# Patient Record
Sex: MALE | Race: WHITE | NOT HISPANIC OR LATINO | Employment: OTHER | URBAN - METROPOLITAN AREA
[De-identification: names, ages, dates, MRNs, and addresses within clinical notes are randomized per-mention and may not be internally consistent; named-entity substitution may affect disease eponyms.]

---

## 2017-01-13 ENCOUNTER — THERAPY VISIT (OUTPATIENT)
Dept: PHYSICAL THERAPY | Facility: CLINIC | Age: 62
End: 2017-01-13
Payer: COMMERCIAL

## 2017-01-13 DIAGNOSIS — M25.561 CHRONIC PAIN OF RIGHT KNEE: Primary | ICD-10-CM

## 2017-01-13 DIAGNOSIS — G89.29 CHRONIC PAIN OF RIGHT KNEE: Primary | ICD-10-CM

## 2017-01-13 PROCEDURE — 97530 THERAPEUTIC ACTIVITIES: CPT | Mod: GP | Performed by: PHYSICAL THERAPIST

## 2017-01-13 PROCEDURE — 97110 THERAPEUTIC EXERCISES: CPT | Mod: GP | Performed by: PHYSICAL THERAPIST

## 2017-01-13 PROCEDURE — 97112 NEUROMUSCULAR REEDUCATION: CPT | Mod: GP | Performed by: PHYSICAL THERAPIST

## 2017-01-18 DIAGNOSIS — G89.29 CHRONIC MIDLINE LOW BACK PAIN WITHOUT SCIATICA: Primary | ICD-10-CM

## 2017-01-18 DIAGNOSIS — M54.50 CHRONIC MIDLINE LOW BACK PAIN WITHOUT SCIATICA: Primary | ICD-10-CM

## 2017-01-23 ENCOUNTER — THERAPY VISIT (OUTPATIENT)
Dept: PHYSICAL THERAPY | Facility: CLINIC | Age: 62
End: 2017-01-23
Payer: COMMERCIAL

## 2017-01-23 DIAGNOSIS — M54.50 CHRONIC BILATERAL LOW BACK PAIN WITHOUT SCIATICA: Primary | ICD-10-CM

## 2017-01-23 DIAGNOSIS — G89.29 CHRONIC BILATERAL LOW BACK PAIN WITHOUT SCIATICA: Primary | ICD-10-CM

## 2017-01-23 PROCEDURE — 97161 PT EVAL LOW COMPLEX 20 MIN: CPT | Mod: GP | Performed by: PHYSICAL THERAPIST

## 2017-01-23 PROCEDURE — 97110 THERAPEUTIC EXERCISES: CPT | Mod: GP | Performed by: PHYSICAL THERAPIST

## 2017-01-23 NOTE — PROGRESS NOTES
Subjective:    Marvin Elaine is a 61 year old male with a lumbar condition.  Condition occurred with:  Insidious onset.    This is a chronic condition  Pt presents to PT with LBP that started in his 20s with no specific RO. Pt recalls having back pain after shoveling snow, but does not recall a specific injury. He has had chronic, recurrent LBP since, with intermittent flare ups several times per year. Pt states he was diagnosed with mild scoliosis in the late '70s, but has never had any treatment for the LBP.  Most recently, the LBP has been present for the past couple weeks without specific onset. He notes pain in the morning before getting out of bed. He has even felt stuck a few times with more intense, brief pain.     Recently, pt also experienced a strained abdominal muscle that he reports is feeling much better.     PMH: see previous EPIC notes. .    Site of Pain: Central and bilateral low back.  Radiates to:  No radiation.  Pain is described as aching (sore; very rarely has sharp or shooting pain, described as a chronic annoyance) and is intermittent Pain Scale: 1/10 pain sitting at time of eval. 0-4/10 pain range.   Associated symptoms:  Loss of motion/stiffness. Worse during: not dependent upon the time of day, other than being stiff upon waking.  Symptoms are exacerbated by lifting and bending (lying supine, prolonged sitting) and relieved by heat (moving out of a painful position).  Pain course: Very gradually getting worse throughout the years.   Special tests:  X-ray (1970's mild scoliosis).  Previous treatment: no previous treatment.    General health as reported by patient is excellent.                                            Objective:    Standing Alignment:        Lumbar:  Anterior pelvic tilt and lordosis incr                           Lumbar/SI Evaluation  ROM:    AROM Lumbar:   Flexion:          Hands to ankles, B LE and LB stretch  Ext:                    50%   Side Bend:        Left:   75%, produced R sided pain    Right:  75%, produced L sided pain  Rotation:           Left:     Right:   Side Glide:        Left:     Right:         Strength: Demonstrates poor core stability with transitional motions                Lumbar Provocation:      Left negative with:  PROM hip    Right negative with:  PROM hip  Spinal Segmental Conclusions: Generally hypomobile lumbar spine. Hypertrophied paraspinals. Lower crossed syndrome.                                                        General     ROS    Assessment/Plan:      Patient is a 61 year old male with lumbar complaints.    Patient has the following significant findings with corresponding treatment plan.                Diagnosis 1:  Chronic, recurrent LBP  Pain -  hot/cold therapy, manual therapy, self management, education, directional preference exercise and home program  Decreased ROM/flexibility - manual therapy, therapeutic exercise and home program  Decreased joint mobility - manual therapy, therapeutic exercise and home program  Decreased strength - therapeutic exercise, therapeutic activities and home program  Decreased function - therapeutic activities and home program  Impaired posture - neuro re-education and home program    Therapy Evaluation Codes:   1) History comprised of:   Personal factors that impact the plan of care:      Anxiety, Past/current experiences and Time since onset of symptoms.    Comorbidity factors that impact the plan of care are:      Depression.     Medications impacting care: Anti-depressant.  2) Examination of Body Systems comprised of:   Body structures and functions that impact the plan of care:      Lumbar spine, Pelvis and Thoracic Spine.   Activity limitations that impact the plan of care are:      Bending, Lifting, Reading/Computer work, Sleeping and Laying down.  3) Clinical presentation characteristics are:   Stable/Uncomplicated.  4) Decision-Making    Low complexity using standardized patient assessment  instrument and/or measureable assessment of functional outcome.  Cumulative Therapy Evaluation is: Low complexity.    Previous and current functional limitations:  (See Goal Flow Sheet for this information)    Short term and Long term goals: (See Goal Flow Sheet for this information)     Communication ability:  Patient appears to be able to clearly communicate and understand verbal and written communication and follow directions correctly.  Treatment Explanation - The following has been discussed with the patient:   RX ordered/plan of care  Anticipated outcomes  Possible risks and side effects  This patient would benefit from PT intervention to resume normal activities.   Rehab potential is good to excellent.    Frequency:  1 X week, once daily  Duration:  for 3 weeks tapering to 2 X a month over 8 weeks  Discharge Plan:  Achieve all LTG.  Independent in home treatment program.  Reach maximal therapeutic benefit.    Please refer to the daily flowsheet for treatment today, total treatment time and time spent performing 1:1 timed codes.

## 2017-01-27 ENCOUNTER — TELEPHONE (OUTPATIENT)
Dept: PHYSICAL THERAPY | Facility: CLINIC | Age: 62
End: 2017-01-27

## 2017-01-27 DIAGNOSIS — G89.29 CHRONIC BILATERAL LOW BACK PAIN WITHOUT SCIATICA: Primary | ICD-10-CM

## 2017-01-27 DIAGNOSIS — M54.50 CHRONIC BILATERAL LOW BACK PAIN WITHOUT SCIATICA: Primary | ICD-10-CM

## 2017-01-30 ENCOUNTER — MYC MEDICAL ADVICE (OUTPATIENT)
Dept: FAMILY MEDICINE | Facility: CLINIC | Age: 62
End: 2017-01-30

## 2017-01-30 DIAGNOSIS — M54.50 CHRONIC MIDLINE LOW BACK PAIN WITHOUT SCIATICA: Primary | ICD-10-CM

## 2017-01-30 DIAGNOSIS — G89.29 CHRONIC MIDLINE LOW BACK PAIN WITHOUT SCIATICA: Primary | ICD-10-CM

## 2017-01-30 NOTE — TELEPHONE ENCOUNTER
Referral pended.    Routing to PCP.    Dr. Wegener-Please review and sign if agree.    Thank you!  AUGUSTUS SnowN, RN

## 2017-02-02 ENCOUNTER — THERAPY VISIT (OUTPATIENT)
Dept: PHYSICAL THERAPY | Facility: CLINIC | Age: 62
End: 2017-02-02
Payer: COMMERCIAL

## 2017-02-02 DIAGNOSIS — G89.29 CHRONIC BILATERAL LOW BACK PAIN WITHOUT SCIATICA: Primary | ICD-10-CM

## 2017-02-02 DIAGNOSIS — M54.50 CHRONIC BILATERAL LOW BACK PAIN WITHOUT SCIATICA: Primary | ICD-10-CM

## 2017-02-02 PROCEDURE — 97112 NEUROMUSCULAR REEDUCATION: CPT | Mod: GP | Performed by: PHYSICAL THERAPIST

## 2017-02-02 PROCEDURE — 97530 THERAPEUTIC ACTIVITIES: CPT | Mod: GP | Performed by: PHYSICAL THERAPIST

## 2017-02-02 PROCEDURE — 97110 THERAPEUTIC EXERCISES: CPT | Mod: GP | Performed by: PHYSICAL THERAPIST

## 2017-02-02 ASSESSMENT — ACTIVITIES OF DAILY LIVING (ADL)
RISE FROM A CHAIR: ACTIVITY IS NOT DIFFICULT
STAND: ACTIVITY IS NOT DIFFICULT
KNEEL ON THE FRONT OF YOUR KNEE: ACTIVITY IS NOT DIFFICULT
LIMPING: I DO NOT HAVE THE SYMPTOM
GIVING WAY, BUCKLING OR SHIFTING OF KNEE: I DO NOT HAVE THE SYMPTOM
HOW_WOULD_YOU_RATE_THE_CURRENT_FUNCTION_OF_YOUR_KNEE_DURING_YOUR_USUAL_DAILY_ACTIVITIES_ON_A_SCALE_FROM_0_TO_100_WITH_100_BEING_YOUR_LEVEL_OF_KNEE_FUNCTION_PRIOR_TO_YOUR_INJURY_AND_0_BEING_THE_INABILITY_TO_PERFORM_ANY_OF_YOUR_USUAL_DAILY_ACTIVITIES?: 85
RAW_SCORE: 67
SWELLING: I DO NOT HAVE THE SYMPTOM
KNEE_ACTIVITY_OF_DAILY_LIVING_SUM: 67
WALK: ACTIVITY IS NOT DIFFICULT
SIT WITH YOUR KNEE BENT: ACTIVITY IS NOT DIFFICULT
PAIN: I HAVE THE SYMPTOM BUT IT DOES NOT AFFECT MY ACTIVITY
STIFFNESS: I DO NOT HAVE THE SYMPTOM
SQUAT: ACTIVITY IS MINIMALLY DIFFICULT
WEAKNESS: I HAVE THE SYMPTOM BUT IT DOES NOT AFFECT MY ACTIVITY
GO DOWN STAIRS: ACTIVITY IS NOT DIFFICULT
KNEE_ACTIVITY_OF_DAILY_LIVING_SCORE: 95.71
HOW_WOULD_YOU_RATE_THE_OVERALL_FUNCTION_OF_YOUR_KNEE_DURING_YOUR_USUAL_DAILY_ACTIVITIES?: NEARLY NORMAL
GO UP STAIRS: ACTIVITY IS NOT DIFFICULT
AS_A_RESULT_OF_YOUR_KNEE_INJURY,_HOW_WOULD_YOU_RATE_YOUR_CURRENT_LEVEL_OF_DAILY_ACTIVITY?: NEARLY NORMAL

## 2017-03-01 ENCOUNTER — OFFICE VISIT (OUTPATIENT)
Dept: DERMATOLOGY | Facility: CLINIC | Age: 62
End: 2017-03-01

## 2017-03-01 DIAGNOSIS — L82.0 INFLAMED SEBORRHEIC KERATOSIS: Primary | ICD-10-CM

## 2017-03-01 RX ORDER — VILAZODONE HYDROCHLORIDE 40 MG/1
60 TABLET ORAL
COMMUNITY
Start: 2017-01-02 | End: 2021-10-21

## 2017-03-01 NOTE — PROGRESS NOTES
SUBJECTIVE:  Nick comes in for a general skin check.  He is quite concerned about the health of his skin, but has always been averse to getting sun exposure.  A brother, who is older by a year, has had a lot of sun damage and lives in [place] and has had many procedures apparently.       OBJECTIVE:  A healthy gentleman in no distress, a very pleasant individual.  We checked his face, neck, chest, back, arms, legs, hands, feet, buttocks, everything but his genitals.  I found only some scattered seborrheic keratoses and nevi, but no actinic keratoses or other evidence of skin cancer.      ASSESSMENT:  No worrisome lesions.      PLAN:  Reassured repeatedly that he is doing well, mainly because he avoids the sun and is health conscious.        I suggested that he return in 1 year, probably to allay his anxieties more than anything, though overall a nice  gentleman without any serious skin disease or skin lesions.      MEDICATIONS:  Reviewed.      ALLERGIES:  Reviewed.

## 2017-03-01 NOTE — NURSING NOTE
"Dermatology Rooming Note    Marvin Elaine's goals for this visit include:   Chief Complaint   Patient presents with     Skin Check     Skin check, Marvin states \" I have an AK on my foot.\"     Rasheeda Koch LPN  "

## 2017-03-01 NOTE — LETTER
3/1/2017       RE: Marvin Elaine  [address]  [facility] [address]     Dear Colleague,    Thank you for referring your patient, Marvin Elaine, to the [facility] DERMAT[facility]Y at [facility]. Please see a copy of my visit note below.    SUBJECTIVE:  Nick comes in for a general skin check.  He is quite concerned about the health of his skin, but has always been averse to getting sun exposure.  A brother, who is older by a year, has had a lot of sun damage and lives in [place] and has had many procedures apparently.       OBJECTIVE:  A healthy gentleman in no distress, a very pleasant individual.  We checked his face, neck, chest, back, arms, legs, hands, feet, buttocks, everything but his genitals.  I found only some scattered seborrheic keratoses and nevi, but no actinic keratoses or other evidence of skin cancer.      ASSESSMENT:  No worrisome lesions.      PLAN:  Reassured repeatedly that he is doing well, mainly because he avoids the sun and is health conscious.        I suggested that he return in 1 year, probably to allay his anxieties more than anything, though overall a nice  gentleman without any serious skin disease or skin lesions.      MEDICATIONS:  Reviewed.      ALLERGIES:  Reviewed.       Again, thank you for allowing me to participate in the care of your patient.      Sincerely,    ABHAY Soto MD

## 2017-03-21 ENCOUNTER — TELEPHONE (OUTPATIENT)
Dept: FAMILY MEDICINE | Facility: CLINIC | Age: 62
End: 2017-03-21

## 2017-03-21 DIAGNOSIS — Z12.11 COLON CANCER SCREENING: ICD-10-CM

## 2017-03-21 DIAGNOSIS — Z11.59 NEED FOR HEPATITIS C SCREENING TEST: Primary | ICD-10-CM

## 2017-03-21 NOTE — TELEPHONE ENCOUNTER
Left message for patient to inform that lab orders are in chart and he can call and schedule a lab only appointment.  Cathie Webb RN

## 2017-03-21 NOTE — TELEPHONE ENCOUNTER
Reason for Call:  Other call back/ appointment    Detailed comments: Pt would like to make a lab only appointment to  fit test and hep c screening. Labs are not ordered for hep c screening. Please let pt know once these are ordered so he can set up an appointment, thank you!    Phone Number Patient can be reached at: Home number on file [phone] (home)    Best Time: anytime    Can we leave a detailed message on this number? YES    Call taken on 3/21/2017 at 9:18 AM by Jodi Solis

## 2017-03-21 NOTE — TELEPHONE ENCOUNTER
Labs pended.    Routing to PCP.    Dr. Wegener-Please review and sign if agree.    Thank you!  AUGUSTUS SnowN, RN

## 2017-04-05 DIAGNOSIS — Z12.11 COLON CANCER SCREENING: ICD-10-CM

## 2017-04-05 DIAGNOSIS — Z11.59 NEED FOR HEPATITIS C SCREENING TEST: ICD-10-CM

## 2017-04-05 PROCEDURE — 86803 HEPATITIS C AB TEST: CPT | Performed by: FAMILY MEDICINE

## 2017-04-05 PROCEDURE — 36415 COLL VENOUS BLD VENIPUNCTURE: CPT | Performed by: FAMILY MEDICINE

## 2017-04-06 LAB — HCV AB SERPL QL IA: NORMAL

## 2017-04-14 PROCEDURE — 82274 ASSAY TEST FOR BLOOD FECAL: CPT | Performed by: FAMILY MEDICINE

## 2017-04-17 DIAGNOSIS — R19.5 POSITIVE FIT (FECAL IMMUNOCHEMICAL TEST): Primary | ICD-10-CM

## 2017-04-17 DIAGNOSIS — Z12.11 COLON CANCER SCREENING: ICD-10-CM

## 2017-04-17 LAB — HEMOCCULT STL QL IA: POSITIVE

## 2017-04-18 ENCOUNTER — TELEPHONE (OUTPATIENT)
Dept: FAMILY MEDICINE | Facility: CLINIC | Age: 62
End: 2017-04-18

## 2017-04-18 DIAGNOSIS — R19.5 POSITIVE FIT (FECAL IMMUNOCHEMICAL TEST): Primary | ICD-10-CM

## 2017-04-18 NOTE — TELEPHONE ENCOUNTER
Dr Wegener,    JONNA only    Pt needs to call [phone] to schedule colonoscopy. He was contacted and given this information.    Pt confesses he has anxiety and depression and the procedure and the possible results are very difficult for him  He is seeing his therapist in just a couple hours so will discuss with him and find a way to cope with this. He does realize it is important to do and he took down the scheduling number.    He does take alprazolam as needed for anxiety    I have asked pt to please call us back if any questions or if there is anyway we can help him .    Lili Desouza, RN, BSN

## 2017-04-18 NOTE — TELEPHONE ENCOUNTER
FIT positive.  Should avoid future FIT test.  Should have colonoscopy to rule out colon cancer.   Signed colonoscopy referral.

## 2017-04-20 ENCOUNTER — MYC MEDICAL ADVICE (OUTPATIENT)
Dept: FAMILY MEDICINE | Facility: CLINIC | Age: 62
End: 2017-04-20

## 2017-04-20 ENCOUNTER — TELEPHONE (OUTPATIENT)
Dept: FAMILY MEDICINE | Facility: CLINIC | Age: 62
End: 2017-04-20

## 2017-04-20 NOTE — TELEPHONE ENCOUNTER
Call pt regarding below.  I don't know why the colonoscopy referral no longer has a scheduling number.     Joel Wegener,MD    The stool screening test was positive so I would definitely recommend doing a colonoscopy.  I will have my team contact you as well with scheduling information.   Joel Wegener,MD

## 2017-04-20 NOTE — TELEPHONE ENCOUNTER
See 4/18/17 telephone encounter and 4/19/17 MyChart encounter.    Patient was given test results, colonoscopy appointment and prep information.    Closing this encounter.    AUGUSTUS SnowN, RN

## 2017-05-02 DIAGNOSIS — Z12.11 ENCOUNTER FOR SCREENING COLONOSCOPY: Primary | ICD-10-CM

## 2017-05-02 RX ORDER — PEG-3350, SODIUM SULFATE, SODIUM CHLORIDE, POTASSIUM CHLORIDE, SODIUM ASCORBATE AND ASCORBIC ACID 7.5-2.691G
1 KIT ORAL SEE ADMIN INSTRUCTIONS
Qty: 1 EACH | Refills: 0 | Status: SHIPPED | OUTPATIENT
Start: 2017-05-02 | End: 2018-12-20

## 2017-05-02 RX ORDER — SIMETHICONE 125 MG
125 CAPSULE ORAL SEE ADMIN INSTRUCTIONS
Qty: 1 CAPSULE | Refills: 0 | Status: SHIPPED | OUTPATIENT
Start: 2017-05-02 | End: 2018-12-20

## 2017-05-08 DIAGNOSIS — R11.0 NAUSEA: Primary | ICD-10-CM

## 2017-05-08 RX ORDER — ONDANSETRON 4 MG/1
4-8 TABLET, ORALLY DISINTEGRATING ORAL EVERY 8 HOURS PRN
Qty: 10 TABLET | Refills: 0 | Status: SHIPPED | OUTPATIENT
Start: 2017-05-08 | End: 2021-10-21

## 2017-05-08 NOTE — TELEPHONE ENCOUNTER
Writer called patient who stated:  1. Not currently nauseous  2. Had difficult time completing prep for last colonoscopy 10-11 years ago  3. Picked up prep from pharmacy   A. Medication insert does mention bloating/nausea as side effect  4. Would like anti-nausea medication prescribed so he can be prepared incase he needs it    Med pended.    Dr. Wegener-Please review and sign if agree.      Thank you!  AUGUSTUS SnowN, RN

## 2017-05-08 NOTE — TELEPHONE ENCOUNTER
Reason for Call:  Medication or medication refill: Anti-Nausea Medication    Do you use a [facility]?  Name of the pharmacy and phone number for the current request:  [facility]    Name of the medication requested: Anti - Nausea medication    Other request: Pt is having his colonoscopy procedure Wednesday morning. Pt is requesting Anti-Nausea medication ASAP before his procedure.     Can we leave a detailed message on this number? YES    Phone number patient can be reached at: Home number on file [phone] (home)    Best Time: anytime    Call taken on 5/8/2017 at 2:30 PM by Kellie Solis

## 2017-05-10 ENCOUNTER — TRANSFERRED RECORDS (OUTPATIENT)
Dept: HEALTH INFORMATION MANAGEMENT | Facility: CLINIC | Age: 62
End: 2017-05-10

## 2017-09-08 ENCOUNTER — OFFICE VISIT (OUTPATIENT)
Dept: URGENT CARE | Facility: URGENT CARE | Age: 62
End: 2017-09-08
Payer: COMMERCIAL

## 2017-09-08 VITALS
OXYGEN SATURATION: 100 % | HEART RATE: 81 BPM | SYSTOLIC BLOOD PRESSURE: 130 MMHG | DIASTOLIC BLOOD PRESSURE: 76 MMHG | HEIGHT: 68 IN | WEIGHT: 128 LBS | BODY MASS INDEX: 19.4 KG/M2 | TEMPERATURE: 98.1 F

## 2017-09-08 DIAGNOSIS — S61.452A CAT BITE OF LEFT HAND, INITIAL ENCOUNTER: Primary | ICD-10-CM
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
## 2017-09-08 PROCEDURE — 99213 OFFICE O/P EST LOW 20 MIN: CPT | Performed by: FAMILY MEDICINE

## 2017-09-08 NOTE — PROGRESS NOTES
Subjective: Patient was taking care of a cat for a friend, is confident that the shots on the cat are up-to-date but the cat bit him in the left hand and that was this morning and it has swollen up since then. It's at the base of his index finger and its getting stiffer.    Objective: The knuckle is mildly swollen and mildly red. There is no active bleeding. It's not warm.    Assessment and plan: Infected cat bite. He will start Augmentin tonight and warned that if things don't turn around quickly he should go to the emergency room. It doesn't look really bad right now and so I don't think injectable antibiotics are needed at this time.

## 2017-09-08 NOTE — MR AVS SNAPSHOT
"              After Visit Summary   9/8/2017    Marvin Elaine    MRN: [id number]           Patient Information     Date Of Birth          1955        Visit Information        Provider Department      9/8/2017 6:00 PM Neftali Palacios MD [facility]        Today's Diagnoses     Cat bite of left hand, initial encounter    -  1       Follow-ups after your visit        Who to contact     If you have questions or need follow up information about today's clinic visit or your schedule please contact [facility] directly at [phone].  Normal or non-critical lab and imaging results will be communicated to you by [email or web address], letter or phone within 4 business days after the clinic has received the results. If you do not hear from us within 7 days, please contact the clinic through Control4t or phone. If you have a critical or abnormal lab result, we will notify you by phone as soon as possible.  Submit refill requests through Bloomfire or call your pharmacy and they will forward the refill request to us. Please allow 3 business days for your refill to be completed.          Additional Information About Your Visit        MyChart Information     Bloomfire gives you secure access to your electronic health record. If you see a primary care provider, you can also send messages to your care team and make appointments. If you have questions, please call your primary care clinic.  If you do not have a primary care provider, please call [phone] and they will assist you.        Care EveryWhere ID     This is your Care EveryWhere ID. This could be used by other organizations to access your [place] medical records  [id number]        Your Vitals Were     Pulse Temperature Height Pulse Oximetry BMI (Body Mass Index)       81 98.1  F (36.7  C) (Oral) 5' 8\" (1.727 m) 100% 19.46 kg/m2        Blood Pressure from Last 3 Encounters:   09/08/17 130/76   11/22/16 104/66   07/08/16 125/78 "    Weight from Last 3 Encounters:   09/08/17 128 lb (58.1 kg)   11/22/16 122 lb (55.3 kg)   07/08/16 122 lb (55.3 kg)              Today, you had the following     No orders found for display         Today's Medication Changes          These changes are accurate as of: 9/8/17  6:48 PM.  If you have any questions, ask your nurse or doctor.               Start taking these medicines.        Dose/Directions    amoxicillin-clavulanate 875-125 MG per tablet   Commonly known as:  AUGMENTIN   Used for:  Cat bite of left hand, initial encounter   Started by:  Neftali Palacios MD        Dose:  1 tablet   Take 1 tablet by mouth 2 times daily   Quantity:  20 tablet   Refills:  0            Where to get your medicines      These medications were sent to [id number] - [place], [place] - [address] AT [facility] [address]  [address], [place] [address]     Phone:  [phone]     amoxicillin-clavulanate 875-125 MG per tablet                Primary Care Provider Office Phone # Fax #    Joel Daniel Irwin Wegener, [phone]       [address]  [facility] [address]        Equal Access to Services     SON SOTO AH: Hadii melba samuels hadasho Soomaali, waaxda luqadaha, qaybta kaalmada adeegyada, wilma vivas. So [facility] [phone].    ATENCIÓN: Si habla español, tiene a pace disposición servicios gratuitos de asistencia lingüística. [facility] [phone].    We comply with applicable federal civil rights laws and [place] laws. We do not discriminate on the basis of race, color, national origin, age, disability sex, sexual orientation or gender identity.            Thank you!     Thank you for choosing [facility]  for your care. Our goal is always to provide you with excellent care. Hearing back from our patients is one way we can continue to improve our services. Please take a few minutes to complete the written survey that you may receive  "in the mail after your visit with us. Thank you!             Your Updated Medication List - Protect others around you: Learn how to safely use, store and throw away your medicines at www.disposemymeds.org.          This list is accurate as of: 9/8/17  6:48 PM.  Always use your most recent med list.                   Brand Name Dispense Instructions for use Diagnosis    ALPRAZOLAM PO      Take 0.25 mg by mouth        amoxicillin-clavulanate 875-125 MG per tablet    AUGMENTIN    20 tablet    Take 1 tablet by mouth 2 times daily    Cat bite of left hand, initial encounter       ASPIRIN PO      Take 81 mg by mouth daily        latanoprost 0.005 % ophthalmic solution    XALATAN     Place 1 drop Into the left eye daily        magnesium citrate solution     296 mL    Take 296 mLs by mouth See Admin Instructions Refer to the \"Getting Ready for a Colonoscopy\" patient handout    Encounter for screening colonoscopy       Multi-vitamin Tabs tablet      Take 1 tablet by mouth daily        ondansetron 4 MG ODT tab    ZOFRAN ODT    10 tablet    Take 1-2 tablets (4-8 mg) by mouth every 8 hours as needed for nausea    Nausea       PEG-KCl-NaCl-NaSulf-Na Asc-C 100 G Solr    MOVIPREP    1 each    Take 1 each by mouth See Admin Instructions Refer to \"Getting Ready for a Colonoscopy\" patient handout    Encounter for screening colonoscopy       Simethicone 125 MG Caps     1 capsule    Take 125 mg by mouth See Admin Instructions Refer to \"Getting Ready for a Colonoscopy\" patient handout    Encounter for screening colonoscopy       VIIBRYD 40 MG Tabs tablet   Generic drug:  vilazodone      40 mg        VITAMIN D (CHOLECALCIFEROL) PO      Take 1,000 Units by mouth daily Vitamin D        WELLBUTRIN 100 MG tablet   Generic drug:  buPROPion     90 tablet    Take 100 mg by mouth 2 times daily          "

## 2017-10-02 ENCOUNTER — ALLIED HEALTH/NURSE VISIT (OUTPATIENT)
Dept: NURSING | Facility: CLINIC | Age: 62
End: 2017-10-02
Payer: COMMERCIAL

## 2017-10-02 DIAGNOSIS — Z23 NEED FOR PROPHYLACTIC VACCINATION AND INOCULATION AGAINST INFLUENZA: Primary | ICD-10-CM

## 2017-10-02 PROCEDURE — 90471 IMMUNIZATION ADMIN: CPT

## 2017-10-02 PROCEDURE — 99207 ZZC NO CHARGE NURSE ONLY: CPT

## 2017-10-02 PROCEDURE — 90686 IIV4 VACC NO PRSV 0.5 ML IM: CPT

## 2017-10-02 NOTE — MR AVS SNAPSHOT
After Visit Summary   10/2/2017    Marvin Elaine    MRN: [id number]           Patient Information     Date Of Birth          1955        Visit Information        Provider Department      10/2/2017 9:15 AM  FLU CLINIC NURSE [facility]        Today's Diagnoses     Need for prophylactic vaccination and inoculation against influenza    -  1       Follow-ups after your visit        Who to contact     If you have questions or need follow up information about today's clinic visit or your schedule please contact [facility] directly at [phone].  Normal or non-critical lab and imaging results will be communicated to you by StarForce Technologieshart, letter or phone within 4 business days after the clinic has received the results. If you do not hear from us within 7 days, please contact the clinic through YelloYello or phone. If you have a critical or abnormal lab result, we will notify you by phone as soon as possible.  Submit refill requests through YelloYello or call your pharmacy and they will forward the refill request to us. Please allow 3 business days for your refill to be completed.          Additional Information About Your Visit        MyChart Information     YelloYello gives you secure access to your electronic health record. If you see a primary care provider, you can also send messages to your care team and make appointments. If you have questions, please call your primary care clinic.  If you do not have a primary care provider, please call [phone] and they will assist you.        Care EveryWhere ID     This is your Care EveryWhere ID. This could be used by other organizations to access your [place] medical records  [id number]         Blood Pressure from Last 3 Encounters:   09/08/17 130/76   11/22/16 104/66   07/08/16 125/78    Weight from Last 3 Encounters:   09/08/17 128 lb (58.1 kg)   11/22/16 122 lb (55.3 kg)   07/08/16 122 lb (55.3 kg)              We Performed the  "Following     FLU VAC, SPLIT VIRUS IM > 3 YO (QUADRIVALENT) [88900]     Vaccine Administration, Initial [64443]        Primary Care Provider Office Phone # Fax #    Joel Daniel Irwin Wegener, [phone] [address]  [facility] [address]        Equal Access to Services     MOLLYAICHA BRITTANY : Hadii aad ku hadasho Soomaali, waaxda luqadaha, qaybta kaalmada adeegyada, waxay idiin hayaan adeeg kharash la'aan . So [facility] [phone].    ATENCIÓN: Si habla español, tiene a pace disposición servicios gratuitos de asistencia lingüística. Llame al [phone].    We comply with applicable federal civil rights laws and [place] laws. We do not discriminate on the basis of race, color, national origin, age, disability, sex, sexual orientation, or gender identity.            Thank you!     Thank you for choosing [facility]  for your care. Our goal is always to provide you with excellent care. Hearing back from our patients is one way we can continue to improve our services. Please take a few minutes to complete the written survey that you may receive in the mail after your visit with us. Thank you!             Your Updated Medication List - Protect others around you: Learn how to safely use, store and throw away your medicines at www.disposemymeds.org.          This list is accurate as of: 10/2/17  9:26 AM.  Always use your most recent med list.                   Brand Name Dispense Instructions for use Diagnosis    ALPRAZOLAM PO      Take 0.25 mg by mouth        amoxicillin-clavulanate 875-125 MG per tablet    AUGMENTIN    20 tablet    Take 1 tablet by mouth 2 times daily    Cat bite of left hand, initial encounter       ASPIRIN PO      Take 81 mg by mouth daily        latanoprost 0.005 % ophthalmic solution    XALATAN     Place 1 drop Into the left eye daily        magnesium citrate solution     296 mL    Take 296 mLs by mouth See Admin Instructions Refer to the \"Getting Ready for a " "Colonoscopy\" patient handout    Encounter for screening colonoscopy       Multi-vitamin Tabs tablet      Take 1 tablet by mouth daily        ondansetron 4 MG ODT tab    ZOFRAN ODT    10 tablet    Take 1-2 tablets (4-8 mg) by mouth every 8 hours as needed for nausea    Nausea       PEG-KCl-NaCl-NaSulf-Na Asc-C 100 G Solr    MOVIPREP    1 each    Take 1 each by mouth See Admin Instructions Refer to \"Getting Ready for a Colonoscopy\" patient handout    Encounter for screening colonoscopy       Simethicone 125 MG Caps     1 capsule    Take 125 mg by mouth See Admin Instructions Refer to \"Getting Ready for a Colonoscopy\" patient handout    Encounter for screening colonoscopy       VIIBRYD 40 MG Tabs tablet   Generic drug:  vilazodone      40 mg        VITAMIN D (CHOLECALCIFEROL) PO      Take 1,000 Units by mouth daily Vitamin D        WELLBUTRIN 100 MG tablet   Generic drug:  buPROPion     90 tablet    Take 100 mg by mouth 2 times daily          "

## 2017-10-02 NOTE — PROGRESS NOTES
Injectable Influenza Immunization Documentation    1.  Is the person to be vaccinated sick today?   No    2. Does the person to be vaccinated have an allergy to a component   of the vaccine?   No    3. Has the person to be vaccinated ever had a serious reaction   to influenza vaccine in the past?   No    4. Has the person to be vaccinated ever had Guillain-Barré syndrome?   No    Form completed by [facility]

## 2017-10-13 ENCOUNTER — APPOINTMENT (OUTPATIENT)
Dept: CT IMAGING | Facility: CLINIC | Age: 62
End: 2017-10-13
Attending: EMERGENCY MEDICINE
Payer: COMMERCIAL

## 2017-10-13 ENCOUNTER — OFFICE VISIT (OUTPATIENT)
Dept: FAMILY MEDICINE | Facility: CLINIC | Age: 62
End: 2017-10-13
Payer: COMMERCIAL

## 2017-10-13 ENCOUNTER — HOSPITAL ENCOUNTER (EMERGENCY)
Facility: CLINIC | Age: 62
Discharge: HOME | End: 2017-10-13
Attending: EMERGENCY MEDICINE | Admitting: EMERGENCY MEDICINE
Payer: COMMERCIAL

## 2017-10-13 VITALS
SYSTOLIC BLOOD PRESSURE: 155 MMHG | TEMPERATURE: 97.3 F | HEART RATE: 71 BPM | OXYGEN SATURATION: 97 % | DIASTOLIC BLOOD PRESSURE: 87 MMHG

## 2017-10-13 VITALS
OXYGEN SATURATION: 100 % | DIASTOLIC BLOOD PRESSURE: 87 MMHG | TEMPERATURE: 98.1 F | SYSTOLIC BLOOD PRESSURE: 135 MMHG | RESPIRATION RATE: 16 BRPM

## 2017-10-13 DIAGNOSIS — N13.2 HYDRONEPHROSIS WITH RENAL AND URETERAL CALCULUS OBSTRUCTION: ICD-10-CM

## 2017-10-13 DIAGNOSIS — E86.0 DEHYDRATION: ICD-10-CM

## 2017-10-13 DIAGNOSIS — D64.9 ABSOLUTE ANEMIA: ICD-10-CM

## 2017-10-13 DIAGNOSIS — M54.50 ACUTE RIGHT-SIDED LOW BACK PAIN WITHOUT SCIATICA: Primary | ICD-10-CM

## 2017-10-13 DIAGNOSIS — N20.0 CALCULUS OF KIDNEY: ICD-10-CM

## 2017-10-13 DIAGNOSIS — R10.31 ABDOMINAL PAIN, RIGHT LOWER QUADRANT: ICD-10-CM

## 2017-10-13 LAB
ALBUMIN SERPL-MCNC: 3.5 G/DL (ref 3.4–5)
ALBUMIN UR-MCNC: 10 MG/DL
ALP SERPL-CCNC: 96 U/L (ref 40–150)
ALT SERPL W P-5'-P-CCNC: 19 U/L (ref 0–70)
ANION GAP SERPL CALCULATED.3IONS-SCNC: 7 MMOL/L (ref 3–14)
APPEARANCE UR: ABNORMAL
AST SERPL W P-5'-P-CCNC: 20 U/L (ref 0–45)
BASOPHILS # BLD AUTO: 0 10E9/L (ref 0–0.2)
BASOPHILS NFR BLD AUTO: 0.5 %
BILIRUB SERPL-MCNC: 0.4 MG/DL (ref 0.2–1.3)
BILIRUB UR QL STRIP: NEGATIVE
BUN SERPL-MCNC: 20 MG/DL (ref 7–30)
CALCIUM SERPL-MCNC: 8.5 MG/DL (ref 8.5–10.1)
CHLORIDE SERPL-SCNC: 104 MMOL/L (ref 94–109)
CO2 SERPL-SCNC: 30 MMOL/L (ref 20–32)
COLOR UR AUTO: YELLOW
CREAT SERPL-MCNC: 1.35 MG/DL (ref 0.66–1.25)
DIFFERENTIAL METHOD BLD: ABNORMAL
EOSINOPHIL # BLD AUTO: 0.1 10E9/L (ref 0–0.7)
EOSINOPHIL NFR BLD AUTO: 1.7 %
ERYTHROCYTE [DISTWIDTH] IN BLOOD BY AUTOMATED COUNT: 14.9 % (ref 10–15)
GFR SERPL CREATININE-BSD FRML MDRD: 53 ML/MIN/1.7M2
GLUCOSE SERPL-MCNC: 103 MG/DL (ref 70–99)
GLUCOSE UR STRIP-MCNC: NEGATIVE MG/DL
HCT VFR BLD AUTO: 39.7 % (ref 40–53)
HGB BLD-MCNC: 12.2 G/DL (ref 13.3–17.7)
HGB UR QL STRIP: ABNORMAL
HYALINE CASTS #/AREA URNS LPF: 3 /LPF (ref 0–2)
IMM GRANULOCYTES # BLD: 0 10E9/L (ref 0–0.4)
IMM GRANULOCYTES NFR BLD: 0.1 %
KETONES UR STRIP-MCNC: NEGATIVE MG/DL
LEUKOCYTE ESTERASE UR QL STRIP: NEGATIVE
LIPASE SERPL-CCNC: 127 U/L (ref 73–393)
LYMPHOCYTES # BLD AUTO: 1 10E9/L (ref 0.8–5.3)
LYMPHOCYTES NFR BLD AUTO: 11.6 %
MCH RBC QN AUTO: 26.8 PG (ref 26.5–33)
MCHC RBC AUTO-ENTMCNC: 30.7 G/DL (ref 31.5–36.5)
MCV RBC AUTO: 87 FL (ref 78–100)
MONOCYTES # BLD AUTO: 0.4 10E9/L (ref 0–1.3)
MONOCYTES NFR BLD AUTO: 5 %
MUCOUS THREADS #/AREA URNS LPF: PRESENT /LPF
NEUTROPHILS # BLD AUTO: 6.8 10E9/L (ref 1.6–8.3)
NEUTROPHILS NFR BLD AUTO: 81.1 %
NITRATE UR QL: NEGATIVE
NRBC # BLD AUTO: 0 10*3/UL
NRBC BLD AUTO-RTO: 0 /100
PH UR STRIP: 7.5 PH (ref 5–7)
PLATELET # BLD AUTO: 227 10E9/L (ref 150–450)
POTASSIUM SERPL-SCNC: 4.1 MMOL/L (ref 3.4–5.3)
PROT SERPL-MCNC: 6.4 G/DL (ref 6.8–8.8)
RADIOLOGIST FLAGS: ABNORMAL
RBC # BLD AUTO: 4.56 10E12/L (ref 4.4–5.9)
RBC #/AREA URNS AUTO: 113 /HPF (ref 0–2)
SODIUM SERPL-SCNC: 140 MMOL/L (ref 133–144)
SOURCE: ABNORMAL
SP GR UR STRIP: 1.01 (ref 1–1.03)
TRANS CELLS #/AREA URNS HPF: <1 /HPF (ref 0–1)
UROBILINOGEN UR STRIP-MCNC: NORMAL MG/DL (ref 0–2)
WBC # BLD AUTO: 8.4 10E9/L (ref 4–11)
WBC #/AREA URNS AUTO: 0 /HPF (ref 0–2)

## 2017-10-13 PROCEDURE — 80053 COMPREHEN METABOLIC PANEL: CPT | Performed by: EMERGENCY MEDICINE

## 2017-10-13 PROCEDURE — 96374 THER/PROPH/DIAG INJ IV PUSH: CPT

## 2017-10-13 PROCEDURE — 99215 OFFICE O/P EST HI 40 MIN: CPT | Performed by: NURSE PRACTITIONER

## 2017-10-13 PROCEDURE — 99284 EMERGENCY DEPT VISIT MOD MDM: CPT | Mod: Z6 | Performed by: EMERGENCY MEDICINE

## 2017-10-13 PROCEDURE — 25000132 ZZH RX MED GY IP 250 OP 250 PS 637: Performed by: EMERGENCY MEDICINE

## 2017-10-13 PROCEDURE — 25000128 H RX IP 250 OP 636: Performed by: EMERGENCY MEDICINE

## 2017-10-13 PROCEDURE — 81001 URINALYSIS AUTO W/SCOPE: CPT | Performed by: EMERGENCY MEDICINE

## 2017-10-13 PROCEDURE — 85025 COMPLETE CBC W/AUTO DIFF WBC: CPT | Performed by: EMERGENCY MEDICINE

## 2017-10-13 PROCEDURE — 99284 EMERGENCY DEPT VISIT MOD MDM: CPT | Mod: 25

## 2017-10-13 PROCEDURE — 74176 CT ABD & PELVIS W/O CONTRAST: CPT

## 2017-10-13 PROCEDURE — 83690 ASSAY OF LIPASE: CPT | Performed by: EMERGENCY MEDICINE

## 2017-10-13 RX ORDER — TAMSULOSIN HYDROCHLORIDE 0.4 MG/1
0.4 CAPSULE ORAL DAILY
Status: DISCONTINUED | OUTPATIENT
Start: 2017-10-13 | End: 2017-10-13 | Stop reason: HOSPADM

## 2017-10-13 RX ORDER — TAMSULOSIN HYDROCHLORIDE 0.4 MG/1
0.4 CAPSULE ORAL DAILY
Qty: 5 CAPSULE | Refills: 0 | Status: SHIPPED | OUTPATIENT
Start: 2017-10-13 | End: 2017-10-23

## 2017-10-13 RX ORDER — IBUPROFEN 200 MG
400 TABLET ORAL EVERY 8 HOURS PRN
Qty: 60 TABLET | Refills: 0 | Status: SHIPPED | OUTPATIENT
Start: 2017-10-13 | End: 2022-12-30

## 2017-10-13 RX ORDER — OXYCODONE AND ACETAMINOPHEN 5; 325 MG/1; MG/1
1-2 TABLET ORAL EVERY 4 HOURS PRN
Qty: 15 TABLET | Refills: 0 | Status: SHIPPED | OUTPATIENT
Start: 2017-10-13 | End: 2018-12-20

## 2017-10-13 RX ORDER — KETOROLAC TROMETHAMINE 30 MG/ML
30 INJECTION, SOLUTION INTRAMUSCULAR; INTRAVENOUS ONCE
Status: COMPLETED | OUTPATIENT
Start: 2017-10-13 | End: 2017-10-13

## 2017-10-13 RX ADMIN — TAMSULOSIN HYDROCHLORIDE 0.4 MG: 0.4 CAPSULE ORAL at 14:28

## 2017-10-13 RX ADMIN — KETOROLAC TROMETHAMINE 30 MG: 30 INJECTION, SOLUTION INTRAMUSCULAR at 13:13

## 2017-10-13 ASSESSMENT — ENCOUNTER SYMPTOMS
FLANK PAIN: 1
HEMATURIA: 0
FEVER: 0
NAUSEA: 0
ABDOMINAL PAIN: 0
SHORTNESS OF BREATH: 0
VOMITING: 0

## 2017-10-13 NOTE — ED NOTES
Bed: ED06  Expected date: 10/13/17  Expected time: 12:21 PM  Means of arrival: Ambulance  Comments:  [id number]  62 male  abd pain  Yellow

## 2017-10-13 NOTE — ED NOTES
Pt arrives from clinic afte pt started having sudden onset, severe right sided flank pain radiating into his abdomin starting at around 0700 this morning. EMS gsve pt 10 mg of morphine with minimal relief. No complaints of urinary symptoms.

## 2017-10-13 NOTE — DISCHARGE INSTRUCTIONS
"Please make an appointment to follow up with Urology Clinic (phone: [phone]) in 3-5 days for further evaluation and recommendations.     * KIDNEY STONE (w/ Colic)    The sharp cramping pain and nausea/vomiting that you have is due to a small stone which has formed in the kidney and is now passing down a narrow tube (ureter) on its way to your bladder. Once it reaches your bladder, the pain will stop. The stone may pass in your urine stream in one piece. [The size may be 1/16\" to 1/4\" (1-6mm)]. Or, the stone may also break up into ita fragments which you may not even notice.  Once you have had a kidney stone there is a risk for recurrence in the future.  HOME CARE:    Drink lots of fluid (at least 8-10 glasses of water a day).    Most stones will pass on their own, but may take from a few hours to a few days.    Each time you urinate, do so in a jar. Pour the urine from the jar through the strainer and into the toilet. Continue doing this until 24 hours after your pain stops. By then, if there was a kidney stone, it should pass from your bladder. Some stones dissolve into sand-like particles and pass right through the strainer. In that case, you won't ever see a stone.    Save any stone that you find in the strainer and bring it to your doctor for analysis. It may be possible to prevent certain types of stones from forming. Therefore, it is important to know what kind of stone you have.    Try to stay as active as possible since this will help the stone pass. Do not stay in bed unless your pain prevents you from getting up. You may notice a red, pink or brown color to your urine. This is normal while passing a kidney stone.  FOLLOW UP with your doctor or return to this facility if the pain lasts more than 48 hours.  GET PROMPT MEDICAL ATTENTION if any of the following occur:    Pain that is not controlled by the medicine given    Repeated vomiting or unable to keep down fluids    Weakness, dizziness or " fainting    Fever over 101  F (38.3  C)    Passage of solid red or brown urine (can't see through it) or urine with lots of blood clots    Unable to pass urine for 8 hours and increasing bladder pressure    1055-4305 [facility], [address] [place] [address], [place] [address]. All rights reserved. This information is not intended as a substitute for professional medical care. Always follow your healthcare professional's instructions.

## 2017-10-13 NOTE — ED AVS SNAPSHOT
[facility], [place], Emergency Department    [address] [place] [id number]    Phone:  [phone]                                       Marvin Elaine   MRN: [id number]    Department:  [facility], Emergency Department   Date of Visit:  10/13/2017           After Visit Summary Signature Page     I have received my discharge instructions, and my questions have been answered. I have discussed any challenges I see with this plan with the nurse or doctor.    ..........................................................................................................................................  Patient/Patient Representative Signature      ..........................................................................................................................................  Patient Representative Print Name and Relationship to Patient    ..................................................               ................................................  Date                                            Time    ..........................................................................................................................................  Reviewed by Signature/Title    ...................................................              ..............................................  Date                                                            Time

## 2017-10-13 NOTE — ED AVS SNAPSHOT
" [facility], Emergency Department    [address] [facility] [address]    Phone:  [phone]                                       Marvin Elaine   MRN: [id number]    Department:  [facility], Emergency Department   Date of Visit:  10/13/2017           Patient Information     Date Of Birth          1955        Your diagnoses for this visit were:     Calculus of kidney        You were seen by Markus Segura MD.        Discharge Instructions       Please make an appointment to follow up with Urology Clinic (phone: [phone]) in 3-5 days for further evaluation and recommendations.     * KIDNEY STONE (w/ Colic)    The sharp cramping pain and nausea/vomiting that you have is due to a small stone which has formed in the kidney and is now passing down a narrow tube (ureter) on its way to your bladder. Once it reaches your bladder, the pain will stop. The stone may pass in your urine stream in one piece. [The size may be 1/16\" to 1/4\" (1-6mm)]. Or, the stone may also break up into ita fragments which you may not even notice.  Once you have had a kidney stone there is a risk for recurrence in the future.  HOME CARE:    Drink lots of fluid (at least 8-10 glasses of water a day).    Most stones will pass on their own, but may take from a few hours to a few days.    Each time you urinate, do so in a jar. Pour the urine from the jar through the strainer and into the toilet. Continue doing this until 24 hours after your pain stops. By then, if there was a kidney stone, it should pass from your bladder. Some stones dissolve into sand-like particles and pass right through the strainer. In that case, you won't ever see a stone.    Save any stone that you find in the strainer and bring it to your doctor for analysis. It may be possible to prevent certain types of stones from forming. Therefore, it is important to know what kind of stone you have.    Try to stay as active as possible since this will help the " stone pass. Do not stay in bed unless your pain prevents you from getting up. You may notice a red, pink or brown color to your urine. This is normal while passing a kidney stone.  FOLLOW UP with your doctor or return to this facility if the pain lasts more than 48 hours.  GET PROMPT MEDICAL ATTENTION if any of the following occur:    Pain that is not controlled by the medicine given    Repeated vomiting or unable to keep down fluids    Weakness, dizziness or fainting    Fever over 101  F (38.3  C)    Passage of solid red or brown urine (can't see through it) or urine with lots of blood clots    Unable to pass urine for 8 hours and increasing bladder pressure    2564-2634 [facility], [address] [place] [address], [place] [address]. All rights reserved. This information is not intended as a substitute for professional medical care. Always follow your healthcare professional's instructions.        24 Hour Appointment Hotline       To make an appointment at any [facility] clinic, call [phone] ([phone]). If you don't have a family doctor or clinic, we will help you find one. [facility] clinics are conveniently located to serve the needs of you and your family.             Review of your medicines      START taking        Dose / Directions Last dose taken    ibuprofen 200 MG tablet   Commonly known as:  ADVIL/MOTRIN   Dose:  400 mg   Quantity:  60 tablet        Take 2 tablets (400 mg) by mouth every 8 hours as needed for pain   Refills:  0        oxyCODONE-acetaminophen 5-325 MG per tablet   Commonly known as:  PERCOCET   Dose:  1-2 tablet   Quantity:  15 tablet        Take 1-2 tablets by mouth every 4 hours as needed for pain   Refills:  0        tamsulosin 0.4 MG capsule   Commonly known as:  FLOMAX   Dose:  0.4 mg   Quantity:  5 capsule        Take 1 capsule (0.4 mg) by mouth daily for 10 doses   Refills:  0          Our records show that you are taking the medicines listed below. If these are incorrect,  "please call your family doctor or clinic.        Dose / Directions Last dose taken    ALPRAZOLAM PO   Dose:  0.25 mg   Indication:  half tablet 2 BID and PRN        Take 0.25 mg by mouth   Refills:  0        amoxicillin-clavulanate 875-125 MG per tablet   Commonly known as:  AUGMENTIN   Dose:  1 tablet   Quantity:  20 tablet        Take 1 tablet by mouth 2 times daily   Refills:  0        ASPIRIN PO   Dose:  81 mg        Take 81 mg by mouth daily   Refills:  0        latanoprost 0.005 % ophthalmic solution   Commonly known as:  XALATAN   Dose:  1 drop        Place 1 drop Into the left eye daily   Refills:  0        magnesium citrate solution   Dose:  296 mL   Quantity:  296 mL        Take 296 mLs by mouth See Admin Instructions Refer to the \"Getting Ready for a Colonoscopy\" patient handout   Refills:  0        Multi-vitamin Tabs tablet   Dose:  1 tablet        Take 1 tablet by mouth daily   Refills:  0        ondansetron 4 MG ODT tab   Commonly known as:  ZOFRAN ODT   Dose:  4-8 mg   Quantity:  10 tablet        Take 1-2 tablets (4-8 mg) by mouth every 8 hours as needed for nausea   Refills:  0        PEG-KCl-NaCl-NaSulf-Na Asc-C 100 G Solr   Commonly known as:  MOVIPREP   Dose:  1 each   Quantity:  1 each        Take 1 each by mouth See Admin Instructions Refer to \"Getting Ready for a Colonoscopy\" patient handout   Refills:  0        Simethicone 125 MG Caps   Dose:  125 mg   Quantity:  1 capsule        Take 125 mg by mouth See Admin Instructions Refer to \"Getting Ready for a Colonoscopy\" patient handout   Refills:  0        VIIBRYD 40 MG Tabs tablet   Dose:  40 mg   Generic drug:  vilazodone        40 mg   Refills:  0        VITAMIN D (CHOLECALCIFEROL) PO   Dose:  1000 Units        Take 1,000 Units by mouth daily Vitamin D   Refills:  0        WELLBUTRIN 100 MG tablet   Dose:  100 mg   Quantity:  90 tablet   Generic drug:  buPROPion        Take 100 mg by mouth 2 times daily   Refills:  0                Prescriptions " were sent or printed at these locations (3 Prescriptions)                   Other Prescriptions                Printed at Department/Unit printer (3 of 3)         tamsulosin (FLOMAX) 0.4 MG capsule               oxyCODONE-acetaminophen (PERCOCET) 5-325 MG per tablet               ibuprofen (ADVIL/MOTRIN) 200 MG tablet                Procedures and tests performed during your visit     CBC with platelets differential    CT Abdomen Pelvis WITHOUT Contrast (stone protocol)    Comprehensive metabolic panel    Lipase    Peripheral IV: Standard    UA with Microscopic      Orders Needing Specimen Collection     None      Pending Results     No orders found from 10/11/2017 to 10/14/2017.            Pending Culture Results     No orders found from 10/11/2017 to 10/14/2017.            Pending Results Instructions     If you had any lab results that were not finalized at the time of your Discharge, you can call the ED Lab Result RN at [phone]. You will be contacted by this team for any positive Lab results or changes in treatment. The nurses are available 7 days a week from 10A to 6:30P.  You can leave a message 24 hours per day and they will return your call.        Thank you for choosing [facility]       Thank you for choosing [facility] for your care. Our goal is always to provide you with excellent care. Hearing back from our patients is one way we can continue to improve our services. Please take a few minutes to complete the written survey that you may receive in the mail after you visit with us. Thank you!        [facility]art Information     [facility] gives you secure access to your electronic health record. If you see a primary care provider, you can also send messages to your care team and make appointments. If you have questions, please call your primary care clinic.  If you do not have a primary care provider, please call [phone] and they will assist you.        Care EveryWhere ID     This is your Care EveryWhere ID.  This could be used by other organizations to access your [facility] medical records  [id number]        Equal Access to Services     SON SOTO : Hadii melba Ellis, kajal taylor, wilma mcnally. So [facility] [phone].    ATENCIÓN: Si habla español, tiene a pace disposición servicios gratuitos de asistencia lingüística. Llame al [phone].    We comply with applicable federal civil rights laws and [place] laws. We do not discriminate on the basis of race, color, national origin, age, disability, sex, sexual orientation, or gender identity.            After Visit Summary       This is your record. Keep this with you and show to your community pharmacist(s) and doctor(s) at your next visit.

## 2017-10-13 NOTE — PROGRESS NOTES
SUBJECTIVE:   Marvin Elaine is a 62 year old male who presents to clinic today for the following health issues:      Pain started 1.5 hours ago to right low back, radiated to right lower quadrant, now right flank.  Now feeling nauseated but thinks more due to pain.  No emesis.  Urinary stream seems slower, no dysuria or blood in urine.  Stools are looser.  BM this morning, no improvement in symptoms.  Was adeel to eat breakfast this morning.    Has had some chills.  No fever or body aches.      No prior similar episode.  S/p appy 2012.  No hx cholecystectomy.  No hx of kidney stones.  No family hx AAA.  Hx chronic pelvic pain syndrome for several years.  It has been completely under control, just went away on its own a few years ago.  Symptoms included dysuria, difficulty with urine stream, low back and abd pain.  Was never this severe.      Sober x 30 years, no new medications.      Patient Active Problem List   Diagnosis     Anxiety     Major depression     CARDIOVASCULAR SCREENING; LDL GOAL LESS THAN 160     Chronic pelvic pain in male     Chronic pain of right knee     Osteochondritis     Chronic midline low back pain without sciatica     Chronic bilateral low back pain without sciatica     Past Surgical History:   Procedure Laterality Date     LAPAROSCOPIC APPENDECTOMY  1/21/2012    Procedure:LAPAROSCOPIC APPENDECTOMY; Open Appendectomy; Surgeon:GERDA GASTON; Location:[facility] OR       Social History   Substance Use Topics     Smoking status: Never Smoker     Smokeless tobacco: Never Used     Alcohol use No     Family History   Problem Relation Age of Onset     Respiratory Mother      copd     Psychotic Disorder Mother      Prostate Cancer No family hx of      Cancer - colorectal No family hx of      DIABETES No family hx of      Skin Cancer No family hx of      C.A.D. Father      Psychotic Disorder Father      depression, anxiety, alcohol abuse         Current Outpatient Prescriptions  "  Medication Sig Dispense Refill     amoxicillin-clavulanate (AUGMENTIN) 875-125 MG per tablet Take 1 tablet by mouth 2 times daily 20 tablet 0     ondansetron (ZOFRAN ODT) 4 MG ODT tab Take 1-2 tablets (4-8 mg) by mouth every 8 hours as needed for nausea (Patient not taking: Reported on 9/8/2017) 10 tablet 0     Simethicone 125 MG CAPS Take 125 mg by mouth See Admin Instructions Refer to \"Getting Ready for a Colonoscopy\" patient handout (Patient not taking: Reported on 9/8/2017) 1 capsule 0     PEG-KCl-NaCl-NaSulf-Na Asc-C (MOVIPREP) 100 G SOLR Take 1 each by mouth See Admin Instructions Refer to \"Getting Ready for a Colonoscopy\" patient handout (Patient not taking: Reported on 9/8/2017) 1 each 0     magnesium citrate solution Take 296 mLs by mouth See Admin Instructions Refer to the \"Getting Ready for a Colonoscopy\" patient handout (Patient not taking: Reported on 9/8/2017) 296 mL 0     vilazodone (VIIBRYD) 40 MG TABS tablet 40 mg       ASPIRIN PO Take 81 mg by mouth daily       VITAMIN D, CHOLECALCIFEROL, PO Take 1,000 Units by mouth daily Vitamin D       latanoprost (XALATAN) 0.005 % ophthalmic solution Place 1 drop Into the left eye daily   0     multivitamin, therapeutic with minerals (MULTI-VITAMIN) TABS Take 1 tablet by mouth daily       ALPRAZOLAM PO Take 0.25 mg by mouth       buPROPion (WELLBUTRIN) 100 MG tablet Take 100 mg by mouth 2 times daily  90 tablet        ROS:  Const, GI,  as above, otherwise negative       OBJECTIVE:                                                    /87 (BP Location: Right arm, Patient Position: Chair, Cuff Size: Adult Regular)  Pulse 71  Temp 97.3  F (36.3  C) (Tympanic)  SpO2 97%   GENERAL APPEARANCE: patient is kneeling over chair rocking in pain with emesis bag next to him, it is difficult for him to sit on table or walk due to pain   EYES: Eyes grossly normal to inspection and conjunctivae and sclerae normal  RESP: respirations nonlabored   ABDOMEN: pain with " palpation of right low back, no CVA tenderness.  Abdomen is flat, does have some guarding with palpation but when he is able to relax abdomen I am able to palpate without guarding.  Pain with light palpation generalized, worse to RLQ.    PSYCH: mentation appears normal and affect normal/bright        ASSESSMENT/PLAN:                                                    (M54.5) Acute right-sided low back pain without sciatica  (primary encounter diagnosis)  (R10.31) Abdominal pain, right lower quadrant  Comment: consider ruptured AAA (though blood pressure is stable) v. Renal colic.  No urinary symptoms or CVA tenderness or fever to suggest pyelo.  No ETOH use or new meds to suggest pancreatitis.  Pt is s/p appy.    Plan: Given the amount of pain pt is in I feel ER evaluation is warranted for imaging (US v. CT of abdomen) as well as pain control.  I did not obtain UA or labs as patient will be evaluated in the ER.  The patient drove himself here, and ambulance was called to transport him to the ER.  Report was called to an ER provider.  The patient was in agreement with ambulance transport and ER evaluation.      See Patient Instructions    Maureen Rdz, [facility]    There are no Patient Instructions on file for this visit.

## 2017-10-13 NOTE — NURSING NOTE
"Chief Complaint   Patient presents with     Abdominal Pain     Back Pain       Initial /87 (BP Location: Right arm, Patient Position: Chair, Cuff Size: Adult Regular)  Pulse 71  Temp 97.3  F (36.3  C) (Tympanic)  SpO2 97% Estimated body mass index is 19.46 kg/(m^2) as calculated from the following:    Height as of 9/8/17: 5' 8\" (1.727 m).    Weight as of 9/8/17: 128 lb (58.1 kg).  Medication Reconciliation: unable or not appropriate to perform    "

## 2017-10-13 NOTE — ED PROVIDER NOTES
History     Chief Complaint   Patient presents with     Flank Pain     HPI  Marvin Elaine is a 62 year old male with a history of anxiety, depression, and prostate infection, s/p appendectomy who presents for evaluation of flank pain. Patient complains of severe, constant, non-radiating, sharp right-sided flank pain that began this morning; nothing makes the pain better or worse. He denies fevers, nausea or vomiting, or prior history of kidney stones. He denies hematuria.     I have reviewed the Medications, Allergies, Past Medical and Surgical History, and Social History in the [employer] system.  Past Medical History:   Diagnosis Date     Anxiety      Depressive disorder      Prostate infection        Past Surgical History:   Procedure Laterality Date     LAPAROSCOPIC APPENDECTOMY  1/21/2012    Procedure:LAPAROSCOPIC APPENDECTOMY; Open Appendectomy; Surgeon:GERDA GASTON; Location:[facility] OR       Family History   Problem Relation Age of Onset     Respiratory Mother      copd     Psychotic Disorder Mother      Prostate Cancer No family hx of      Cancer - colorectal No family hx of      DIABETES No family hx of      Skin Cancer No family hx of      C.A.D. Father      Psychotic Disorder Father      depression, anxiety, alcohol abuse       Social History   Substance Use Topics     Smoking status: Never Smoker     Smokeless tobacco: Never Used     Alcohol use No       No current facility-administered medications for this encounter.      Current Outpatient Prescriptions   Medication     tamsulosin (FLOMAX) 0.4 MG capsule     oxyCODONE-acetaminophen (PERCOCET) 5-325 MG per tablet     ibuprofen (ADVIL/MOTRIN) 200 MG tablet     amoxicillin-clavulanate (AUGMENTIN) 875-125 MG per tablet     ondansetron (ZOFRAN ODT) 4 MG ODT tab     Simethicone 125 MG CAPS     PEG-KCl-NaCl-NaSulf-Na Asc-C (MOVIPREP) 100 G SOLR     magnesium citrate solution     vilazodone (VIIBRYD) 40 MG TABS tablet     ASPIRIN PO     VITAMIN D,  CHOLECALCIFEROL, PO     latanoprost (XALATAN) 0.005 % ophthalmic solution     multivitamin, therapeutic with minerals (MULTI-VITAMIN) TABS     ALPRAZOLAM PO     buPROPion (WELLBUTRIN) 100 MG tablet      No Known Allergies    Review of Systems   Constitutional: Negative for fever.   Respiratory: Negative for shortness of breath.    Cardiovascular: Negative for chest pain.   Gastrointestinal: Negative for abdominal pain, nausea and vomiting.   Genitourinary: Positive for flank pain. Negative for hematuria.   All other systems reviewed and are negative.      Physical Exam   BP: (!) 165/92  Heart Rate: 77  Temp: 98.1  F (36.7  C)  Resp: 16  SpO2: 100 %       Physical Exam   Constitutional: He is oriented to person, place, and time. He appears well-developed and well-nourished. No distress.   HENT:   Head: Normocephalic and atraumatic.   Mouth/Throat: Oropharynx is clear and moist. No oropharyngeal exudate.   Eyes: Conjunctivae and EOM are normal. No scleral icterus.   Neck: Normal range of motion.   Cardiovascular: Normal rate, normal heart sounds and intact distal pulses.    Pulmonary/Chest: Effort normal and breath sounds normal. No respiratory distress. He has no wheezes. He has no rales.   Abdominal: Soft. Bowel sounds are normal. He exhibits no distension. There is no tenderness. There is no rebound, no guarding, no CVA tenderness, no tenderness at McBurney's point and negative Davis's sign.   Musculoskeletal: He exhibits no edema or tenderness.   Neurological: He is alert and oriented to person, place, and time. No cranial nerve deficit. He exhibits normal muscle tone. Coordination normal.   Skin: Skin is warm. No rash noted. He is not diaphoretic.   Psychiatric: He has a normal mood and affect. His behavior is normal. Judgment and thought content normal.   Nursing note and vitals reviewed.      ED Course     ED Course     Procedures       1:44 PM  The patient was seen and examined by Dr. Segura in Room 6.           Critical Care time:  none             Labs Ordered and Resulted from Time of ED Arrival Up to the Time of Departure from the ED   CBC WITH PLATELETS DIFFERENTIAL - Abnormal; Notable for the following:        Result Value    Hemoglobin 12.2 (*)     Hematocrit 39.7 (*)     MCHC 30.7 (*)     All other components within normal limits   COMPREHENSIVE METABOLIC PANEL - Abnormal; Notable for the following:     Glucose 103 (*)     Creatinine 1.35 (*)     GFR Estimate 53 (*)     Protein Total 6.4 (*)     All other components within normal limits   ROUTINE UA WITH MICROSCOPIC - Abnormal; Notable for the following:     Blood Urine Moderate (*)     pH Urine 7.5 (*)     Protein Albumin Urine 10 (*)     RBC Urine 113 (*)     Mucous Urine Present (*)     Hyaline Casts 3 (*)     All other components within normal limits   LIPASE   PERIPHERAL IV CATHETER            Assessments & Plan (with Medical Decision Making)   This is a 62 year old male presenting with right flank pain since this morning. Differential diagnosis: kidney stone, AAA, pyelonephritis, SBO, volvulus.     After thorough history and physical examination, patient appears to be in mild distress secondary to pain. I will treat his pain with IV Toradol and obtain laboratory studies and CT abdomen/pelvis for diagnostic evaluation. He agrees with the plan.    Patient's laboratory studies returned without any evidence of leukocytosis, WBC is normal at 8,400. There is mild anemia, hemoglobin is  12.2 .  Electrolytes show milddehydration, creatinine is 1.35. He did receive a 500 cc bolus of IV saline from EMS. LFTs and lipase are normal. UA shows hematuria, but no signs of infection. I reviewed CT abdomen/pelvis and I discussed it with the Radiologist; there is a 2mm obstructing the right UVJ renal stone with mild hydronephrosis. On reexamination, patient's pain has resolved. He was given a dose of oral Flomax. He will be discharged with a prescription for Percocet,  Flomax, and ibuprofen, with a strainer and sterile cup and Urology follow up. He agrees with the plan. He'll return if his symptoms worsen.     I have reviewed the nursing notes.    I have reviewed the findings, diagnosis, plan and need for follow up with the patient.    Discharge Medication List as of 10/13/2017  2:10 PM      START taking these medications    Details   tamsulosin (FLOMAX) 0.4 MG capsule Take 1 capsule (0.4 mg) by mouth daily for 10 doses, Disp-5 capsule, R-0, Local Print      oxyCODONE-acetaminophen (PERCOCET) 5-325 MG per tablet Take 1-2 tablets by mouth every 4 hours as needed for pain, Disp-15 tablet, R-0, Local Print      ibuprofen (ADVIL/MOTRIN) 200 MG tablet Take 2 tablets (400 mg) by mouth every 8 hours as needed for pain, Disp-60 tablet, R-0, Local Print             Final diagnoses:   Calculus of kidney   I, Libby Jackson, am serving as a trained medical scribe to document services personally performed by Markus Segura MD, based on the provider's statements to me.   IMarkus MD, was physically present and have reviewed and verified the accuracy of this note documented by Libby Jackson.        10/13/2017   [facility], [place], EMERGENCY DEPARTMENT     Markus Segura MD  10/13/17 0021

## 2017-10-13 NOTE — MR AVS SNAPSHOT
After Visit Summary   10/13/2017    Marvin Elaine    MRN: [id number]           Patient Information     Date Of Birth          1955        Visit Information        Provider Department      10/13/2017 12:00 PM Maureen Rdz APRN CNP [facility]        Today's Diagnoses     Acute right-sided low back pain without sciatica    -  1    Abdominal pain, right lower quadrant           Follow-ups after your visit        Who to contact     If you have questions or need follow up information about today's clinic visit or your schedule please contact [facility] directly at [phone].  Normal or non-critical lab and imaging results will be communicated to you by [employer], letter or phone within 4 business days after the clinic has received the results. If you do not hear from us within 7 days, please contact the clinic through [employer] or phone. If you have a critical or abnormal lab result, we will notify you by phone as soon as possible.  Submit refill requests through [employer] or call your pharmacy and they will forward the refill request to us. Please allow 3 business days for your refill to be completed.          Additional Information About Your Visit        MyChart Information     [employer] gives you secure access to your electronic health record. If you see a primary care provider, you can also send messages to your care team and make appointments. If you have questions, please call your primary care clinic.  If you do not have a primary care provider, please call [phone] and they will assist you.        Care EveryWhere ID     This is your Care EveryWhere ID. This could be used by other organizations to access your [place] medical records  [id number]        Your Vitals Were     Pulse Temperature Pulse Oximetry             71 97.3  F (36.3  C) (Tympanic) 97%          Blood Pressure from Last 3 Encounters:   10/13/17 155/87   09/08/17 130/76   11/22/16  104/66    Weight from Last 3 Encounters:   09/08/17 128 lb (58.1 kg)   11/22/16 122 lb (55.3 kg)   07/08/16 122 lb (55.3 kg)              Today, you had the following     No orders found for display       Primary Care Provider Office Phone # Fax #    Man Daniel Irwin Wegener, [phone]       [address]  [facility] [address]        Equal Access to Services     SON SOTO : Hadii aad ku hadasho Soomaali, waaxda luqadaha, qaybta kaalmada adeegyada, waxay idiin hayaan adeeg kharash la'aan ah. So [facility] [phone].    ATENCIÓN: Si habla español, tiene a pace disposición servicios gratuitos de asistencia lingüística. [facility] [phone].    We comply with applicable federal civil rights laws and [place] laws. We do not discriminate on the basis of race, color, national origin, age, disability, sex, sexual orientation, or gender identity.            Thank you!     Thank you for choosing [facility]  for your care. Our goal is always to provide you with excellent care. Hearing back from our patients is one way we can continue to improve our services. Please take a few minutes to complete the written survey that you may receive in the mail after your visit with us. Thank you!             Your Updated Medication List - Protect others around you: Learn how to safely use, store and throw away your medicines at www.disposemymeds.org.          This list is accurate as of: 10/13/17 12:18 PM.  Always use your most recent med list.                   Brand Name Dispense Instructions for use Diagnosis    ALPRAZOLAM PO      Take 0.25 mg by mouth        amoxicillin-clavulanate 875-125 MG per tablet    AUGMENTIN    20 tablet    Take 1 tablet by mouth 2 times daily    Cat bite of left hand, initial encounter       ASPIRIN PO      Take 81 mg by mouth daily        latanoprost 0.005 % ophthalmic solution    XALATAN     Place 1 drop Into the left eye daily        magnesium citrate solution     296 mL     "Take 296 mLs by mouth See Admin Instructions Refer to the \"Getting Ready for a Colonoscopy\" patient handout    Encounter for screening colonoscopy       Multi-vitamin Tabs tablet      Take 1 tablet by mouth daily        ondansetron 4 MG ODT tab    ZOFRAN ODT    10 tablet    Take 1-2 tablets (4-8 mg) by mouth every 8 hours as needed for nausea    Nausea       PEG-KCl-NaCl-NaSulf-Na Asc-C 100 G Solr    MOVIPREP    1 each    Take 1 each by mouth See Admin Instructions Refer to \"Getting Ready for a Colonoscopy\" patient handout    Encounter for screening colonoscopy       Simethicone 125 MG Caps     1 capsule    Take 125 mg by mouth See Admin Instructions Refer to \"Getting Ready for a Colonoscopy\" patient handout    Encounter for screening colonoscopy       VIIBRYD 40 MG Tabs tablet   Generic drug:  vilazodone      40 mg        VITAMIN D (CHOLECALCIFEROL) PO      Take 1,000 Units by mouth daily Vitamin D        WELLBUTRIN 100 MG tablet   Generic drug:  buPROPion     90 tablet    Take 100 mg by mouth 2 times daily          "

## 2018-04-19 ENCOUNTER — THERAPY VISIT (OUTPATIENT)
Dept: PHYSICAL THERAPY | Facility: CLINIC | Age: 63
End: 2018-04-19
Payer: COMMERCIAL

## 2018-04-19 DIAGNOSIS — M25.511 CHRONIC RIGHT SHOULDER PAIN: Primary | ICD-10-CM

## 2018-04-19 DIAGNOSIS — G89.29 CHRONIC BILATERAL LOW BACK PAIN WITHOUT SCIATICA: ICD-10-CM

## 2018-04-19 DIAGNOSIS — M54.50 CHRONIC BILATERAL LOW BACK PAIN WITHOUT SCIATICA: ICD-10-CM

## 2018-04-19 DIAGNOSIS — G89.29 CHRONIC RIGHT SHOULDER PAIN: Primary | ICD-10-CM

## 2018-04-19 PROCEDURE — 97110 THERAPEUTIC EXERCISES: CPT | Mod: GP | Performed by: PHYSICAL THERAPIST

## 2018-04-19 PROCEDURE — 97161 PT EVAL LOW COMPLEX 20 MIN: CPT | Mod: GP | Performed by: PHYSICAL THERAPIST

## 2018-04-19 NOTE — PROGRESS NOTES
[facility] Initial Evaluation  Subjective:  Patient is a 62 year old male presenting with rehab right shoulder hpi.   Marvin Elaine is a 62 year old male with a right shoulder condition.  Condition occurred with:  Repetition/overuse.  Condition occurred: at home and in the community.  This is a chronic condition  Feb. 2018. Patient reports onset of R upper arm pain with using computer about 2 months ago. He has since had to stop performing bench press and rowing exercises d/t pain. Denies history of shoulder problems..    Patient reports pain:  Lateral.  Radiates to:  Upper arm and elbow.  Pain is described as sharp and is intermittent and reported as 5/10 and 7/10.  Associated symptoms:  Loss of motion/stiffness, loss of strength and tingling (intermittent tingling R upper arm and elbow). Pain is the same all the time.  Symptoms are exacerbated by using arm overhead, using arm at shoulder level, lifting, other and using arm behind back (pushing, pulling) and relieved by NSAID's.  Since onset symptoms are unchanged.        General health as reported by patient is excellent.  Pertinent medical history includes:  Chemical dependency and depression.  Medical allergies: no.  Other surgeries include:  Other (appendectomy).  Current medications:  Anti-depressants.  Current occupation is .  Patient is working in normal job without restrictions.  Primary job tasks include:  Other (computer work).    Barriers include:  None as reported by the patient.    Red flags:  None as reported by the patient.                        Objective:  Standing Alignment:    Cervical/Thoracic:  Forward head  Shoulder/UE:  Rounded shoulders                                       Shoulder Evaluation:  ROM:  AROM:    Flexion:  Left:  WNL    Right:  WNL -    Abduction:  Left: WNL   Right:  WNL +      External Rotation:  Left:  WNL    Right:  WNL -            Extension/Internal Rotation:  Left:  WNL    Right:  WNL -/+           Strength:    Flexion: Left:5/5    Pain: -    Right: 5/5      Pain:  -    Abduction:  Left: 5/5   Pain:-    Right: 5/5      Pain:-    Internal Rotation:  Left:5/5      Pain:-    Right: 4+/5      Pain:-/+  External Rotation:   Left:5/5      Pain:-   Right:4+/5      Pain:-/+        Elbow Flexion:  Left:5/5      Pain:-    Right:5/5      Pain:-/+  Elbow Extension:  Left:5/5      Pain:-    Right:5/5      Pain:-/+    Special Tests:      Left shoulder negative for the following special tests:  Rotator cuff tear    Right shoulder negative for the following special tests:Rotator cuff tear  Palpation:      Right shoulder tenderness present at: Supraspinatus                                     General     ROS    Assessment/Plan:    Patient is a 62 year old male with right side shoulder complaints.    Patient has the following significant findings with corresponding treatment plan.                Diagnosis 1:  R RC tendinopathy  Pain -  hot/cold therapy, manual therapy, self management, education and home program  Decreased strength - therapeutic exercise, therapeutic activities and home program  Decreased proprioception - neuro re-education, therapeutic activities and home program  Impaired muscle performance - neuro re-education and home program  Decreased function - therapeutic activities and home program  Impaired posture - neuro re-education and home program    Therapy Evaluation Codes:   1) History comprised of:   Personal factors that impact the plan of care:      None.    Comorbidity factors that impact the plan of care are:      Chemical Dependency and Depression.     Medications impacting care: Anti-depressant.  2) Examination of Body Systems comprised of:   Body structures and functions that impact the plan of care:      Shoulder.   Activity limitations that impact the plan of care are:      Bathing, Cooking, Driving, Dressing and Lifting.  3) Clinical presentation characteristics  are:   Stable/Uncomplicated.  4) Decision-Making    Low complexity using standardized patient assessment instrument and/or measureable assessment of functional outcome.  Cumulative Therapy Evaluation is: Low complexity.    Previous and current functional limitations:  (See Goal Flow Sheet for this information)    Short term and Long term goals: (See Goal Flow Sheet for this information)     Communication ability:  Patient appears to be able to clearly communicate and understand verbal and written communication and follow directions correctly.  Treatment Explanation - The following has been discussed with the patient:   RX ordered/plan of care  Anticipated outcomes  Possible risks and side effects  This patient would benefit from PT intervention to resume normal activities.   Rehab potential is good.    Frequency:  1 X week, once daily  Duration:  for 6 weeks tapering to 1 X every other week over 4 weeks  Discharge Plan:  Achieve all LTG.  Independent in home treatment program.  Reach maximal therapeutic benefit.    Please refer to the daily flowsheet for treatment today, total treatment time and time spent performing 1:1 timed codes.

## 2018-04-19 NOTE — MR AVS SNAPSHOT
After Visit Summary   4/19/2018    Marvin Elaine    MRN: [id number]           Patient Information     Date Of Birth          1955        Visit Information        Provider Department      4/19/2018 12:40 PM Steve Londono, PT [facility]        Today's Diagnoses     Chronic bilateral low back pain without sciatica           Follow-ups after your visit        Your next 10 appointments already scheduled     May 10, 2018 11:30 AM CDT   DILIP Extremity with Steve Londono PT   [facility] Physical Therapy ([facility]  )    [address] [place] [id number] [phone]            May 17, 2018 11:30 AM CDT   DILIP Extremity with Steve Londono PT   [facility] Physical Therapy ([facility]  )    [address] [place] [id number] [phone]              Who to contact     If you have questions or need follow up information about today's clinic visit or your schedule please contact [facility] PHYSICAL THERAPY directly at [phone].  Normal or non-critical lab and imaging results will be communicated to you by MyChart, letter or phone within 4 business days after the clinic has received the results. If you do not hear from us within 7 days, please contact the clinic through [employer] or phone. If you have a critical or abnormal lab result, we will notify you by phone as soon as possible.  Submit refill requests through [employer] or call your pharmacy and they will forward the refill request to us. Please allow 3 business days for your refill to be completed.          Additional Information About Your Visit        MyChart Information     [employer] gives you secure access to your electronic health record. If you see a primary care provider, you can also send messages to your care team and make appointments. If  you have questions, please call your primary care clinic.  If you do not have a primary care provider, please call [phone] and they will assist you.        Care EveryWhere ID     This is your Care EveryWhere ID. This could be used by other organizations to access your [facility] medical records  [id number]         Blood Pressure from Last 3 Encounters:   10/13/17 135/87   10/13/17 155/87   09/08/17 130/76    Weight from Last 3 Encounters:   09/08/17 58.1 kg (128 lb)   11/22/16 55.3 kg (122 lb)   07/08/16 55.3 kg (122 lb)              We Performed the Following     DILIP Inital Eval Report     PT Eval, Low Complexity (83074)     Therapeutic Exercises        Primary Care Provider Office Phone # Fax #    Joel Daniel Irwin Wegener, [phone] [address]  [facility] [address]        Equal Access to Services     SON SOTO AH: Hadii aad ku hadasho Soomaali, waaxda luqadaha, qaybta kaalmada adeegyada, waxay idiin haymacn toby kharakerrie james . So [facility] [phone].    ATENCIÓN: Si tim alvarez, tiene a pace disposición servicios gratuitos de asistencia lingüística. Llame al [phone].    We comply with applicable federal civil rights laws and [place] laws. We do not discriminate on the basis of race, color, national origin, age, disability, sex, sexual orientation, or gender identity.            Thank you!     Thank you for choosing [facility] PHYSICAL THERAPY  for your care. Our goal is always to provide you with excellent care. Hearing back from our patients is one way we can continue to improve our services. Please take a few minutes to complete the written survey that you may receive in the mail after your visit with us. Thank you!             Your Updated Medication List - Protect others around you: Learn how to safely use, store and throw away your medicines at www.disposemymeds.org.          This list is accurate as of 4/19/18  4:14 PM.  Always  "use your most recent med list.                   Brand Name Dispense Instructions for use Diagnosis    ALPRAZOLAM PO      Take 0.25 mg by mouth        amoxicillin-clavulanate 875-125 MG per tablet    AUGMENTIN    20 tablet    Take 1 tablet by mouth 2 times daily    Cat bite of left hand, initial encounter       ASPIRIN PO      Take 81 mg by mouth daily        ibuprofen 200 MG tablet    ADVIL/MOTRIN    60 tablet    Take 2 tablets (400 mg) by mouth every 8 hours as needed for pain        latanoprost 0.005 % ophthalmic solution    XALATAN     Place 1 drop Into the left eye daily        magnesium citrate solution     296 mL    Take 296 mLs by mouth See Admin Instructions Refer to the \"Getting Ready for a Colonoscopy\" patient handout    Encounter for screening colonoscopy       Multi-vitamin Tabs tablet      Take 1 tablet by mouth daily        ondansetron 4 MG ODT tab    ZOFRAN ODT    10 tablet    Take 1-2 tablets (4-8 mg) by mouth every 8 hours as needed for nausea    Nausea       oxyCODONE-acetaminophen 5-325 MG per tablet    PERCOCET    15 tablet    Take 1-2 tablets by mouth every 4 hours as needed for pain        PEG-KCl-NaCl-NaSulf-Na Asc-C 100 g Solr    MOVIPREP    1 each    Take 1 each by mouth See Admin Instructions Refer to \"Getting Ready for a Colonoscopy\" patient handout    Encounter for screening colonoscopy       Simethicone 125 MG Caps     1 capsule    Take 125 mg by mouth See Admin Instructions Refer to \"Getting Ready for a Colonoscopy\" patient handout    Encounter for screening colonoscopy       VIIBRYD 40 MG Tabs tablet   Generic drug:  vilazodone      40 mg        VITAMIN D (CHOLECALCIFEROL) PO      Take 1,000 Units by mouth daily Vitamin D        WELLBUTRIN 100 MG tablet   Generic drug:  buPROPion     90 tablet    Take 100 mg by mouth 2 times daily          "

## 2018-04-26 ENCOUNTER — THERAPY VISIT (OUTPATIENT)
Dept: PHYSICAL THERAPY | Facility: CLINIC | Age: 63
End: 2018-04-26
Payer: COMMERCIAL

## 2018-04-26 DIAGNOSIS — G89.29 CHRONIC RIGHT SHOULDER PAIN: ICD-10-CM

## 2018-04-26 DIAGNOSIS — M25.511 CHRONIC RIGHT SHOULDER PAIN: ICD-10-CM

## 2018-04-26 PROCEDURE — 97112 NEUROMUSCULAR REEDUCATION: CPT | Mod: GP | Performed by: PHYSICAL THERAPIST

## 2018-04-26 PROCEDURE — 97530 THERAPEUTIC ACTIVITIES: CPT | Mod: GP | Performed by: PHYSICAL THERAPIST

## 2018-04-26 PROCEDURE — 97110 THERAPEUTIC EXERCISES: CPT | Mod: GP | Performed by: PHYSICAL THERAPIST

## 2018-05-10 ENCOUNTER — THERAPY VISIT (OUTPATIENT)
Dept: PHYSICAL THERAPY | Facility: CLINIC | Age: 63
End: 2018-05-10
Payer: COMMERCIAL

## 2018-05-10 DIAGNOSIS — G89.29 CHRONIC RIGHT SHOULDER PAIN: ICD-10-CM

## 2018-05-10 DIAGNOSIS — M25.511 CHRONIC RIGHT SHOULDER PAIN: ICD-10-CM

## 2018-05-10 PROCEDURE — 97112 NEUROMUSCULAR REEDUCATION: CPT | Mod: GP | Performed by: PHYSICAL THERAPIST

## 2018-05-10 PROCEDURE — 97110 THERAPEUTIC EXERCISES: CPT | Mod: GP | Performed by: PHYSICAL THERAPIST

## 2018-05-10 PROCEDURE — 97140 MANUAL THERAPY 1/> REGIONS: CPT | Mod: GP | Performed by: PHYSICAL THERAPIST

## 2018-05-17 ENCOUNTER — THERAPY VISIT (OUTPATIENT)
Dept: PHYSICAL THERAPY | Facility: CLINIC | Age: 63
End: 2018-05-17
Payer: COMMERCIAL

## 2018-05-17 DIAGNOSIS — G89.29 CHRONIC RIGHT SHOULDER PAIN: ICD-10-CM

## 2018-05-17 DIAGNOSIS — M25.511 CHRONIC RIGHT SHOULDER PAIN: ICD-10-CM

## 2018-05-17 PROCEDURE — 97110 THERAPEUTIC EXERCISES: CPT | Mod: GP | Performed by: PHYSICAL THERAPIST

## 2018-05-17 PROCEDURE — 97140 MANUAL THERAPY 1/> REGIONS: CPT | Mod: GP | Performed by: PHYSICAL THERAPIST

## 2018-05-17 PROCEDURE — 97112 NEUROMUSCULAR REEDUCATION: CPT | Mod: GP | Performed by: PHYSICAL THERAPIST

## 2018-05-31 ENCOUNTER — THERAPY VISIT (OUTPATIENT)
Dept: PHYSICAL THERAPY | Facility: CLINIC | Age: 63
End: 2018-05-31
Payer: COMMERCIAL

## 2018-05-31 DIAGNOSIS — M25.511 CHRONIC RIGHT SHOULDER PAIN: ICD-10-CM

## 2018-05-31 DIAGNOSIS — G89.29 CHRONIC RIGHT SHOULDER PAIN: ICD-10-CM

## 2018-05-31 PROCEDURE — 97110 THERAPEUTIC EXERCISES: CPT | Mod: GP | Performed by: PHYSICAL THERAPIST

## 2018-05-31 PROCEDURE — 97112 NEUROMUSCULAR REEDUCATION: CPT | Mod: GP | Performed by: PHYSICAL THERAPIST

## 2018-05-31 PROCEDURE — 97140 MANUAL THERAPY 1/> REGIONS: CPT | Mod: GP | Performed by: PHYSICAL THERAPIST

## 2018-05-31 NOTE — MR AVS SNAPSHOT
After Visit Summary   5/31/2018    Marvin Elaine    MRN: [id number]           Patient Information     Date Of Birth          1955        Visit Information        Provider Department      5/31/2018 1:20 PM Steve Londono PT [facility]3] Athletic [facility] Physical [facility]        Today's Diagnoses     Chronic right shoulder pain           Follow-ups after your visit        Who to contact     If you have questions or need follow up information about today's clinic visit or your schedule please contact [facility] ATHLETIC [facility] PHYSICAL [facility] directly at [phone].  Normal or non-critical lab and imaging results will be communicated to you by CyberVision Texthart, letter or phone within 4 business days after the clinic has received the results. If you do not hear from us within 7 days, please contact the clinic through SepSensort or phone. If you have a critical or abnormal lab result, we will notify you by phone as soon as possible.  Submit refill requests through TouchBistro or call your pharmacy and they will forward the refill request to us. Please allow 3 business days for your refill to be completed.          Additional Information About Your Visit        MyChart Information     TouchBistro gives you secure access to your electronic health record. If you see a primary care provider, you can also send messages to your care team and make appointments. If you have questions, please call your primary care clinic.  If you do not have a primary care provider, please call [phone] and they will assist you.        Care EveryWhere ID     This is your Care EveryWhere ID. This could be used by other organizations to access your [place] medical records  [id number]         Blood Pressure from Last 3 Encounters:   10/13/17 135/87   10/13/17 155/87   09/08/17 130/76    Weight from Last 3 Encounters:   09/08/17 58.1 kg (128 lb)   11/22/16 55.3 kg (122 lb)   07/08/16 55.3 kg  (122 lb)              We Performed the Following     Manual Ther Tech, 1+Regions, EA 15 min     Neuromuscular Re-Education     Therapeutic Exercises        Primary Care Provider Office Phone # Fax #    Joel Daniel Irwin Wegener, [phone] [address]  [facility] [address]        Equal Access to Services     MOLLYAICHA ARENASPATY : Hadii aad ku hadasho Soomaali, waaxda luqadaha, qaybta kaalmada adeegyada, waxay idiin hayaan adeeg khclaudiash la'aan . So [facility] [phone].    ATENCIÓN: Si habla español, tiene a pace disposición servicios gratuitos de asistencia lingüística. [facility] [phone].    We comply with applicable federal civil rights laws and [place] laws. We do not discriminate on the basis of race, color, national origin, age, disability, sex, sexual orientation, or gender identity.            Thank you!     Thank you for choosing [place] [facility] ATHLETIC MEDICINE [facility] PHYSICAL THERAPY  for your care. Our goal is always to provide you with excellent care. Hearing back from our patients is one way we can continue to improve our services. Please take a few minutes to complete the written survey that you may receive in the mail after your visit with us. Thank you!             Your Updated Medication List - Protect others around you: Learn how to safely use, store and throw away your medicines at www.disposemymeds.org.          This list is accurate as of 5/31/18  2:14 PM.  Always use your most recent med list.                   Brand Name Dispense Instructions for use Diagnosis    ALPRAZOLAM PO      Take 0.25 mg by mouth        amoxicillin-clavulanate 875-125 MG per tablet    AUGMENTIN    20 tablet    Take 1 tablet by mouth 2 times daily    Cat bite of left hand, initial encounter       ASPIRIN PO      Take 81 mg by mouth daily        ibuprofen 200 MG tablet    ADVIL/MOTRIN    60 tablet    Take 2 tablets (400 mg) by mouth every 8 hours as needed for pain        latanoprost 0.005 %  "ophthalmic solution    XALATAN     Place 1 drop Into the left eye daily        magnesium citrate solution     296 mL    Take 296 mLs by mouth See Admin Instructions Refer to the \"Getting Ready for a Colonoscopy\" patient handout    Encounter for screening colonoscopy       Multi-vitamin Tabs tablet      Take 1 tablet by mouth daily        ondansetron 4 MG ODT tab    ZOFRAN ODT    10 tablet    Take 1-2 tablets (4-8 mg) by mouth every 8 hours as needed for nausea    Nausea       oxyCODONE-acetaminophen 5-325 MG per tablet    PERCOCET    15 tablet    Take 1-2 tablets by mouth every 4 hours as needed for pain        PEG-KCl-NaCl-NaSulf-Na Asc-C 100 g Solr    MOVIPREP    1 each    Take 1 each by mouth See Admin Instructions Refer to \"Getting Ready for a Colonoscopy\" patient handout    Encounter for screening colonoscopy       Simethicone 125 MG Caps     1 capsule    Take 125 mg by mouth See Admin Instructions Refer to \"Getting Ready for a Colonoscopy\" patient handout    Encounter for screening colonoscopy       VIIBRYD 40 MG Tabs tablet   Generic drug:  vilazodone      40 mg        VITAMIN D (CHOLECALCIFEROL) PO      Take 1,000 Units by mouth daily Vitamin D        WELLBUTRIN 100 MG tablet   Generic drug:  buPROPion     90 tablet    Take 100 mg by mouth 2 times daily          "

## 2018-10-23 ENCOUNTER — OFFICE VISIT (OUTPATIENT)
Dept: FAMILY MEDICINE | Facility: CLINIC | Age: 63
End: 2018-10-23
Payer: COMMERCIAL

## 2018-10-23 VITALS
RESPIRATION RATE: 18 BRPM | DIASTOLIC BLOOD PRESSURE: 85 MMHG | WEIGHT: 127 LBS | OXYGEN SATURATION: 100 % | SYSTOLIC BLOOD PRESSURE: 134 MMHG | BODY MASS INDEX: 19.25 KG/M2 | HEART RATE: 75 BPM | TEMPERATURE: 97.6 F | HEIGHT: 68 IN

## 2018-10-23 DIAGNOSIS — Z23 NEED FOR PROPHYLACTIC VACCINATION AND INOCULATION AGAINST INFLUENZA: ICD-10-CM

## 2018-10-23 DIAGNOSIS — G25.0 ESSENTIAL TREMOR: Primary | ICD-10-CM

## 2018-10-23 DIAGNOSIS — Z23 NEED FOR SHINGLES VACCINE: ICD-10-CM

## 2018-10-23 LAB — TSH SERPL DL<=0.005 MIU/L-ACNC: 1.12 MU/L (ref 0.4–4)

## 2018-10-23 PROCEDURE — 90750 HZV VACC RECOMBINANT IM: CPT | Performed by: FAMILY MEDICINE

## 2018-10-23 PROCEDURE — 90471 IMMUNIZATION ADMIN: CPT | Performed by: FAMILY MEDICINE

## 2018-10-23 PROCEDURE — 90662 IIV NO PRSV INCREASED AG IM: CPT | Performed by: FAMILY MEDICINE

## 2018-10-23 PROCEDURE — 99213 OFFICE O/P EST LOW 20 MIN: CPT | Mod: 25 | Performed by: FAMILY MEDICINE

## 2018-10-23 PROCEDURE — 84443 ASSAY THYROID STIM HORMONE: CPT | Performed by: FAMILY MEDICINE

## 2018-10-23 PROCEDURE — 36415 COLL VENOUS BLD VENIPUNCTURE: CPT | Performed by: FAMILY MEDICINE

## 2018-10-23 PROCEDURE — 90472 IMMUNIZATION ADMIN EACH ADD: CPT | Performed by: FAMILY MEDICINE

## 2018-10-23 RX ORDER — PROPRANOLOL HCL 60 MG
60 CAPSULE, EXTENDED RELEASE 24HR ORAL DAILY
Qty: 90 CAPSULE | Refills: 3 | Status: SHIPPED | OUTPATIENT
Start: 2018-10-23 | End: 2021-10-21

## 2018-10-23 RX ORDER — BREXPIPRAZOLE 0.5 MG/1
TABLET ORAL
Refills: 5 | COMMUNITY
Start: 2018-10-09 | End: 2021-10-21

## 2018-10-23 NOTE — NURSING NOTE
Screening Questionnaire for Adult Immunization    Are you sick today?   No   Do you have allergies to medications, food, a vaccine component or latex?   No   Have you ever had a serious reaction after receiving a vaccination?   No   Do you have a long-term health problem with heart disease, lung disease, asthma, kidney disease, metabolic disease (e.g. diabetes), anemia, or other blood disorder?   No   Do you have cancer, leukemia, HIV/AIDS, or any other immune system problem?   No   In the past 3 months, have you taken medications that affect  your immune system, such as prednisone, other steroids, or anticancer drugs; drugs for the treatment of rheumatoid arthritis, Crohn s disease, or psoriasis; or have you had radiation treatments?   No   Have you had a seizure, or a brain or other nervous system problem?   No   During the past year, have you received a transfusion of blood or blood     products, or been given immune (gamma) globulin or antiviral drug?   No   For women: Are you pregnant or is there a chance you could become        pregnant during the next month?   No   Have you received any vaccinations in the past 4 weeks?   No     Immunization questionnaire answers were all negative.        Per orders of Dr. Wegener, injection of Shingrix, Influenza given by Chari Aguayo. Patient instructed to remain in clinic for 15 minutes afterwards, and to report any adverse reaction to me immediately.       Screening performed by Chari Aguayo on 10/23/2018 at 8:29 AM.

## 2018-10-23 NOTE — MR AVS SNAPSHOT
"              After Visit Summary   10/23/2018    Marvin Elaine    MRN: [id number]           Patient Information     Date Of Birth          1955        Visit Information        Provider Department      10/23/2018 7:40 AM Wegener, Joel Daniel Irwin, MD [facility]        Today's Diagnoses     Essential tremor    -  1      Care Instructions    ASSESSMENT AND PLAN  1. Essential tremor  Trial propranolol .  Discussed possible side effects of wheezing and dizziness.     Flu shot and shingrix today.     Follow up as needed and then in 3-6 months for preventive physical.       - propranolol (INDERAL LA) 60 MG 24 hr capsule; Take 1 capsule (60 mg) by mouth daily  Dispense: 90 capsule; Refill: 3        MYCHART FOR ON-LINE CARE(VISITS), LABS, REFILLS, MESSAGING, ETC [email or web address] , [phone]    E-VISIT: click \"on-line care, then request e-visit\".  E-visits work well for following up on issues we have discussed in clinic previously which may need new prescriptions, new prescriptions or substantial discussion. These are always done by me (Dr. Wegener).     ONCARE VISIT:  [email or web address]  - we treat nearly 50 common conditions through on-care.  These are done in an hour by on-call staff.     RADIOLOGY:  [facility]:  [phone]   [facility]: [phone]    Mammogram and Colonoscopy [phone]    Smoking Cessation: www.quitplan.org, 9-302-563-PLAN (5300)      CONSUMER PRICE LINE for estimates of test costs:  [phone]               Follow-ups after your visit        Who to contact     If you have questions or need follow up information about today's clinic visit or your schedule please contact [facility] directly at [phone].  Normal or non-critical lab and imaging results will be communicated to you by MyChart, letter or phone within 4 business days after the clinic has received the results. If you do not hear from us within 7 " "days, please contact the clinic through Axela or phone. If you have a critical or abnormal lab result, we will notify you by phone as soon as possible.  Submit refill requests through Axela or call your pharmacy and they will forward the refill request to us. Please allow 3 business days for your refill to be completed.          Additional Information About Your Visit        LibertadCardharDeliRadio Information     Axela gives you secure access to your electronic health record. If you see a primary care provider, you can also send messages to your care team and make appointments. If you have questions, please call your primary care clinic.  If you do not have a primary care provider, please call [phone] and they will assist you.        Care EveryWhere ID     This is your Care EveryWhere ID. This could be used by other organizations to access your [place] medical records  [id number]        Your Vitals Were     Pulse Temperature Respirations Height Pulse Oximetry BMI (Body Mass Index)    75 97.6  F (36.4  C) (Oral) 18 5' 8\" (1.727 m) 100% 19.31 kg/m2       Blood Pressure from Last 3 Encounters:   10/23/18 134/85   10/13/17 135/87   10/13/17 155/87    Weight from Last 3 Encounters:   10/23/18 127 lb (57.6 kg)   09/08/17 128 lb (58.1 kg)   11/22/16 122 lb (55.3 kg)              Today, you had the following     No orders found for display         Today's Medication Changes          These changes are accurate as of 10/23/18  8:07 AM.  If you have any questions, ask your nurse or doctor.               Start taking these medicines.        Dose/Directions    propranolol 60 MG 24 hr capsule   Commonly known as:  INDERAL LA   Used for:  Essential tremor   Started by:  Wegener, Joel Daniel Irwin, MD        Dose:  60 mg   Take 1 capsule (60 mg) by mouth daily   Quantity:  90 capsule   Refills:  3            Where to get your medicines      These medications were sent to [facility] [address]" AT [address]  [address] [facility] [address]     Phone:  [phone]     propranolol 60 MG 24 hr capsule                Primary Care Provider Office Phone # Fax #    Joel Daniel Irwin Wegener, [phone] [address]  [facility] [address]        Equal Access to Services     [facility] CenterPATY : Hadii aad ku hadasho Soomaali, waaxda luqadaha, qaybta kaalmada adeegyada, waxay idiin hayaan adeeg kharash la'aan . So [facility] [phone].    ATENCIÓN: Si habla español, tiene a pace disposición servicios gratuitos de asistencia lingüística. [facility] [phone].    We comply with applicable federal civil rights laws and [place] laws. We do not discriminate on the basis of race, color, national origin, age, disability, sex, sexual orientation, or gender identity.            Thank you!     Thank you for choosing [facility]  for your care. Our goal is always to provide you with excellent care. Hearing back from our patients is one way we can continue to improve our services. Please take a few minutes to complete the written survey that you may receive in the mail after your visit with us. Thank you!             Your Updated Medication List - Protect others around you: Learn how to safely use, store and throw away your medicines at www.disposemymeds.org.          This list is accurate as of 10/23/18  8:07 AM.  Always use your most recent med list.                   Brand Name Dispense Instructions for use Diagnosis    ALPRAZOLAM PO      Take 0.25 mg by mouth 2 times daily        ASPIRIN PO      Take 81 mg by mouth daily        ibuprofen 200 MG tablet    ADVIL/MOTRIN    60 tablet    Take 2 tablets (400 mg) by mouth every 8 hours as needed for pain        latanoprost 0.005 % ophthalmic solution    XALATAN     Place 1 drop into both eyes daily        Multi-vitamin Tabs tablet      Take 1 tablet by mouth daily        ondansetron 4 MG ODT tab    ZOFRAN ODT    10 tablet    Take 1-2  "tablets (4-8 mg) by mouth every 8 hours as needed for nausea    Nausea       oxyCODONE-acetaminophen 5-325 MG per tablet    PERCOCET    15 tablet    Take 1-2 tablets by mouth every 4 hours as needed for pain        PEG-KCl-NaCl-NaSulf-Na Asc-C 100 g Solr    MOVIPREP    1 each    Take 1 each by mouth See Admin Instructions Refer to \"Getting Ready for a Colonoscopy\" patient handout    Encounter for screening colonoscopy       propranolol 60 MG 24 hr capsule    INDERAL LA    90 capsule    Take 1 capsule (60 mg) by mouth daily    Essential tremor       REXULTI 0.5 MG tablet   Generic drug:  brexpiprazole      TK 1 T PO QD        Simethicone 125 MG Caps     1 capsule    Take 125 mg by mouth See Admin Instructions Refer to \"Getting Ready for a Colonoscopy\" patient handout    Encounter for screening colonoscopy       VIIBRYD 40 MG Tabs tablet   Generic drug:  vilazodone      60 mg        VITAMIN D (CHOLECALCIFEROL) PO      Take 1,000 Units by mouth 2 times daily Vitamin D        WELLBUTRIN 100 MG tablet   Generic drug:  buPROPion     90 tablet    Take 150 mg by mouth 3 times daily          "

## 2018-10-23 NOTE — PATIENT INSTRUCTIONS
"ASSESSMENT AND PLAN  1. Essential tremor  Trial propranolol .  Discussed possible side effects of wheezing and dizziness.     Flu shot and shingrix today.     Follow up as needed and then in 3-6 months for preventive physical.     Check thyroid for hyperthyroid today as well.     - propranolol (INDERAL LA) 60 MG 24 hr capsule; Take 1 capsule (60 mg) by mouth daily  Dispense: 90 capsule; Refill: 3        MYCHART FOR ON-LINE CARE(VISITS), LABS, REFILLS, MESSAGING, ETC [email or web address] , [phone]    E-VISIT: click \"on-line care, then request e-visit\".  E-visits work well for following up on issues we have discussed in clinic previously which may need new prescriptions, new prescriptions or substantial discussion. These are always done by me (Dr. Wegener).     ONCARE VISIT:  [email or web address]  - we treat nearly 50 common conditions through on-care.  These are done in an hour by on-call staff.     RADIOLOGY:  [facility]:  [phone]   [facility]: [phone]    Mammogram and Colonoscopy [phone]    Smoking Cessation: www.quitplan.org, 2-711-946-PLAN (6974)      CONSUMER PRICE LINE for estimates of test costs:  [phone]       "

## 2018-10-23 NOTE — PROGRESS NOTES
SUBJECTIVE:   Marvin Elaine is a 63 year old male who presents to clinic today for the following health issues:    Concern - Tremor in right hand   Onset: year and half     Description:   Shakiness     Intensity: mild    Progression of Symptoms:  same    Accompanying Signs & Symptoms:      Previous history of similar problem:   none    Precipitating factors:   Worsened by: writing     Alleviating factors:  Improved by: none         Essential tremor: mainly right hand, sometimes left.  No stiffness, not at rest.  Starting to affect writing.   Need for prophylactic vaccination and inoculation against influenza  Need for shingles vaccine :        Problem list, Medication list, Allergies, and Medical/Social/Surgical histories reviewed in [facility] and updated as appropriate.  Labs reviewed in EPIC  BP Readings from Last 3 Encounters:   10/23/18 134/85   10/13/17 135/87   10/13/17 155/87    Wt Readings from Last 3 Encounters:   10/23/18 127 lb (57.6 kg)   09/08/17 128 lb (58.1 kg)   11/22/16 122 lb (55.3 kg)                  Patient Active Problem List   Diagnosis     Anxiety     Major depression     CARDIOVASCULAR SCREENING; LDL GOAL LESS THAN 160     Chronic pelvic pain in male     Chronic pain of right knee     Osteochondritis     Chronic midline low back pain without sciatica     Chronic bilateral low back pain without sciatica     Chronic right shoulder pain     Past Surgical History:   Procedure Laterality Date     LAPAROSCOPIC APPENDECTOMY  1/21/2012    Procedure:LAPAROSCOPIC APPENDECTOMY; Open Appendectomy; Surgeon:GERDA GASTON; Location: OR       Social History   Substance Use Topics     Smoking status: Never Smoker     Smokeless tobacco: Never Used     Alcohol use No     Family History   Problem Relation Age of Onset     Respiratory Mother      copd     Psychotic Disorder Mother      C.A.D. Father      Psychotic Disorder Father      depression, anxiety, alcohol abuse     Prostate Cancer No  "family hx of      Cancer - colorectal No family hx of      Diabetes No family hx of      Skin Cancer No family hx of          Current Outpatient Prescriptions   Medication Sig Dispense Refill     ASPIRIN PO Take 81 mg by mouth daily       buPROPion (WELLBUTRIN) 100 MG tablet Take 150 mg by mouth 3 times daily  90 tablet      ibuprofen (ADVIL/MOTRIN) 200 MG tablet Take 2 tablets (400 mg) by mouth every 8 hours as needed for pain 60 tablet 0     latanoprost (XALATAN) 0.005 % ophthalmic solution Place 1 drop into both eyes daily   0     multivitamin, therapeutic with minerals (MULTI-VITAMIN) TABS Take 1 tablet by mouth daily       propranolol (INDERAL LA) 60 MG 24 hr capsule Take 1 capsule (60 mg) by mouth daily 90 capsule 3     VITAMIN D, CHOLECALCIFEROL, PO Take 1,000 Units by mouth 2 times daily Vitamin D        ALPRAZOLAM PO Take 0.25 mg by mouth 2 times daily        ondansetron (ZOFRAN ODT) 4 MG ODT tab Take 1-2 tablets (4-8 mg) by mouth every 8 hours as needed for nausea (Patient not taking: Reported on 9/8/2017) 10 tablet 0     oxyCODONE-acetaminophen (PERCOCET) 5-325 MG per tablet Take 1-2 tablets by mouth every 4 hours as needed for pain (Patient not taking: Reported on 10/23/2018) 15 tablet 0     PEG-KCl-NaCl-NaSulf-Na Asc-C (MOVIPREP) 100 G SOLR Take 1 each by mouth See Admin Instructions Refer to \"Getting Ready for a Colonoscopy\" patient handout (Patient not taking: Reported on 9/8/2017) 1 each 0     REXULTI 0.5 MG tablet TK 1 T PO QD  5     Simethicone 125 MG CAPS Take 125 mg by mouth See Admin Instructions Refer to \"Getting Ready for a Colonoscopy\" patient handout (Patient not taking: Reported on 9/8/2017) 1 capsule 0     vilazodone (VIIBRYD) 40 MG TABS tablet 60 mg        No Known Allergies  Recent Labs   Lab Test  10/13/17   1245  02/09/16   0923  04/28/14   1042  01/20/12   2345   LDL   --   94  118   --    HDL   --   85  72   --    TRIG   --   58  90   --    ALT  19   --   31  27   CR  1.35*   --   " "1.08  1.10   GFRESTIMATED  53*   --   70  69   GFRESTBLACK  64   --   84  83   POTASSIUM  4.1   --   4.2  3.9        ROS:  Constitutional, HEENT, cardiovascular, pulmonary, GI, , musculoskeletal, neuro, skin, endocrine and psych systems are negative, except as otherwise noted.        OBJECTIVE:  /85 (BP Location: Right arm, Patient Position: Sitting, Cuff Size: Adult Regular)  Pulse 75  Temp 97.6  F (36.4  C) (Oral)  Resp 18  Ht 5' 8\" (1.727 m)  Wt 127 lb (57.6 kg)  SpO2 100%  BMI 19.31 kg/m2    EXAM:  GENERAL APPEARANCE: healthy, alert and no distress  Fine tremor bilateral hands right more than left with arms extended.    No resting tremor, no pill rolling.     ASSESSMENT AND PLAN  Patient Instructions   ASSESSMENT AND PLAN  1. Essential tremor  Trial propranolol .  Discussed possible side effects of wheezing and dizziness.     Flu shot and shingrix today.     Follow up as needed and then in 3-6 months for preventive physical.     Check thyroid for hyperthyroid today as well.     - propranolol (INDERAL LA) 60 MG 24 hr capsule; Take 1 capsule (60 mg) by mouth daily  Dispense: 90 capsule; Refill: 3        MYCHART FOR ON-LINE CARE(VISITS), LABS, REFILLS, MESSAGING, ETC [email or web address] , [phone]    E-VISIT: click \"on-line care, then request e-visit\".  E-visits work well for following up on issues we have discussed in clinic previously which may need new prescriptions, new prescriptions or substantial discussion. These are always done by me (Dr. Wegener).     ONCARE VISIT:  [email or web address]  - we treat nearly 50 common conditions through on-care.  These are done in an hour by on-call staff.     RADIOLOGY:  [facility]:  [phone]   [facility]: [phone]    Mammogram and Colonoscopy [phone]    Smoking Cessation: www.quitplan.org, 2-882-326-PLAN (7452)      CONSUMER PRICE LINE for estimates of test costs:  [phone]             Man" Wegener,MD          Injectable Influenza Immunization Documentation    1.  Is the person to be vaccinated sick today?   No    2. Does the person to be vaccinated have an allergy to a component   of the vaccine?   No  Egg Allergy Algorithm Link    3. Has the person to be vaccinated ever had a serious reaction   to influenza vaccine in the past?   No    4. Has the person to be vaccinated ever had Guillain-Barré syndrome?   No    Form completed by Patient.      .Chari Aguayo MA

## 2018-12-20 PROBLEM — Z00.01 ENCOUNTER FOR GENERAL ADULT MEDICAL EXAMINATION WITH ABNORMAL FINDINGS: Status: ACTIVE | Noted: 2018-12-20

## 2018-12-21 ENCOUNTER — OFFICE VISIT (OUTPATIENT)
Dept: FAMILY MEDICINE | Facility: CLINIC | Age: 63
End: 2018-12-21
Payer: COMMERCIAL

## 2018-12-21 VITALS
DIASTOLIC BLOOD PRESSURE: 79 MMHG | HEART RATE: 73 BPM | HEIGHT: 68 IN | RESPIRATION RATE: 18 BRPM | SYSTOLIC BLOOD PRESSURE: 131 MMHG | WEIGHT: 125 LBS | BODY MASS INDEX: 18.94 KG/M2 | TEMPERATURE: 97.6 F | OXYGEN SATURATION: 100 %

## 2018-12-21 DIAGNOSIS — F41.9 ANXIETY: ICD-10-CM

## 2018-12-21 DIAGNOSIS — E55.9 HYPOVITAMINOSIS D: ICD-10-CM

## 2018-12-21 DIAGNOSIS — Z00.01 ENCOUNTER FOR GENERAL ADULT MEDICAL EXAMINATION WITH ABNORMAL FINDINGS: Primary | ICD-10-CM

## 2018-12-21 DIAGNOSIS — Z13.6 CARDIOVASCULAR SCREENING; LDL GOAL LESS THAN 160: ICD-10-CM

## 2018-12-21 PROCEDURE — 80053 COMPREHEN METABOLIC PANEL: CPT | Performed by: FAMILY MEDICINE

## 2018-12-21 PROCEDURE — 80061 LIPID PANEL: CPT | Performed by: FAMILY MEDICINE

## 2018-12-21 PROCEDURE — 99397 PER PM REEVAL EST PAT 65+ YR: CPT | Performed by: FAMILY MEDICINE

## 2018-12-21 PROCEDURE — 82306 VITAMIN D 25 HYDROXY: CPT | Performed by: FAMILY MEDICINE

## 2018-12-21 PROCEDURE — 36415 COLL VENOUS BLD VENIPUNCTURE: CPT | Performed by: FAMILY MEDICINE

## 2018-12-21 ASSESSMENT — ENCOUNTER SYMPTOMS
SORE THROAT: 0
ABDOMINAL PAIN: 0
FREQUENCY: 0
SHORTNESS OF BREATH: 0
HEMATURIA: 0
NAUSEA: 0
PARESTHESIAS: 0
EYE PAIN: 0
NERVOUS/ANXIOUS: 1
HEARTBURN: 0
MYALGIAS: 0
FEVER: 0
CONSTIPATION: 0
JOINT SWELLING: 0
HEADACHES: 0
HEMATOCHEZIA: 0
WEAKNESS: 0
DIARRHEA: 0
COUGH: 0
ARTHRALGIAS: 0
DYSURIA: 0
DIZZINESS: 0
PALPITATIONS: 0

## 2018-12-21 ASSESSMENT — MIFFLIN-ST. JEOR: SCORE: 1326.5

## 2018-12-21 ASSESSMENT — ACTIVITIES OF DAILY LIVING (ADL): CURRENT_FUNCTION: NO ASSISTANCE NEEDED

## 2018-12-21 NOTE — PROGRESS NOTES
"SUBJECTIVE:   Marvin Elaine is a 63 year old male who presents for Preventive Visit.  Are you in the first 12 months of your Medicare coverage?  No    Annual Wellness Visit     In general, how would you rate your overall health?  Excellent    Frequency of exercise:  2-3 days/week    Do you usually eat at least 4 servings of fruit and vegetables a day, include whole grains    & fiber and avoid regularly eating high fat or \"junk\" foods?  No    Taking medications regularly:  Yes    Ability to successfully perform activities of daily living:  No assistance needed    Home Safety:  No safety concerns identified    Hearing Impairment:  Difficulty following a conversation in a noisy restaurant or crowded room, need to ask people to speak up or repeat themselves and difficulty understanding soft or whispered speech    In the past 6 months, have you been bothered by leaking of urine?  No    In general, how would you rate your overall mental or emotional health?  Good    PHQ-2 Total Score: 0    Additional concerns today:  No    Do you feel safe in your environment? Yes    Do you have a Health Care Directive? No: Advance care planning reviewed with patient; information given to patient to review.      Fall risk     click delete button to remove this line now  Cognitive Screening   1) Repeat 3 items (Leader, Season, Table)    2) Clock draw: NORMAL  3) 3 item recall: Recalls 3 objects  Results: 3 items recalled: COGNITIVE IMPAIRMENT LESS LIKELY    Mini-CogTM Copyright AVILA Mcknight. Licensed by the author for use in [facility]; reprinted with permission ([email or web address].[email or web address]). All rights reserved.      Do you have sleep apnea, excessive snoring or daytime drowsiness?: no    Reviewed and updated as needed this visit by clinical staff         Reviewed and updated as needed this visit by Provider        Social History     Tobacco Use     Smoking status: Never Smoker     Smokeless tobacco: Never Used   Substance Use Topics     " Alcohol use: No       Alcohol Use 12/21/2018   If you drink alcohol do you typically have greater than 3 drinks per day OR greater than 7 drinks per week? Not Applicable           PROBLEMS TO ADD ON...  -------------------------------------      Current providers sharing in care for this patient include:   Patient Care Team:  Wegener, Joel Daniel Irwin, MD as PCP - General (Family Practice)  Wegener, Joel Daniel Irwin, MD as PCP - Assigned PCP  ABHAY Soto MD as MD (Dermatology)    The following health maintenance items are reviewed in Epic and correct as of today:  Health Maintenance   Topic Date Due     URINE DRUG SCREEN Q1 YR  09/18/1968     EMBER QUESTIONNAIRE 1 YEAR  09/18/1971     HIV SCREEN (SYSTEM ASSIGNED)  09/18/1971     ADVANCE DIRECTIVE PLANNING Q5 YRS  09/18/2008     PHQ-9 Q1YR  02/09/2017     FALL RISK ASSESSMENT  09/18/2018     ZOSTER IMMUNIZATION (3 of 3) 12/18/2018     LIPID SCREEN Q5 YR MALE (SYSTEM ASSIGNED)  02/09/2021     COLONOSCOPY Q5 YR  05/10/2022     DTAP/TDAP/TD IMMUNIZATION (2 - Td) 04/28/2024     INFLUENZA VACCINE  Completed     AORTIC ANEURYSM SCREENING (SYSTEM ASSIGNED)  Completed     HEPATITIS C SCREENING  Completed     IPV IMMUNIZATION  Aged Out     MENINGITIS IMMUNIZATION  Aged Out     Labs reviewed in EPIC      Review of Systems   Constitutional: Negative for fever.   HENT: Positive for hearing loss. Negative for congestion, ear pain and sore throat.    Eyes: Negative for pain and visual disturbance.   Respiratory: Negative for cough and shortness of breath.    Cardiovascular: Negative for chest pain, palpitations and peripheral edema.   Gastrointestinal: Negative for abdominal pain, constipation, diarrhea, heartburn, hematochezia and nausea.   Genitourinary: Positive for impotence. Negative for discharge, dysuria, frequency, genital sores, hematuria and urgency.   Musculoskeletal: Negative for arthralgias, joint swelling and myalgias.   Skin: Negative for rash.  "  Neurological: Negative for dizziness, weakness, headaches and paresthesias.   Psychiatric/Behavioral: Positive for mood changes. The patient is nervous/anxious.          OBJECTIVE:   There were no vitals taken for this visit. Estimated body mass index is 19.31 kg/m  as calculated from the following:    Height as of 10/23/18: 1.727 m (5' 8\").    Weight as of 10/23/18: 57.6 kg (127 lb).  Physical Exam  GENERAL: healthy, alert and no distress  EYES: Eyes grossly normal to inspection, PERRL and conjunctivae and sclerae normal  HENT: ear canals and TM's normal, nose and mouth without ulcers or lesions  NECK: no adenopathy, no asymmetry, masses, or scars and thyroid normal to palpation  RESP: lungs clear to auscultation - no rales, rhonchi or wheezes  CV: regular rate and rhythm, normal S1 S2, no S3 or S4, no murmur, click or rub, no peripheral edema and peripheral pulses strong  ABDOMEN: soft, nontender, no hepatosplenomegaly, no masses and bowel sounds normal   (male): normal male genitalia without lesions or urethral discharge, no hernia  RECTAL: normal sphincter tone, no rectal masses, prostate normal size, smooth, nontender without nodules or masses  MS: no gross musculoskeletal defects noted, no edema  SKIN: no suspicious lesions or rashes  NEURO: Normal strength and tone, mentation intact and speech normal  PSYCH: mentation appears normal, affect normal/bright    Diagnostic Test Results:  none     ASSESSMENT / PLAN:   ASSESSMENT AND PLAN  1. Encounter for general adult medical examination with abnormal findings  Up to date on all vaccines.      Due for shingrix #2 in about a week.     Up to date on colon cancer screening and did psa two years ago.     2. CARDIOVASCULAR SCREENING; LDL GOAL LESS THAN 160    - Comprehensive metabolic panel  - Lipid panel reflex to direct LDL Fasting    3. Anxiety  Working with his psychiatrist.       4. Hypovitaminosis D    - Vitamin D Deficiency    End of Life Planning:  Patient " "currently has an advanced directive: No.  I have verified the patient's ablity to prepare an advanced directive/make health care decisions.  Literature was provided to assist patient in preparing an advanced directive.    COUNSELING:  Reviewed preventive health counseling, as reflected in patient instructions       Regular exercise       Healthy diet/nutrition       Colon cancer screening       Prostate cancer screening    BP Readings from Last 1 Encounters:   10/23/18 134/85     Estimated body mass index is 19.31 kg/m  as calculated from the following:    Height as of 10/23/18: 1.727 m (5' 8\").    Weight as of 10/23/18: 57.6 kg (127 lb).    BP Screening:   Last 3 BP Readings:    BP Readings from Last 3 Encounters:   01/16/19 138/87   12/21/18 131/79   10/23/18 134/85       The following was recommended to the patient:  Re-screen BP within a year and recommended lifestyle modifications       reports that  has never smoked. he has never used smokeless tobacco.      Appropriate preventive services were discussed with this patient, including applicable screening as appropriate for cardiovascular disease, diabetes, osteopenia/osteoporosis, and glaucoma.  As appropriate for age/gender, discussed screening for colorectal cancer, prostate cancer, breast cancer, and cervical cancer. Checklist reviewing preventive services available has been given to the patient.    Reviewed patients plan of care and provided an AVS. The Basic Care Plan (routine screening as documented in Health Maintenance) for Marvin meets the Care Plan requirement. This Care Plan has been established and reviewed with the Patient.    Counseling Resources:  ATP IV Guidelines  Pooled Cohorts Equation Calculator  Breast Cancer Risk Calculator  FRAX Risk Assessment  ICSI Preventive Guidelines  Dietary Guidelines for Americans, 2010  USDA's MyPlate  ASA Prophylaxis  Lung CA Screening    Joel Daniel Wegener, MD  [facility]  "

## 2018-12-21 NOTE — PATIENT INSTRUCTIONS
"ASSESSMENT AND PLAN  1. Encounter for general adult medical examination with abnormal findings  Up to date on all vaccines.      Due for shingrix #2 in about a week.     Up to date on colon cancer screening and did psa two years ago.     2. CARDIOVASCULAR SCREENING; LDL GOAL LESS THAN 160    - Comprehensive metabolic panel  - Lipid panel reflex to direct LDL Fasting    3. Anxiety  Working with his psychiatrist.       4. Hypovitaminosis D    - Vitamin D Deficiency        MYCHART FOR ON-LINE CARE(VISITS), LABS, REFILLS, MESSAGING, ETC [email or web address] 1-634.180.1987    E-VISIT: click \"on-line care, then request e-visit\".  E-visits work well for following up on issues we have discussed in clinic previously which may need new prescriptions, new prescriptions or substantial discussion. These are always done by me (Dr. Wegener).     ONCARE VISIT:  [email or web address]  - we treat nearly 50 common conditions through on-care.  These are done in an hour by on-call staff.     RADIOLOGY:  [facility]:  [phone]   [facility]: [phone]    Mammogram and Colonoscopy [phone]    Smoking Cessation: www.quitplan.org, 5-000-050-PLAN (4642)      CONSUMER PRICE LINE for estimates of test costs:  [phone]       Preventive Health Recommendations:     See your health care provider every year to    Review health changes.     Discuss preventive care.      Review your medicines if your doctor has prescribed any.    Talk with your health care provider about whether you should have a test to screen for prostate cancer (PSA).    Every 3 years, have a diabetes test (fasting glucose). If you are at risk for diabetes, you should have this test more often.    Every 5 years, have a cholesterol test. Have this test more often if you are at risk for high cholesterol or heart disease.     Every 10 years, have a colonoscopy. Or, have a yearly FIT test (stool test). These exams will check for colon " cancer.    Talk to with your health care provider about screening for Abdominal Aortic Aneurysm if you have a family history of AAA or have a history of smoking.  Shots:     Get a flu shot each year.     Get a tetanus shot every 10 years.     Talk to your doctor about your pneumonia vaccines. There are now two you should receive - Pneumovax (PPSV 23) and Prevnar (PCV 13).    Talk to your pharmacist about a shingles vaccine.     Talk to your doctor about the hepatitis B vaccine.  Nutrition:     Eat at least 5 servings of fruits and vegetables each day.     Eat whole-grain bread, whole-wheat pasta and brown rice instead of white grains and rice.     Get adequate Calcium and Vitamin D.   Lifestyle    Exercise for at least 150 minutes a week (30 minutes a day, 5 days a week). This will help you control your weight and prevent disease.     Limit alcohol to one drink per day.     No smoking.     Wear sunscreen to prevent skin cancer.     See your dentist every six months for an exam and cleaning.     See your eye doctor every 1 to 2 years to screen for conditions such as glaucoma, macular degeneration and cataracts.    Personalized Prevention Plan  You are due for the preventive services outlined below.  Your care team is available to assist you in scheduling these services.  If you have already completed any of these items, please share that information with your care team to update in your medical record.    Health Maintenance Due   Topic Date Due     URINE DRUG SCREEN Q1 YR  09/18/1968     EMBER QUESTIONNAIRE 1 YEAR  09/18/1971     HIV SCREEN (SYSTEM ASSIGNED)  09/18/1971     Discuss Advance Directive Planning  09/18/2008     Depression Assessment - yearly  02/09/2017     FALL RISK ASSESSMENT  09/18/2018     Zoster (Chicken Pox) Vaccine (3 of 3) 12/18/2018

## 2018-12-22 LAB
ALBUMIN SERPL-MCNC: 4 G/DL (ref 3.4–5)
ALP SERPL-CCNC: 105 U/L (ref 40–150)
ALT SERPL W P-5'-P-CCNC: 20 U/L (ref 0–70)
ANION GAP SERPL CALCULATED.3IONS-SCNC: 8 MMOL/L (ref 3–14)
AST SERPL W P-5'-P-CCNC: 16 U/L (ref 0–45)
BILIRUB SERPL-MCNC: 0.4 MG/DL (ref 0.2–1.3)
BUN SERPL-MCNC: 21 MG/DL (ref 7–30)
CALCIUM SERPL-MCNC: 9.4 MG/DL (ref 8.5–10.1)
CHLORIDE SERPL-SCNC: 103 MMOL/L (ref 94–109)
CHOLEST SERPL-MCNC: 201 MG/DL
CO2 SERPL-SCNC: 28 MMOL/L (ref 20–32)
CREAT SERPL-MCNC: 1.15 MG/DL (ref 0.66–1.25)
GFR SERPL CREATININE-BSD FRML MDRD: 66 ML/MIN/{1.73_M2}
GLUCOSE SERPL-MCNC: 81 MG/DL (ref 70–99)
HDLC SERPL-MCNC: 75 MG/DL
LDLC SERPL CALC-MCNC: 118 MG/DL
NONHDLC SERPL-MCNC: 126 MG/DL
POTASSIUM SERPL-SCNC: 4.2 MMOL/L (ref 3.4–5.3)
PROT SERPL-MCNC: 6.9 G/DL (ref 6.8–8.8)
SODIUM SERPL-SCNC: 139 MMOL/L (ref 133–144)
TRIGL SERPL-MCNC: 40 MG/DL

## 2018-12-24 LAB — DEPRECATED CALCIDIOL+CALCIFEROL SERPL-MC: 61 UG/L (ref 20–75)

## 2019-01-16 ENCOUNTER — OFFICE VISIT (OUTPATIENT)
Dept: FAMILY MEDICINE | Facility: CLINIC | Age: 64
End: 2019-01-16
Payer: COMMERCIAL

## 2019-01-16 VITALS
DIASTOLIC BLOOD PRESSURE: 87 MMHG | WEIGHT: 125 LBS | HEIGHT: 68 IN | TEMPERATURE: 97.9 F | SYSTOLIC BLOOD PRESSURE: 138 MMHG | BODY MASS INDEX: 18.94 KG/M2 | RESPIRATION RATE: 18 BRPM | HEART RATE: 75 BPM | OXYGEN SATURATION: 100 %

## 2019-01-16 DIAGNOSIS — E78.5 HYPERLIPIDEMIA LDL GOAL <160: Primary | ICD-10-CM

## 2019-01-16 DIAGNOSIS — D64.9 ANEMIA, UNSPECIFIED TYPE: ICD-10-CM

## 2019-01-16 LAB
BASOPHILS # BLD AUTO: 0.1 10E9/L (ref 0–0.2)
BASOPHILS NFR BLD AUTO: 0.9 %
DIFFERENTIAL METHOD BLD: NORMAL
EOSINOPHIL # BLD AUTO: 0.3 10E9/L (ref 0–0.7)
EOSINOPHIL NFR BLD AUTO: 3.7 %
ERYTHROCYTE [DISTWIDTH] IN BLOOD BY AUTOMATED COUNT: 12.6 % (ref 10–15)
FOLATE SERPL-MCNC: 15.7 NG/ML
HCT VFR BLD AUTO: 43 % (ref 40–53)
HGB BLD-MCNC: 14.4 G/DL (ref 13.3–17.7)
LYMPHOCYTES # BLD AUTO: 1.9 10E9/L (ref 0.8–5.3)
LYMPHOCYTES NFR BLD AUTO: 27.8 %
MCH RBC QN AUTO: 31.9 PG (ref 26.5–33)
MCHC RBC AUTO-ENTMCNC: 33.5 G/DL (ref 31.5–36.5)
MCV RBC AUTO: 95 FL (ref 78–100)
MONOCYTES # BLD AUTO: 0.7 10E9/L (ref 0–1.3)
MONOCYTES NFR BLD AUTO: 9.6 %
NEUTROPHILS # BLD AUTO: 4.1 10E9/L (ref 1.6–8.3)
NEUTROPHILS NFR BLD AUTO: 58 %
PLATELET # BLD AUTO: 223 10E9/L (ref 150–450)
RBC # BLD AUTO: 4.52 10E12/L (ref 4.4–5.9)
TRANSFERRIN SERPL-MCNC: 287 MG/DL (ref 210–360)
VIT B12 SERPL-MCNC: 293 PG/ML (ref 193–986)
WBC # BLD AUTO: 7 10E9/L (ref 4–11)

## 2019-01-16 PROCEDURE — 84466 ASSAY OF TRANSFERRIN: CPT | Performed by: FAMILY MEDICINE

## 2019-01-16 PROCEDURE — 85025 COMPLETE CBC W/AUTO DIFF WBC: CPT | Performed by: FAMILY MEDICINE

## 2019-01-16 PROCEDURE — 82728 ASSAY OF FERRITIN: CPT | Performed by: FAMILY MEDICINE

## 2019-01-16 PROCEDURE — 82746 ASSAY OF FOLIC ACID SERUM: CPT | Performed by: FAMILY MEDICINE

## 2019-01-16 PROCEDURE — 82607 VITAMIN B-12: CPT | Performed by: FAMILY MEDICINE

## 2019-01-16 PROCEDURE — 99213 OFFICE O/P EST LOW 20 MIN: CPT | Performed by: FAMILY MEDICINE

## 2019-01-16 PROCEDURE — 36415 COLL VENOUS BLD VENIPUNCTURE: CPT | Performed by: FAMILY MEDICINE

## 2019-01-16 ASSESSMENT — MIFFLIN-ST. JEOR: SCORE: 1326.5

## 2019-01-16 NOTE — PATIENT INSTRUCTIONS
Discussed cholesterol.     Will do diagnostic anemia testing since low when giving blood to give guidance on supplements.     Follow up for FASTING physical one year.

## 2019-01-16 NOTE — PROGRESS NOTES
SUBJECTIVE:   Marvin Elaine is a 63 year old male who presents to clinic today for the following health issues:    Patient is here for follow up for lab result.          Hyperlipidemia LDL goal <160: follow up labs from physical.   Anemia, unspecified type :noted when giving blood and denied. Thinks hematocrit of about 39.  He suspects from previous poor nutrition due to depression now improved.         Problem list, Medication list, Allergies, and Medical/Social/Surgical histories reviewed in [facility] and updated as appropriate.  Labs reviewed in EPIC  BP Readings from Last 3 Encounters:   01/16/19 138/87   12/21/18 131/79   10/23/18 134/85    Wt Readings from Last 3 Encounters:   01/16/19 56.7 kg (125 lb)   12/21/18 56.7 kg (125 lb)   10/23/18 57.6 kg (127 lb)                  Patient Active Problem List   Diagnosis     Anxiety     Major depression     CARDIOVASCULAR SCREENING; LDL GOAL LESS THAN 160     Chronic pelvic pain in male     Chronic pain of right knee     Osteochondritis     Chronic midline low back pain without sciatica     Chronic bilateral low back pain without sciatica     Chronic right shoulder pain     Encounter for general adult medical examination with abnormal findings     Past Surgical History:   Procedure Laterality Date     LAPAROSCOPIC APPENDECTOMY  1/21/2012    Procedure:LAPAROSCOPIC APPENDECTOMY; Open Appendectomy; Surgeon:GERDA GASTON; Location: OR       Social History     Tobacco Use     Smoking status: Never Smoker     Smokeless tobacco: Never Used   Substance Use Topics     Alcohol use: No     Family History   Problem Relation Age of Onset     Respiratory Mother         copd     Psychotic Disorder Mother      C.A.D. Father      Psychotic Disorder Father         depression, anxiety, alcohol abuse     Prostate Cancer No family hx of      Cancer - colorectal No family hx of      Diabetes No family hx of      Skin Cancer No family hx of          Current Outpatient  "Medications   Medication Sig Dispense Refill     ALPRAZOLAM PO Take 0.25 mg by mouth 2 times daily        ASPIRIN PO Take 81 mg by mouth daily       buPROPion (WELLBUTRIN) 100 MG tablet Take 150 mg by mouth 3 times daily  90 tablet      ibuprofen (ADVIL/MOTRIN) 200 MG tablet Take 2 tablets (400 mg) by mouth every 8 hours as needed for pain 60 tablet 0     latanoprost (XALATAN) 0.005 % ophthalmic solution Place 1 drop into both eyes daily   0     multivitamin, therapeutic with minerals (MULTI-VITAMIN) TABS Take 1 tablet by mouth daily       propranolol (INDERAL LA) 60 MG 24 hr capsule Take 1 capsule (60 mg) by mouth daily 90 capsule 3     REXULTI 0.5 MG tablet TK 1 T PO QD  5     vilazodone (VIIBRYD) 40 MG TABS tablet 60 mg        VITAMIN D, CHOLECALCIFEROL, PO Take 1,000 Units by mouth 2 times daily Vitamin D        ondansetron (ZOFRAN ODT) 4 MG ODT tab Take 1-2 tablets (4-8 mg) by mouth every 8 hours as needed for nausea (Patient not taking: Reported on 9/8/2017) 10 tablet 0     No Known Allergies  Recent Labs   Lab Test 12/21/18  1610 10/23/18  0843 10/13/17  1245 02/09/16  0923 04/28/14  1042   *  --   --  94 118   HDL 75  --   --  85 72   TRIG 40  --   --  58 90   ALT 20  --  19  --  31   CR 1.15  --  1.35*  --  1.08   GFRESTIMATED 66  --  53*  --  70   GFRESTBLACK 77  --  64  --  84   POTASSIUM 4.2  --  4.1  --  4.2   TSH  --  1.12  --   --   --         ROS:  Constitutional, HEENT, cardiovascular, pulmonary, GI, , musculoskeletal, neuro, skin, endocrine and psych systems are negative, except as otherwise noted.        OBJECTIVE:  /87 (BP Location: Left arm, Patient Position: Sitting, Cuff Size: Adult Regular)   Pulse 75   Temp 97.9  F (36.6  C) (Oral)   Resp 18   Ht 1.727 m (5' 8\")   Wt 56.7 kg (125 lb)   SpO2 100%   BMI 19.01 kg/m      EXAM:  GENERAL APPEARANCE: healthy, alert and no distress      ASSESSMENT AND PLAN  Patient Instructions   Discussed cholesterol. Reviewed ascvd risk " calculator and potential change in risk with very tight cholesterol control.  Could potentially decrease risk from about 10.5% estimated 10 year risk now to 6% with excellent blood pressue control and statin medication.  He would like to monitor for now. Cholesterol test was also not fasting so likely overestimates his true risk.     Will do diagnostic anemia testing since low when giving blood to give guidance on supplements.     Follow up for FASTING physical one year.     Billing diagnoses;  ASSESSMENT AND PLAN  1. Hyperlipidemia LDL goal <160      2. Anemia, unspecified type    - CBC with platelets differential  - Ferritin  - Transferrin  - Vitamin B12  - Folate  Joel Wegener,MD

## 2019-01-17 LAB — FERRITIN SERPL-MCNC: 37 NG/ML (ref 26–388)

## 2019-06-06 NOTE — PROGRESS NOTES
Discharge Note    Progress reporting period is from last progress note on April 19, 2018  to May 31, 2018.    Marvin failed to follow up and current status is unknown.  Please see information below for last relevant information on current status.  Patient seen for 5 visits.    SUBJECTIVE  Subjective changes noted by patient:  R shdr and upper arm feel better. Has increased wt. w/ strength exercises. C/o mild pain with reaching out to side and overhead.  .  Current pain level is 0/10(at rest and 1/10 w/ reaching).     Previous pain level was  7/10.   Changes in function:  Yes (See Goal flowsheet attached for changes in current functional level)  Adverse reaction to treatment or activity: None    OBJECTIVE  Changes noted in objective findings: R shdr AROM: flex WNL -, abd WNL -/+, ER WNL -, IR/ext T6 -/+.     ASSESSMENT/PLAN  Diagnosis: R RC tendinosis   Updated problem list and treatment plan:   Pain - HEP  Decreased function - HEP  Decreased strength - HEP  Impaired muscle performance - HEP  Decreased proprioception - HEP  Impaired posture - HEP  STG/LTGs have been met or progress has been made towards goals:  Yes, please see goal flowsheet for most current information  Assessment of Progress: current status is unknown.    Last current status: Pt is progressing as expected   Self Management Plans:  HEP  I have re-evaluated this patient and find that the nature, scope, duration and intensity of the therapy is appropriate for the medical condition of the patient.  Marvin continues to require the following intervention to meet STG and LTG's:  HEP.    Recommendations:  Discharge with current home program.  Patient to follow up with MD as needed.    Please refer to the daily flowsheet for treatment today, total treatment time and time spent performing 1:1 timed codes.

## 2019-09-27 PROBLEM — G89.29 CHRONIC RIGHT SHOULDER PAIN: Status: RESOLVED | Noted: 2018-04-26 | Resolved: 2019-09-27

## 2019-09-27 PROBLEM — M25.511 CHRONIC RIGHT SHOULDER PAIN: Status: RESOLVED | Noted: 2018-04-26 | Resolved: 2019-09-27

## 2019-10-04 ENCOUNTER — HEALTH MAINTENANCE LETTER (OUTPATIENT)
Age: 64
End: 2019-10-04

## 2019-10-14 ENCOUNTER — ALLIED HEALTH/NURSE VISIT (OUTPATIENT)
Dept: NURSING | Facility: CLINIC | Age: 64
End: 2019-10-14
Payer: COMMERCIAL

## 2019-10-14 DIAGNOSIS — Z23 NEED FOR PROPHYLACTIC VACCINATION AND INOCULATION AGAINST INFLUENZA: Primary | ICD-10-CM

## 2019-10-14 PROCEDURE — 90471 IMMUNIZATION ADMIN: CPT

## 2019-10-14 PROCEDURE — 90662 IIV NO PRSV INCREASED AG IM: CPT

## 2019-12-13 ENCOUNTER — OFFICE VISIT (OUTPATIENT)
Dept: DERMATOLOGY | Facility: CLINIC | Age: 64
End: 2019-12-13
Payer: COMMERCIAL

## 2019-12-13 DIAGNOSIS — D22.9 MULTIPLE BENIGN NEVI: ICD-10-CM

## 2019-12-13 DIAGNOSIS — L85.3 XEROSIS CUTIS: ICD-10-CM

## 2019-12-13 DIAGNOSIS — L81.4 SOLAR LENTIGO: ICD-10-CM

## 2019-12-13 DIAGNOSIS — D18.01 CHERRY ANGIOMA: ICD-10-CM

## 2019-12-13 DIAGNOSIS — L57.0 ACTINIC KERATOSIS: ICD-10-CM

## 2019-12-13 DIAGNOSIS — L82.1 SEBORRHEIC KERATOSIS: ICD-10-CM

## 2019-12-13 DIAGNOSIS — L57.8 SUN-DAMAGED SKIN: Primary | ICD-10-CM

## 2019-12-13 RX ORDER — PAROXETINE 30 MG/1
TABLET, FILM COATED ORAL EVERY MORNING
COMMUNITY
End: 2022-12-30

## 2019-12-13 ASSESSMENT — PAIN SCALES - GENERAL: PAINLEVEL: NO PAIN (0)

## 2019-12-13 NOTE — LETTER
12/13/2019       RE: Marvin Elaine  [address]  [facility] [address]     Dear Colleague,    Thank you for referring your patient, Marvin Elaine, to the [facility] DERMATOLOGY at [facility]. Please see a copy of my visit note below.    [facility] Dermatology Note    Dermatology Problem List:  1. iSK  -s/p cryotherapy   2. Xerosis cutis  -current t/x: cream based moisturizer  3. AK  -s/p cryotherapy    Encounter Date: Dec 13, 2019    CC:  Chief Complaint   Patient presents with     Skin Check     Marvin is here today for a Skin Check. He has Lesions on concern Right Hip, Hand, and Ankle.      History of Present Illness:  Mr. Marvin Elaine is a 64 year old male who presents as a follow-up skin check. He was last seen on 3/1/17 when iSK were treated with cryotherapy. He has a family history of NMSC in his brother, no personal history of skin cancer. Today he has areas of concerns on his right thigh, hand, and ankle. These are somewhat crusty and brown in color, they do not bleed, itch, or become tender. Denies any other tender, nonhealing, bleeding skin lesions. Additionally he has been itching at his back and scalp a lot lately. He originally thought he had acne in these area but it is continually itchy. He does not moisturize his skin ever. No other concerns addressed today.    Past Medical History:   Patient Active Problem List   Diagnosis     Anxiety     Major depression     CARDIOVASCULAR SCREENING; LDL GOAL LESS THAN 160     Chronic pelvic pain in male     Chronic pain of right knee     Osteochondritis     Chronic midline low back pain without sciatica     Chronic bilateral low back pain without sciatica     Encounter for general adult medical examination with abnormal findings     Past Medical History:   Diagnosis Date     Anxiety      Depressive disorder      Prostate infection      Past Surgical History:   Procedure Laterality Date      LAPAROSCOPIC APPENDECTOMY  1/21/2012    Procedure:LAPAROSCOPIC APPENDECTOMY; Open Appendectomy; Surgeon:GERDA GASTON; Location:[facility]       Social History:  Patient reports that he has never smoked. He has never used smokeless tobacco. He reports that he does not drink alcohol or use drugs.    Family History:  History of NMSC in his brother (probable BCC)    Medications:  Current Outpatient Medications   Medication Sig Dispense Refill     ALPRAZOLAM PO Take 0.25 mg by mouth 2 times daily        latanoprost (XALATAN) 0.005 % ophthalmic solution Place 1 drop into both eyes daily   0     PARoxetine (PAXIL) 30 MG tablet Take by mouth every morning       ASPIRIN PO Take 81 mg by mouth daily       buPROPion (WELLBUTRIN) 100 MG tablet Take 150 mg by mouth 3 times daily  90 tablet      ibuprofen (ADVIL/MOTRIN) 200 MG tablet Take 2 tablets (400 mg) by mouth every 8 hours as needed for pain (Patient not taking: Reported on 12/13/2019) 60 tablet 0     multivitamin, therapeutic with minerals (MULTI-VITAMIN) TABS Take 1 tablet by mouth daily       ondansetron (ZOFRAN ODT) 4 MG ODT tab Take 1-2 tablets (4-8 mg) by mouth every 8 hours as needed for nausea (Patient not taking: Reported on 12/13/2019) 10 tablet 0     propranolol (INDERAL LA) 60 MG 24 hr capsule Take 1 capsule (60 mg) by mouth daily (Patient not taking: Reported on 12/13/2019) 90 capsule 3     REXULTI 0.5 MG tablet TK 1 T PO QD  5     vilazodone (VIIBRYD) 40 MG TABS tablet 60 mg        VITAMIN D, CHOLECALCIFEROL, PO Take 1,000 Units by mouth 2 times daily Vitamin D          No Known Allergies    Review of Systems:  -Constitutional: Patient is otherwise feeling well, in usual state of health.   -Skin: As above in HPI. No additional skin concerns.    Physical exam:  Vitals: There were no vitals taken for this visit.  GEN: This is a well developed, well-nourished male in no acute distress, in a pleasant mood.    SKIN: Total skin excluding the  undergarment areas was performed. The exam included the head/face, neck, both arms, chest, back, abdomen, both legs, digits and/or nails and buttocks.   - Lizama Type II  - Scattered brown macules on sun exposed areas.  - Excoriations on the upper back with no primary rash  - There are dome shaped bright red papules on the trunk.   - Multiple regular brown pigmented macules and papules are identified on the trunk.    - There are waxy stuck on tan to brown papules on the trunk and thighs  - AK on the right dorsal hand  - Stucco keratosis on the bilateral lower shins   - No other lesions of concern on areas examined.     Impression/Plan:    1. Sun damaged skin with solar lentigines  - Recommend sunscreens SPF #30 or greater, protective clothing and avoidance of tanning beds.    2. Benign findings including: seborrheic keratoses, cherry angioma  - No further intervention required. Patient to report changes.   - Patient reassured of the benign nature of these lesions.    3. Multiple clinically benign nevi  - No further intervention required. Patient to report changes.   - Patient reassured of the benign nature of these lesions.    4. Actinic Keratosis. Discussed precancerous nature of these lesions. Recommended treatment to prevent progression to a squamous cell carcinoma. Advised patient to call for follow up if not resolved in 1 month.   - Cryotherapy procedure note: see below    5. Xerosis cutis. Discussed the pathogenesis of this condition and empathized the importance of gentle skin care and cream based moisturizer for long term treatment.    - Recommeded to use daily moisturizers, I recommend a cream based moisturizer, like CeraVe or Vanicream.     Follow-up in 1-2 years for skin check, earlier for new or changing lesions.     Staff Involved: medical student/scribe/staff  Janelle SLAUGHTER, MS3, saw and examined the patient with Dr. Mehta.    Scribe Disclosure  I, Suzanne Garcia, am serving as a scribe to  document services personally performed by Dr. Li Mehta MD, based on data collection and the provider's statements to me.     Staff Physician:  I was present with the medical student who participated in the service and in the documentation of the note. I have verified the history and personally performed the physical exam and medical decision making. I agree with the assessment and plan of care as documented in the note.     The procedure(s) was(were) performed by myself.  Cryotherapy procedure note: After verbal consent and discussion of risks and benefits, 1 AK  was(were) treated with liquid nitrogen cryotherapy for 1  freeze/thaw cycles with 1-2mm borders. Post-cryotherapy wound care instructions were discussed and provided in written format.     Li Mehta MD    Department of Dermatology  [facility]: Phone: [phone], Fax:[phone]  [facility]: Phone: [phone], Fax: [phone]

## 2019-12-13 NOTE — NURSING NOTE
Chief Complaint   Patient presents with     Skin Check     Marvin is here today for a Skin Check. He has Lesions on concern Right Hip, Hand, and Ankle.      Caryn Fuentes LPN

## 2019-12-13 NOTE — PROGRESS NOTES
[facility] Dermatology Note    Dermatology Problem List:  1. iSK  -s/p cryotherapy   2. Xerosis cutis  -current t/x: cream based moisturizer  3. AK  -s/p cryotherapy    Encounter Date: Dec 13, 2019    CC:  Chief Complaint   Patient presents with     Skin Check     Marvin is here today for a Skin Check. He has Lesions on concern Right Hip, Hand, and Ankle.      History of Present Illness:  Mr. Marvin Elaine is a 64 year old male who presents as a follow-up skin check. He was last seen on 3/1/17 when iSK were treated with cryotherapy. He has a family history of NMSC in his brother, no personal history of skin cancer. Today he has areas of concerns on his right thigh, hand, and ankle. These are somewhat crusty and brown in color, they do not bleed, itch, or become tender. Denies any other tender, nonhealing, bleeding skin lesions. Additionally he has been itching at his back and scalp a lot lately. He originally thought he had acne in these area but it is continually itchy. He does not moisturize his skin ever. No other concerns addressed today.    Past Medical History:   Patient Active Problem List   Diagnosis     Anxiety     Major depression     CARDIOVASCULAR SCREENING; LDL GOAL LESS THAN 160     Chronic pelvic pain in male     Chronic pain of right knee     Osteochondritis     Chronic midline low back pain without sciatica     Chronic bilateral low back pain without sciatica     Encounter for general adult medical examination with abnormal findings     Past Medical History:   Diagnosis Date     Anxiety      Depressive disorder      Prostate infection      Past Surgical History:   Procedure Laterality Date     LAPAROSCOPIC APPENDECTOMY  1/21/2012    Procedure:LAPAROSCOPIC APPENDECTOMY; Open Appendectomy; Surgeon:GERDA GASTON; Location: OR       Social History:  Patient reports that he has never smoked. He has never used smokeless tobacco. He reports that he does not drink  alcohol or use drugs.    Family History:  History of NMSC in his brother (probable BCC)    Medications:  Current Outpatient Medications   Medication Sig Dispense Refill     ALPRAZOLAM PO Take 0.25 mg by mouth 2 times daily        latanoprost (XALATAN) 0.005 % ophthalmic solution Place 1 drop into both eyes daily   0     PARoxetine (PAXIL) 30 MG tablet Take by mouth every morning       ASPIRIN PO Take 81 mg by mouth daily       buPROPion (WELLBUTRIN) 100 MG tablet Take 150 mg by mouth 3 times daily  90 tablet      ibuprofen (ADVIL/MOTRIN) 200 MG tablet Take 2 tablets (400 mg) by mouth every 8 hours as needed for pain (Patient not taking: Reported on 12/13/2019) 60 tablet 0     multivitamin, therapeutic with minerals (MULTI-VITAMIN) TABS Take 1 tablet by mouth daily       ondansetron (ZOFRAN ODT) 4 MG ODT tab Take 1-2 tablets (4-8 mg) by mouth every 8 hours as needed for nausea (Patient not taking: Reported on 12/13/2019) 10 tablet 0     propranolol (INDERAL LA) 60 MG 24 hr capsule Take 1 capsule (60 mg) by mouth daily (Patient not taking: Reported on 12/13/2019) 90 capsule 3     REXULTI 0.5 MG tablet TK 1 T PO QD  5     vilazodone (VIIBRYD) 40 MG TABS tablet 60 mg        VITAMIN D, CHOLECALCIFEROL, PO Take 1,000 Units by mouth 2 times daily Vitamin D          No Known Allergies    Review of Systems:  -Constitutional: Patient is otherwise feeling well, in usual state of health.   -Skin: As above in HPI. No additional skin concerns.    Physical exam:  Vitals: There were no vitals taken for this visit.  GEN: This is a well developed, well-nourished male in no acute distress, in a pleasant mood.    SKIN: Total skin excluding the undergarment areas was performed. The exam included the head/face, neck, both arms, chest, back, abdomen, both legs, digits and/or nails and buttocks.   - Lizama Type II  - Scattered brown macules on sun exposed areas.  - Excoriations on the upper back with no primary rash  - There are dome  shaped bright red papules on the trunk.   - Multiple regular brown pigmented macules and papules are identified on the trunk.    - There are waxy stuck on tan to brown papules on the trunk and thighs  - AK on the right dorsal hand  - Stucco keratosis on the bilateral lower shins   - No other lesions of concern on areas examined.     Impression/Plan:    1. Sun damaged skin with solar lentigines  - Recommend sunscreens SPF #30 or greater, protective clothing and avoidance of tanning beds.    2. Benign findings including: seborrheic keratoses, cherry angioma  - No further intervention required. Patient to report changes.   - Patient reassured of the benign nature of these lesions.    3. Multiple clinically benign nevi  - No further intervention required. Patient to report changes.   - Patient reassured of the benign nature of these lesions.    4. Actinic Keratosis. Discussed precancerous nature of these lesions. Recommended treatment to prevent progression to a squamous cell carcinoma. Advised patient to call for follow up if not resolved in 1 month.   - Cryotherapy procedure note: see below    5. Xerosis cutis. Discussed the pathogenesis of this condition and empathized the importance of gentle skin care and cream based moisturizer for long term treatment.    - Recommeded to use daily moisturizers, I recommend a cream based moisturizer, like CeraVe or Vanicream.     Follow-up in 1-2 years for skin check, earlier for new or changing lesions.     Staff Involved: medical student/scribe/staff  Janelle SLAUGHTER, MS3, saw and examined the patient with Dr. Mehta.    Scribe Disclosure  I, Suzanne Garcia, am serving as a scribe to document services personally performed by Dr. Li Mehta MD, based on data collection and the provider's statements to me.     Staff Physician:  I was present with the medical student who participated in the service and in the documentation of the note. I have verified the history and  personally performed the physical exam and medical decision making. I agree with the assessment and plan of care as documented in the note.     The procedure(s) was(were) performed by myself.  Cryotherapy procedure note: After verbal consent and discussion of risks and benefits, 1 AK  was(were) treated with liquid nitrogen cryotherapy for 1  freeze/thaw cycles with 1-2mm borders. Post-cryotherapy wound care instructions were discussed and provided in written format.     Li Mehta MD    Department of Dermatology  [facility]: Phone: [phone], Fax:[phone]  [facility]: Phone: [phone], Fax: [phone]

## 2019-12-13 NOTE — PATIENT INSTRUCTIONS
I recommend a cream based moisturizer, like CeraVe or Vanicream.     Cryotherapy    What is it?    Use of a very cold liquid, such as liquid nitrogen, to freeze and destroy abnormal skin cells that need to be removed    What should I expect?    Tenderness and redness    A small blister that might grow and fill with dark purple blood. There may be crusting.    More than one treatment may be needed if the lesions do not go away.    How do I care for the treated area?    Gently wash the area with your hands when bathing.    Use a thin layer of Vaseline to help with healing. You may use a Band-Aid.     The area should heal within 7-10 days and may leave behind a pink or lighter color.     Do not use an antibiotic or Neosporin ointment.     You may take acetaminophen (Tylenol) for pain.     Call your Doctor if you have:    Severe pain    Signs of infection (warmth, redness, cloudy yellow drainage, and or a bad smell)    Questions or concerns    Who should I call with questions?       [facility]: [phone]       [facility]: [phone]       For urgent needs outside of business hours call the [facility] at [phone]        and ask for the dermatology resident on call

## 2020-01-09 ENCOUNTER — OFFICE VISIT (OUTPATIENT)
Dept: DERMATOLOGY | Facility: CLINIC | Age: 65
End: 2020-01-09
Payer: COMMERCIAL

## 2020-01-09 DIAGNOSIS — L73.9 FOLLICULITIS: ICD-10-CM

## 2020-01-09 DIAGNOSIS — L85.3 XEROSIS CUTIS: ICD-10-CM

## 2020-01-09 DIAGNOSIS — L30.9 DERMATITIS: Primary | ICD-10-CM

## 2020-01-09 RX ORDER — HYDROCORTISONE VALERATE CREAM 2 MG/G
CREAM TOPICAL
Qty: 60 G | Refills: 3 | Status: SHIPPED | OUTPATIENT
Start: 2020-01-09 | End: 2021-10-21

## 2020-01-09 ASSESSMENT — PAIN SCALES - GENERAL: PAINLEVEL: NO PAIN (0)

## 2020-01-09 NOTE — LETTER
1/9/2020       RE: Marvin Elaine  [address]  [facility] [address]     Dear Colleague,    Thank you for referring your patient, Marvin Elaine, to the [facility] DERMATOLOGY at [facility]. Please see a copy of my visit note below.    [facility] Dermatology Note    Dermatology Problem List:  1.  ISK  -s/p cryotherapy   2. Xerosis cutis  -current t/x: cream based moisturizer  3. AK  -s/p cryotherapy  4. Acne / folliculitis with likely secondary acne excorie.   - current tx: bleach baths and hydrocortisone 0.2% cream    Encounter Date: Jan 9, 2020    CC:  Chief Complaint   Patient presents with     Derm Problem     Nick says there is something on his chest and back     History of Present Illness:  Mr. Marvin Elaine is a 64 year old male who presents for evaluation of a rash on his upper back. He was last seen 12/13/19 for cryotherapy of an AK and was recommended to use a moisturizer for his back. At today's visit, he would like to have a few lesions on his back and arms examined. They are itchy, and initially present as pimples and blackheads. He has been using Vanicream with no effect. Health otherwise stable. No other skin concerns.     Past Medical History:   Patient Active Problem List   Diagnosis     Anxiety     Major depression     CARDIOVASCULAR SCREENING; LDL GOAL LESS THAN 160     Chronic pelvic pain in male     Chronic pain of right knee     Osteochondritis     Chronic midline low back pain without sciatica     Chronic bilateral low back pain without sciatica     Encounter for general adult medical examination with abnormal findings     Past Medical History:   Diagnosis Date     Anxiety      Depressive disorder      Prostate infection      Past Surgical History:   Procedure Laterality Date     LAPAROSCOPIC APPENDECTOMY  1/21/2012    Procedure:LAPAROSCOPIC APPENDECTOMY; Open Appendectomy; Surgeon:GERDA GASTON;  Location:[facility]       Social History:  Patient reports that he has never smoked. He has never used smokeless tobacco. He reports that he does not drink alcohol or use drugs.    Family History:  Family History   Problem Relation Age of Onset     Respiratory Mother         copd     Psychotic Disorder Mother      C.A.D. Father      Psychotic Disorder Father         depression, anxiety, alcohol abuse     Prostate Cancer No family hx of      Cancer - colorectal No family hx of      Diabetes No family hx of      Skin Cancer No family hx of        Medications:  Current Outpatient Medications   Medication Sig Dispense Refill     ALPRAZOLAM PO Take 0.25 mg by mouth 2 times daily        hydrocortisone (WESTCORT) 0.2 % external cream Apply twice daily as needed for itchy spots on back 60 g 3     latanoprost (XALATAN) 0.005 % ophthalmic solution Place 1 drop into both eyes daily   0     PARoxetine (PAXIL) 30 MG tablet Take by mouth every morning       ASPIRIN PO Take 81 mg by mouth daily       buPROPion (WELLBUTRIN) 100 MG tablet Take 150 mg by mouth 3 times daily  90 tablet      ibuprofen (ADVIL/MOTRIN) 200 MG tablet Take 2 tablets (400 mg) by mouth every 8 hours as needed for pain (Patient not taking: Reported on 12/13/2019) 60 tablet 0     multivitamin, therapeutic with minerals (MULTI-VITAMIN) TABS Take 1 tablet by mouth daily       ondansetron (ZOFRAN ODT) 4 MG ODT tab Take 1-2 tablets (4-8 mg) by mouth every 8 hours as needed for nausea (Patient not taking: Reported on 12/13/2019) 10 tablet 0     propranolol (INDERAL LA) 60 MG 24 hr capsule Take 1 capsule (60 mg) by mouth daily (Patient not taking: Reported on 1/9/2020) 90 capsule 3     REXULTI 0.5 MG tablet TK 1 T PO QD  5     vilazodone (VIIBRYD) 40 MG TABS tablet 60 mg        VITAMIN D, CHOLECALCIFEROL, PO Take 1,000 Units by mouth 2 times daily Vitamin D          No Known Allergies    Review of Systems:  -Constitutional: Patient is otherwise feeling well, in usual  state of health.   -Skin: As above in HPI. No additional skin concerns.    Physical exam:  Vitals: There were no vitals taken for this visit.  GEN: This is a well developed, well-nourished male in no acute distress, in a pleasant mood.    SKIN: Focused examination of the back and arms was performed.  - Lizama Type II  - Follicular based papules on the upper back with overlying linear excoriations.   - Mild xerosis cutis on trunk.   - No other lesions of concern on areas examined.     Impression/Plan:    1. Acne / folliculitis with likely acne excorie.   - Recommended to use bleach baths or bleach soaks to the upper back once or twice weekly  - Start hydrocortisone 0.2% cream BID PRN for pruritic lesions until resolved    2. Xerosis cutis. Dry skin care discussed.     Follow-up in one year for a skin check, earlier for new or changing lesions.     Staff Involved:  Scribe/Staff    Scribe Disclosure  I, Michelle Diamond, am serving as a scribe to document services personally performed by Dr. Harpal Dalal MD, based on data collection and the provider's statements to me.     Provider Disclosure:   The documentation recorded by the scribe accurately reflects the services I personally performed and the decisions made by me.    Harpal Dalal MD    Department of Dermatology  [facility]: Phone: [phone], Fax:[phone]  [facility]: Phone: [phone], Fax: [phone]

## 2020-01-09 NOTE — PATIENT INSTRUCTIONS
"\"Swimming Pool\" Bleach Baths    How do I make a dilute bleach bath?    Use household bleach (such as Clorox ). Check the bottle to make sure that the concentration of bleach is about 6%. The bleach concentration may be labeled as sodium hypochlorite.     Avoid bleach that is labeled \"concentrated,\" \"splashless\" or has a fragrance.    Fill up the tub with lukewarm water (about 40 gallons in a normal bathtub).    Pour   to   cup of bleach into the bath water for a normal full bathtub.    For smaller tubs, use two teaspoons of bleach per gallon of water.     Completely mix the added bleach in the water.    How do I use a dilute bleach bath for treatment?    For adults and children, soak in the water for about 10 minutes.    Thoroughly rinse the skin with fresh, clean, lukewarm water after the bleach bath.     Pat the skin dry gently and then put on medicine and/or moisturizer.     Repeat bleach baths two to three times a week.     Is there anything else I need to know about taking these baths?    Do not use bleach directly on the skin.    Do not let bleach mixture come in contact with eyes or scalp.    Do not drink dilute bleach mixture.    Drain unused portion right after each use.    Keep bleach bottle out of reach of children.    "

## 2020-01-09 NOTE — NURSING NOTE
Dermatology Rooming Note    Marvin Elaine's goals for this visit include:   Chief Complaint   Patient presents with     Derm Problem     Nick says there is something on his chest and back     Isaac Ward, CMA

## 2020-01-09 NOTE — PROGRESS NOTES
[facility] Dermatology Note    Dermatology Problem List:  1. ISK  -s/p cryotherapy   2. Xerosis cutis  -current t/x: cream based moisturizer  3. AK  -s/p cryotherapy  4. Acne / folliculitis with likely secondary acne excorie.   - current tx: bleach baths and hydrocortisone 0.2% cream    Encounter Date: Jan 9, 2020    CC:  Chief Complaint   Patient presents with     Derm Problem     Nick says there is something on his chest and back     History of Present Illness:  Mr. Marvin Elaine is a 64 year old male who presents for evaluation of a rash on his upper back. He was last seen 12/13/19 for cryotherapy of an AK and was recommended to use a moisturizer for his back. At today's visit, he would like to have a few lesions on his back and arms examined. They are itchy, and initially present as pimples and blackheads. He has been using Vanicream with no effect. Health otherwise stable. No other skin concerns.     Past Medical History:   Patient Active Problem List   Diagnosis     Anxiety     Major depression     CARDIOVASCULAR SCREENING; LDL GOAL LESS THAN 160     Chronic pelvic pain in male     Chronic pain of right knee     Osteochondritis     Chronic midline low back pain without sciatica     Chronic bilateral low back pain without sciatica     Encounter for general adult medical examination with abnormal findings     Past Medical History:   Diagnosis Date     Anxiety      Depressive disorder      Prostate infection      Past Surgical History:   Procedure Laterality Date     LAPAROSCOPIC APPENDECTOMY  1/21/2012    Procedure:LAPAROSCOPIC APPENDECTOMY; Open Appendectomy; Surgeon:GERDA GASTON; Location: OR       Social History:  Patient reports that he has never smoked. He has never used smokeless tobacco. He reports that he does not drink alcohol or use drugs.    Family History:  Family History   Problem Relation Age of Onset     Respiratory Mother         copd     Psychotic  Disorder Mother      C.A.D. Father      Psychotic Disorder Father         depression, anxiety, alcohol abuse     Prostate Cancer No family hx of      Cancer - colorectal No family hx of      Diabetes No family hx of      Skin Cancer No family hx of        Medications:  Current Outpatient Medications   Medication Sig Dispense Refill     ALPRAZOLAM PO Take 0.25 mg by mouth 2 times daily        hydrocortisone (WESTCORT) 0.2 % external cream Apply twice daily as needed for itchy spots on back 60 g 3     latanoprost (XALATAN) 0.005 % ophthalmic solution Place 1 drop into both eyes daily   0     PARoxetine (PAXIL) 30 MG tablet Take by mouth every morning       ASPIRIN PO Take 81 mg by mouth daily       buPROPion (WELLBUTRIN) 100 MG tablet Take 150 mg by mouth 3 times daily  90 tablet      ibuprofen (ADVIL/MOTRIN) 200 MG tablet Take 2 tablets (400 mg) by mouth every 8 hours as needed for pain (Patient not taking: Reported on 12/13/2019) 60 tablet 0     multivitamin, therapeutic with minerals (MULTI-VITAMIN) TABS Take 1 tablet by mouth daily       ondansetron (ZOFRAN ODT) 4 MG ODT tab Take 1-2 tablets (4-8 mg) by mouth every 8 hours as needed for nausea (Patient not taking: Reported on 12/13/2019) 10 tablet 0     propranolol (INDERAL LA) 60 MG 24 hr capsule Take 1 capsule (60 mg) by mouth daily (Patient not taking: Reported on 1/9/2020) 90 capsule 3     REXULTI 0.5 MG tablet TK 1 T PO QD  5     vilazodone (VIIBRYD) 40 MG TABS tablet 60 mg        VITAMIN D, CHOLECALCIFEROL, PO Take 1,000 Units by mouth 2 times daily Vitamin D          No Known Allergies    Review of Systems:  -Constitutional: Patient is otherwise feeling well, in usual state of health.   -Skin: As above in HPI. No additional skin concerns.    Physical exam:  Vitals: There were no vitals taken for this visit.  GEN: This is a well developed, well-nourished male in no acute distress, in a pleasant mood.    SKIN: Focused examination of the back and arms was  performed.  - Lizama Type II  - Follicular based papules on the upper back with overlying linear excoriations.   - Mild xerosis cutis on trunk.   - No other lesions of concern on areas examined.     Impression/Plan:    1. Acne / folliculitis with likely acne excorie.   - Recommended to use bleach baths or bleach soaks to the upper back once or twice weekly  - Start hydrocortisone 0.2% cream BID PRN for pruritic lesions until resolved    2. Xerosis cutis. Dry skin care discussed.     Follow-up in one year for a skin check, earlier for new or changing lesions.     Staff Involved:  Scribe/Staff    Scribe Disclosure  I, Michelle Diamond, am serving as a scribe to document services personally performed by Dr. Harpal Dalal MD, based on data collection and the provider's statements to me.     Provider Disclosure:   The documentation recorded by the scribe accurately reflects the services I personally performed and the decisions made by me.    Harpal Dalal MD    Department of Dermatology  [facility]: Phone: [phone], Fax:[phone]  [facility]: Phone: [phone], Fax: [phone]

## 2020-02-07 ENCOUNTER — TELEPHONE (OUTPATIENT)
Dept: DERMATOLOGY | Facility: CLINIC | Age: 65
End: 2020-02-07

## 2020-02-07 NOTE — TELEPHONE ENCOUNTER
I called the patient back and he states that he has dermatitis. This morning her felt a bump on his arm and he scratched it. He states that it has been itching very bad for the last few hours. He states that he becomes very tired. He states that he has used several topical medications over time on his dermatitis. He states that he has put 4 things on it since he scratched it. He was able to get the itching to stop. He states that he cannot see anything on his arm, but he is more concerned about the tiredness he has when there is a flare. Is this connected to his flares?  Zully Ventura, CMA

## 2020-02-07 NOTE — TELEPHONE ENCOUNTER
[facility] Call Center    Phone Message    May a detailed message be left on voicemail: yes     Reason for Call: Symptoms or Concerns     If patient has red-flag symptoms, warm transfer to triage line    Current symptom or concern: Pt suffers from Dermatitis, Folliculitis and Xerosis cutis.  He recently scratch at his papules and opened some resulting in extreme itching, pain, and exhaustion.  Pt is concerned about theses symptoms and would like a call form staff to discuss his symptoms  Symptoms have been present for:  3 week(s)    Has patient previously been seen for this? Yes    By Tyrese Dalal MD  Date: 01/09/19    Are there any new or worsening symptoms? Yes: itching, pain, and exhaustion since he burst the papules      Action Taken: Message routed to:  [facility] ([facility] dermatology    Travel Screening: Not Applicable

## 2020-02-10 ENCOUNTER — HEALTH MAINTENANCE LETTER (OUTPATIENT)
Age: 65
End: 2020-02-10

## 2020-02-10 NOTE — TELEPHONE ENCOUNTER
Harpal Dalal MD  You; [facility] 2 days ago      Can you tell him to use the topical steroid we prescribed for any itchy lesions? Westcort 0.2%. I think he has a follow-up visit scheduled, but if he would like to be seen earlier, he can make a sooner follow-up appointment to be seen in person.

## 2020-02-10 NOTE — TELEPHONE ENCOUNTER
I called the patient back and he was given the information below. He states that he is more concerned about being tired with the flare. He would like to know if there is any connection between his flare ups of his dermatitis and his fatigue? The patient states that he has been doing the bleach bathes and he does think that they work well for him, he would like to know if there is anything systemically that he can take to help?   Zully Ventura, CMA

## 2020-02-12 NOTE — TELEPHONE ENCOUNTER
Called pt back and gave him the message below. Pt states that he wants to know a medication that would work systemically to help with the itching. He said that the 2% cream that he was prescribed has not helped so he has used benadryl and a lidocaine cream to help. He said that bleach baths don't work well for him. I let him know that I will pass this message along to Dr. Dalal, but I told him we should make a follow up appointment to address more of his concerns. Pt agreed and I have him scheduled to see Dr. Dalal on 2/18/20 at 1PM for a follow up. Pt would like a call back on if Dr. Dalal will answer his questions now or if he should wait till his appointment. Otherwise, pt had no further questions or concerns at this time.   ERIN Grimes

## 2020-02-18 ENCOUNTER — OFFICE VISIT (OUTPATIENT)
Dept: DERMATOLOGY | Facility: CLINIC | Age: 65
End: 2020-02-18
Payer: COMMERCIAL

## 2020-02-18 DIAGNOSIS — L73.9 FOLLICULITIS: Primary | ICD-10-CM

## 2020-02-18 RX ORDER — CLINDAMYCIN PHOSPHATE 10 UG/ML
LOTION TOPICAL
Qty: 120 ML | Refills: 11 | Status: SHIPPED | OUTPATIENT
Start: 2020-02-18 | End: 2023-05-12

## 2020-02-18 RX ORDER — DOXYCYCLINE 100 MG/1
100 CAPSULE ORAL 2 TIMES DAILY
Qty: 60 CAPSULE | Refills: 2 | Status: SHIPPED | OUTPATIENT
Start: 2020-02-18 | End: 2020-05-19

## 2020-02-18 RX ORDER — CLINDAMYCIN PHOSPHATE 11.9 MG/ML
SOLUTION TOPICAL
Qty: 60 ML | Refills: 1 | Status: SHIPPED | OUTPATIENT
Start: 2020-02-18

## 2020-02-18 ASSESSMENT — PAIN SCALES - GENERAL: PAINLEVEL: NO PAIN (0)

## 2020-02-18 NOTE — LETTER
2/18/2020       RE: Marvin Elaine  [address]  [facility] [address]     Dear Colleague,    Thank you for referring your patient, Marvin Elaine, to the [facility] DERMATOLOGY at [facility]. Please see a copy of my visit note below.    [facility] Dermatology Note    Dermatology Problem List:  1. ISK  -s/p cryotherapy   2. Xerosis cutis  -current t/x: cream based moisturizer  3. AK  -s/p cryotherapy  4. Acne / folliculitis with likely secondary acne excorie.   - current tx: doxycycline 100 mg BID, BP 4-5% wash, clindamycin 1% lotion, clindamycin 1% solution  - previous tx: bleach baths, hydrocortisone 0.2% cream    Encounter Date: Feb 18, 2020    CC:  Chief Complaint   Patient presents with     Derm Problem     Nick is here today for itching follow up. No changes.        History of Present Illness:  Mr. Marvin Elaine is a 64 year old male who presents as a follow up for acne / folliculitis with likely secondary acne excorie.  The patient reports no improvement from prior using bleach baths and hydrocortisone cream. He has also tried using 4% lidocaine cream and westcourt but would like to discuss other treatment options today. Health otherwise stable. No other skin concerns.     Past Medical History:   Patient Active Problem List   Diagnosis     Anxiety     Persistent depressive disorder     CARDIOVASCULAR SCREENING; LDL GOAL LESS THAN 160     Chronic pelvic pain in male     Chronic pain of right knee     Osteochondritis     Chronic midline low back pain without sciatica     Chronic bilateral low back pain without sciatica     Encounter for general adult medical examination with abnormal findings     Past Medical History:   Diagnosis Date     Anxiety      Depressive disorder      Prostate infection      Past Surgical History:   Procedure Laterality Date     LAPAROSCOPIC APPENDECTOMY  1/21/2012    Procedure:LAPAROSCOPIC APPENDECTOMY; Open  Appendectomy; Surgeon:GERDA GASTON; Location:[facility] OR       Social History:  Patient reports that he has never smoked. He has never used smokeless tobacco. He reports that he does not drink alcohol or use drugs.    Family History:  Family History   Problem Relation Age of Onset     Respiratory Mother         copd     Psychotic Disorder Mother      C.A.D. Father      Psychotic Disorder Father         depression, anxiety, alcohol abuse     Prostate Cancer No family hx of      Cancer - colorectal No family hx of      Diabetes No family hx of      Skin Cancer No family hx of        Medications:  Current Outpatient Medications   Medication Sig Dispense Refill     ALPRAZOLAM PO Take 0.25 mg by mouth 2 times daily        ASPIRIN PO Take 81 mg by mouth daily       hydrocortisone (WESTCORT) 0.2 % external cream Apply twice daily as needed for itchy spots on back 60 g 3     latanoprost (XALATAN) 0.005 % ophthalmic solution Place 1 drop into both eyes daily   0     multivitamin, therapeutic with minerals (MULTI-VITAMIN) TABS Take 1 tablet by mouth daily       PARoxetine (PAXIL) 30 MG tablet Take by mouth every morning       REXULTI 0.5 MG tablet TK 1 T PO QD  5     vilazodone (VIIBRYD) 40 MG TABS tablet 60 mg        VITAMIN D, CHOLECALCIFEROL, PO Take 1,000 Units by mouth 2 times daily Vitamin D        buPROPion (WELLBUTRIN) 100 MG tablet Take 150 mg by mouth 3 times daily  90 tablet      ibuprofen (ADVIL/MOTRIN) 200 MG tablet Take 2 tablets (400 mg) by mouth every 8 hours as needed for pain (Patient not taking: Reported on 12/13/2019) 60 tablet 0     ondansetron (ZOFRAN ODT) 4 MG ODT tab Take 1-2 tablets (4-8 mg) by mouth every 8 hours as needed for nausea (Patient not taking: Reported on 12/13/2019) 10 tablet 0     propranolol (INDERAL LA) 60 MG 24 hr capsule Take 1 capsule (60 mg) by mouth daily (Patient not taking: Reported on 1/9/2020) 90 capsule 3       No Known Allergies    Review of  Systems:  -Constitutional: Patient is otherwise feeling well, in usual state of health.   -Skin: As above in HPI. No additional skin concerns.    Physical exam:  Vitals: There were no vitals taken for this visit.  GEN: This is a well developed, well-nourished male in no acute distress, in a pleasant mood.    SKIN: Acne exam, which includes the face, neck, upper central chest, and upper central back was performed.  - Lizama Type II   - Follicular based papules on the upper back with overlying linear excoriations.   - Mild xerosis cutis on trunk.   - No other lesions of concern on areas examined.     Impression/Plan:    1. Acne / folliculitis with likely acne excorie      Start doxycycline 100 mg BID. Discussed side effects including GI upset and photosensitivity.     Start BP 4-5% wash 2-3x/week    Start clindamyin 1% lotion qdaily to the arms, chest and back    Start clindamycin 1% solution qdaily to the scalp    Follow-up in 3 months , earlier for new or changing lesions.     Staff Involved:  Scribe/Staff    Scribe Disclosure  I, Michelle Diamond, am serving as a scribe to document services personally performed by Dr. Harpal Dalal MD, based on data collection and the provider's statements to me.     Provider Disclosure:   The documentation recorded by the scribe accurately reflects the services I personally performed and the decisions made by me.    Harpal Dalal MD    Department of Dermatology  [facility]: Phone: [phone], Fax:[phone]  [facility]: Phone: [phone] Fax: [phone]

## 2020-02-18 NOTE — PATIENT INSTRUCTIONS
Acne Treatment  - Take doxycycline 100 mg ( 1 tablet ) twice a day. Please take with food to avoid stomach upset.   - Use a benzoyl peroxide 4-5% wash. This is an over-the-counter product and can be found at your local pharmacy, [employer] or [employer]. Use a few times a week while you shower  - Apply clindamycin 1% lotion to the arms, chest and back..   - Apply clindamycin 1% solution after you shower

## 2020-02-18 NOTE — NURSING NOTE
Chief Complaint   Patient presents with     Derm Problem     Nick is here today for itching follow up. No changes.      Caryn Fuentes LPN

## 2020-04-29 ENCOUNTER — VIRTUAL VISIT (OUTPATIENT)
Dept: FAMILY MEDICINE | Facility: CLINIC | Age: 65
End: 2020-04-29
Payer: COMMERCIAL

## 2020-04-29 ENCOUNTER — NURSE TRIAGE (OUTPATIENT)
Dept: NURSING | Facility: CLINIC | Age: 65
End: 2020-04-29

## 2020-04-29 DIAGNOSIS — J30.1 SEASONAL ALLERGIC RHINITIS DUE TO POLLEN: ICD-10-CM

## 2020-04-29 DIAGNOSIS — R22.1 THROAT SWELLING: Primary | ICD-10-CM

## 2020-04-29 PROCEDURE — 99213 OFFICE O/P EST LOW 20 MIN: CPT | Mod: 95 | Performed by: FAMILY MEDICINE

## 2020-04-29 ASSESSMENT — PATIENT HEALTH QUESTIONNAIRE - PHQ9: SUM OF ALL RESPONSES TO PHQ QUESTIONS 1-9: 0

## 2020-04-29 NOTE — PROGRESS NOTES
"Marvin Elaine is a 64 year old male who is being evaluated via a billable telephone visit.      The patient has been notified of following:     \"This telephone visit will be conducted via a call between you and your physician/provider. We have found that certain health care needs can be provided without the need for a physical exam.  This service lets us provide the care you need with a short phone conversation.  If a prescription is necessary we can send it directly to your pharmacy.  If lab work is needed we can place an order for that and you can then stop by our lab to have the test done at a later time.    Telephone visits are billed at different rates depending on your insurance coverage. During this emergency period, for some insurers they may be billed the same as an in-person visit.  Please reach out to your insurance provider with any questions.    If during the course of the call the physician/provider feels a telephone visit is not appropriate, you will not be charged for this service.\"     Patient has given verbal consent for Telephone visit?  Yes    How would you like to obtain your AVS? MyChart    Subjective     Marvin Elaine is a 64 year old male who presents to clinic today for the following health issues:    HPI  RESPIRATORY SYMPTOMS      Duration: Couple of days ago and  today    Description  Pressure in the throat that is intermittent.     Severity: mild    Accompanying signs and symptoms: See above    History (predisposing factors):  none    Precipitating or alleviating factors: None  Therapies tried and outcome:  None        Video Start Time: 1:38         Throat swelling  Seasonal allergic rhinitis due to pollen :yesterday and today increased eye itching, sneezing and feeling of throat swelling on and off.  No stridor or shortness of breath.  Since happened more than once called triage - directed to call 911, ems arrived and evaluated.  Thought was stable and could follow up via clinic " "visit.  He reports they listened to his lungs, throat and did physical exam of neck.  Was told \"possibly left fatima apple swollen.\"     No tongue or lip swelling (excpet when had sunburn recently).  No dizziness.  No abdominal pain.     New medications include doxycyline which he continues for folliculitis (two weeks left, folliculitis almost gone) and clindamycin gel topical.  No hives.     Took 50mg benadryl today and thinks possibly helped.     Still has feeling now but much better.         Problem list, Medication list, Allergies, and Medical/Social/Surgical histories reviewed in EPIC and updated as appropriate.Reviewed and updated as needed this visit by Provider  Labs reviewed in EPIC    Patient Active Problem List   Diagnosis     Anxiety     Persistent depressive disorder     CARDIOVASCULAR SCREENING; LDL GOAL LESS THAN 160     Chronic pelvic pain in male     Chronic pain of right knee     Osteochondritis     Chronic midline low back pain without sciatica     Chronic bilateral low back pain without sciatica     Encounter for general adult medical examination with abnormal findings     Past Surgical History:   Procedure Laterality Date     LAPAROSCOPIC APPENDECTOMY  1/21/2012    Procedure:LAPAROSCOPIC APPENDECTOMY; Open Appendectomy; Surgeon:GERDA GASTON; Location: OR       Social History     Tobacco Use     Smoking status: Never Smoker     Smokeless tobacco: Never Used   Substance Use Topics     Alcohol use: No     Family History   Problem Relation Age of Onset     Respiratory Mother         copd     Psychotic Disorder Mother      C.A.D. Father      Psychotic Disorder Father         depression, anxiety, alcohol abuse     Prostate Cancer No family hx of      Cancer - colorectal No family hx of      Diabetes No family hx of      Skin Cancer No family hx of            Current Outpatient Medications   Medication Sig Dispense Refill     doxycycline monohydrate 100 MG PO capsule Take 1 capsule " (100 mg) by mouth 2 times daily 60 capsule 2     latanoprost (XALATAN) 0.005 % ophthalmic solution Place 1 drop into both eyes daily   0     PARoxetine (PAXIL) 30 MG tablet Take by mouth every morning       ALPRAZOLAM PO Take 0.25 mg by mouth 2 times daily        ASPIRIN PO Take 81 mg by mouth daily       buPROPion (WELLBUTRIN) 100 MG tablet Take 150 mg by mouth 3 times daily  90 tablet      clindamycin 1 % EX external lotion Apply once daily to arms, chest, and back. (Patient not taking: Reported on 4/29/2020) 120 mL 11     clindamycin 1 % EX external solution Apply 10-15 drops once daily to the scalp. (Patient not taking: Reported on 4/29/2020) 60 mL 1     hydrocortisone (WESTCORT) 0.2 % external cream Apply twice daily as needed for itchy spots on back (Patient not taking: Reported on 4/29/2020) 60 g 3     ibuprofen (ADVIL/MOTRIN) 200 MG tablet Take 2 tablets (400 mg) by mouth every 8 hours as needed for pain (Patient not taking: Reported on 12/13/2019) 60 tablet 0     multivitamin, therapeutic with minerals (MULTI-VITAMIN) TABS Take 1 tablet by mouth daily       ondansetron (ZOFRAN ODT) 4 MG ODT tab Take 1-2 tablets (4-8 mg) by mouth every 8 hours as needed for nausea (Patient not taking: Reported on 12/13/2019) 10 tablet 0     propranolol (INDERAL LA) 60 MG 24 hr capsule Take 1 capsule (60 mg) by mouth daily (Patient not taking: Reported on 1/9/2020) 90 capsule 3     REXULTI 0.5 MG tablet TK 1 T PO QD  5     vilazodone (VIIBRYD) 40 MG TABS tablet 60 mg        VITAMIN D, CHOLECALCIFEROL, PO Take 1,000 Units by mouth 2 times daily Vitamin D        No Known Allergies  Recent Labs   Lab Test 12/21/18  1610 10/23/18  0843 10/13/17  1245 02/09/16  0923 04/28/14  1042   *  --   --  94 118   HDL 75  --   --  85 72   TRIG 40  --   --  58 90   ALT 20  --  19  --  31   CR 1.15  --  1.35*  --  1.08   GFRESTIMATED 66  --  53*  --  70   GFRESTBLACK 77  --  64  --  84   POTASSIUM 4.2  --  4.1  --  4.2   TSH  --  1.12   --   --   --         BP Readings from Last 3 Encounters:   01/16/19 138/87   12/21/18 131/79   10/23/18 134/85    Wt Readings from Last 3 Encounters:   01/16/19 56.7 kg (125 lb)   12/21/18 56.7 kg (125 lb)   10/23/18 57.6 kg (127 lb)                      ROS:  Constitutional, HEENT, cardiovascular, pulmonary, GI, , musculoskeletal, neuro, skin, endocrine and psych systems are negative, except as otherwise noted.      Objective    There were no vitals taken for this visit.    Physical Exam     GENERAL: healthy, alert and no distress  Clear speech.   Affect full.   No stridor.   No coughing  Appears well   Was able to visualize neck well via video, no apparent swelling, trachea midline.     When I described what enlarged lymph nodes feel like he feels that is actually the case on the left and probably what ems was referring to.     Diagnostic Test Results:  Labs reviewed in Epic      ASSESSMENT AND PLAN  1. Throat swelling  Overall I suspect related to seasonal allergies.  Early drug reaction could be possibility but unlikely.  I did educate on more overt signs/symptoms of anaphylaxis and to call 911 immediately.  (stridor, wheeze, throat closing feeling, dizziness, tongue/lip swelling.)     Start a non-sedating antihistamine (claritin, zyrtec or allergra generic equivalent) and flonase or nasonex generic equivalent daily over the counter until completes the doxycycline .  This will treat for allergy and seasonal allergies.     Otherwise follow up in fall for yearly physical post covid.     He repeated back instructions and will have this available on [facility]. (he is aware of that.)   2. Seasonal allergic rhinitis due to pollen          Video-Visit Details    Type of service:  Video Visit    Video End Time (time video stopped): 1:56    Originating Location (pt. Location): Home    Distant Location (provider location):  [facility]     Mode of Communication:  Sanjuana    Return in aug/sept      Man  Wegener, MD

## 2020-04-29 NOTE — LETTER
My Depression Action Plan  Name: Marvin Lovettoo   Date of Birth 1955  Date: 4/29/2020    My doctor: Wegener, Joel Daniel Irwin   My clinic: [address] [address] [phone]          GREEN    ZONE   Good Control    What it looks like:     Things are going generally well. You have normal ups and downs. You may even feel depressed from time to time, but bad moods usually last less than a day.   What you need to do:  1. Continue to care for yourself (see self care plan)  2. Check your depression survival kit and update it as needed  3. Follow your physician s recommendations including any medication.  4. Do not stop taking medication unless you consult with your physician first.           YELLOW         ZONE Getting Worse    What it looks like:     Depression is starting to interfere with your life.     It may be hard to get out of bed; you may be starting to isolate yourself from others.    Symptoms of depression are starting to last most all day and this has happened for several days.     You may have suicidal thoughts but they are not constant.   What you need to do:     1. Call your care team. Your response to treatment will improve if you keep your care team informed of your progress. Yellow periods are signs an adjustment may need to be made.     2. Continue your self-care.  Just get dressed and ready for the day.  Don't give yourself time to talk yourself out of it.    3. Talk to someone in your support network.    4. Open up your Depression Self-Care Plan/Wellness Kit.           RED    ZONE Medical Alert - Get Help    What it looks like:     Depression is seriously interfering with your life.     You may experience these or other symptoms: You can t get out of bed most days, can t work or engage in other necessary activities, you have trouble taking care of basic hygiene, or basic responsibilities, thoughts of suicide or death that  will not go away, self-injurious behavior.     What you need to do:  1. Call your care team and request a same-day appointment. If they are not available (weekends or after hours) call your local crisis line, emergency room or 911.            Depression Self-Care Plan / Wellness Kit    Self-Care for Depression  Here s the deal. Your body and mind are really not as separate as most people think.  What you do and think affects how you feel and how you feel influences what you do and think. This means if you do things that people who feel good do, it will help you feel better.  Sometimes this is all it takes.  There is also a place for medication and therapy depending on how severe your depression is, so be sure to consult with your medical provider and/ or Behavioral Health Consultant if your symptoms are worsening or not improving.     In order to better manage my stress, I will:    Exercise  Get some form of exercise, every day. This will help reduce pain and release endorphins, the  feel good  chemicals in your brain. This is almost as good as taking antidepressants!  This is not the same as joining a gym and then never going! (they count on that by the way ) It can be as simple as just going for a walk or doing some gardening, anything that will get you moving.      Hygiene   Maintain good hygiene (get out of bed in the morning, make your bed, brush your teeth, take a shower, and get dressed like you were going to work, even if you are unemployed).  If your clothes don't fit try to get ones that do.    Diet  Strive to eat foods that are good for me, drink plenty of water, and avoid excessive sugar, caffeine, alcohol, and other mood-altering substances.  Some foods that are helpful in depression are: complex carbohydrates, B vitamins, flaxseed, fish or fish oil, fresh fruits and vegetables.    Psychotherapy  Agree to participate in Individual Therapy (if recommended).    Medication  If prescribed medications, I  agree to take them.  Missing doses can result in serious side effects.  I understand that drinking alcohol, or other illicit drug use, may cause potential side effects.  I will not stop my medication abruptly without first discussing it with my provider.    Staying Connected With Others  Stay in touch with my friends, family members, and my primary care provider/team.    Use your imagination  Be creative.  We all have a creative side; it doesn t matter if it s oil painting, sand castles, or mud pies! This will also kick up the endorphins.    Witness Beauty  (AKA stop and smell the roses) Take a look outside, even in mid-winter. Notice colors, textures. Watch the squirrels and birds.     Service to others  Be of service to others.  There is always someone else in need.  By helping others we can  get out of ourselves  and remember the really important things.  This also provides opportunities for practicing all the other parts of the program.    Humor  Laugh and be silly!  Adjust your TV habits for less news and crime-drama and more comedy.    Control your stress  Try breathing deep, massage therapy, biofeedback, and meditation. Find time to relax each day.     Crisis Text Line  http://www.crisistextline.org    The Crisis Text Line serves anyone, in any type of crisis, providing access to free, 24/7 support and information via the medium people already use and trust:    Here's how it works:  1.  Text 814-278 from anywhere in the [place], anytime, about any type of crisis.  2.  A live, trained Crisis Counselor receives the text and responds quickly.  3.  The volunteer Crisis Counselor will help you move from a 'hot moment to a cool moment'.    My support system    Clinic Contact:  Phone number:    Contact 1:  Phone number:    Contact 2:  Phone number:    Latter-day/:  Phone number:    Therapist:  Phone number:    Local crisis center:    Phone number:    Other community support:  Phone number:

## 2020-04-29 NOTE — TELEPHONE ENCOUNTER
"Patient calling reporting he has had some \"tightness in my throat.\" Symptoms have been intermittent in past 4-5 days. Patient reporting symptoms starting again in past 30 minutes. Denies previous history. Reporting both chest and throat tightness \"that is uncomfortable.\" Denies difficulty breathing.    Per guidelines advised 911. Patient agrees to call 911 now.     Corry Matamoros RN  [facility]    COVID 19 Nurse Triage Plan/Patient Instructions    Please be aware that novel coronavirus (COVID-19) may be circulating in the community. If you develop symptoms such as fever, cough, or SOB or if you have concerns about the presence of another infection including coronavirus (COVID-19), please contact your health care provider or visit [email or web address].     Disposition/Instructions    Patient to call EMS/911 and follow protocol based instructions. Follow System Ambulatory Workflow for COVID 19.     Bring Your Own Device:  Please also bring your smart device(s) (smart phones, tablets, laptops) and their charging cables for your personal use and to communicate with your care team during your visit.    Thank you for limiting contact with others, wearing a simple mask to cover your cough, practice good hand hygiene habits and accessing our virtual services where possible to limit the spread of this virus.    For more information about COVID19 and options for caring for yourself at home, please visit the CDC website at https://www.cdc.gov/coronavirus/2019-ncov/about/steps-when-sick.html  For more options for care at [facility], please visit our website at [email or web address]    For more information, please use the [place] Department of Health COVID-19 Website: https://www.health.state.[place]/diseases/coronavirus/index.html  [facility] [facility] Health ([facility]) COVID-19 Hotlines (Interpreters available):      Health questions: Phone Number: [phone] or 1-982.970.5490 and Hours: " 7 a.m. to 7 p.m.    Schools and  questions: Phone Number: [phone] or [phone] and Hours 7 a.m. to 7 p.m.                Reason for Disposition    Tightness in the chest or throat and begins within 2 hours of exposure to allergic substance    Additional Information    Negative: Life-threatening reaction in the past to similar substance (e.g., food, insect bite/sting, medication, etc.) and < 2 hours since exposure    Negative: Wheezing, stridor, hoarseness, or difficulty breathing    Protocols used: [id number]

## 2020-05-06 ENCOUNTER — TELEPHONE (OUTPATIENT)
Dept: DERMATOLOGY | Facility: CLINIC | Age: 65
End: 2020-05-06

## 2020-05-06 NOTE — TELEPHONE ENCOUNTER
Called patient to change visit to telephone with photos. No answer, left voicemail and call back number. Will be sending a IM5 message out as well.

## 2020-05-19 ENCOUNTER — VIRTUAL VISIT (OUTPATIENT)
Dept: DERMATOLOGY | Facility: CLINIC | Age: 65
End: 2020-05-19
Payer: COMMERCIAL

## 2020-05-19 DIAGNOSIS — L73.9 FOLLICULITIS: ICD-10-CM

## 2020-05-19 RX ORDER — LORATADINE 10 MG/1
10 TABLET ORAL DAILY
COMMUNITY
End: 2021-10-23

## 2020-05-19 RX ORDER — DOXYCYCLINE 100 MG/1
100 CAPSULE ORAL DAILY
Qty: 30 CAPSULE | Refills: 2 | Status: SHIPPED | OUTPATIENT
Start: 2020-05-19 | End: 2021-10-21

## 2020-05-19 RX ORDER — FLUTICASONE PROPIONATE 50 MCG
1 SPRAY, SUSPENSION (ML) NASAL DAILY
COMMUNITY
End: 2021-10-21

## 2020-05-19 ASSESSMENT — PAIN SCALES - GENERAL: PAINLEVEL: NO PAIN (0)

## 2020-05-19 NOTE — NURSING NOTE
Dermatology Rooming Note    Marvin Elaine's goals for this visit include:   Chief Complaint   Patient presents with     Derm Problem     Nick is doing a follow up for his itching.      ERIN Alejandra

## 2020-05-19 NOTE — PROGRESS NOTES
Dermatology Phone Visit    Dermatology Problem List:  1. Inflamed SK  -s/p cryotherapy   2. Xerosis cutis  -current t/x: cream based moisturizer  3. AK  -s/p cryotherapy  4. Acne / folliculitis with likely secondary acne excorie.   - current tx: doxycycline 100 mg PO BID --> qdaily, BP 4-5% wash, clindamycin 1% lotion, clindamycin 1% solution  - previous tx: bleach baths, hydrocortisone 0.2% cream    Patient opted to conduct today's return visit via telephone vs an in person visit to the clinic.    Encounter Date: May 19, 2020    Chief complaint:   Chief Complaint   Patient presents with     Derm Problem     Nick is doing a follow up for his itching.      I spoke with: Marvin Elaine    The reason for the telephone visit was:   Folliculitis/acne/acne excoriee    Pertinent history and review of systems:  Patient reports he is much improved from prior, has been essentially clear of acne/folliculitis spots on the scalp, chest/back/arms for the last 4 weeks. He has been taking doxycycline 100 mg PO BID - tolerating well without GI upset but has noticed some photosensitivity. He is using a daily sunscreen. He has been using clindamycin 1% lotion (body) and 1% solution (scalp) a few times a week. He uses benzoyl peroxide wash in the shower 3x weekly. He still notices ongoing pruritus on the scalp, but no active areas of folliculitis. Health otherwise stable. No other skin concerns.     Assessment/Advice/instructions given to patient/guardian including prescriptions, follow up appointment or orders for diagnostic testin. Acne / folliculitis / acne excoriee. Subjectively improved. Recommended decreasing dose of doxycycline with plan to discontinue in 3 months. Reiterated need for topical regimen as long-term maintenance therapy. Patient expressed understanding and agreed to plan.     - Reduce doxycycline from 100 mg PO BID to 100 mg PO qdaily with plan to discontinue at next visit  - Continue clindamycin 1% lotion  (body) and clindamycin 1% solution (scalp) at least 2-3 times weekly  - Continue benzoyl peroxide 4-5% wash in the shower  - Advised to try tar-based shampoo for scalp; consider topical gabapentin at follow-up if still experience pruritus.     Phone call contact time:  Call Started at: 1:56 PM  Call Ended at: 2:09 PM    Staff only:    Harpal Dalal MD  Pronouns: he/him/his    Department of Dermatology  [facility]: Phone: [phone], Fax:[phone]  [facility]: Phone: [phone] Fax: [phone]

## 2020-05-19 NOTE — PATIENT INSTRUCTIONS
[facility] Teledermatology Visit    Thank you for allowing us to participate in your care. Your findings, instructions and follow-up plan are as follows:    Acne / folliculitis    1. Reduce doxycycline from 100 mg twice daily to 100 mg once daily for additional 3 months.   2. Continue topical therapies.   3. For scalp, can try tar-based shampoo likely Neutrogena T-gel at least 2 times weekly.   4. Follow-up in 3 months.     When should I call my doctor?    If you are worsening or not improving, please, contact us or seek urgent care as noted below.     Who should I call with questions (adults)?    [facility] (adult and pediatric): [phone]     [facility] (adult): [phone]    For urgent needs outside of business hours call the [facility] at [phone] and ask for the dermatology resident on call    If this is a medical emergency and you are unable to reach an ER, Call [phone]      Who should I call with questions (pediatric)?  [facility]- Pediatric Dermatology  Dr. Joan Walsh, Dr. Scotty Aldana, Dr. Deidra France, Rhina Jordan, PA  Dr. Brandie Wiggins, Dr. Elisabeth Boyce & Dr. Steve Berger  Non Urgent  Nurse Triage Line; [phone]- Melly and Jelena ZAMAN Care Coordinators   Kristy (/Complex ) [phone]    If you need a prescription refill, please contact your pharmacy. Refills are approved or denied by our Physicians during normal business hours, Monday through Fridays  Per office policy, refills will not be granted if you have not been seen within the past year (or sooner depending on your child's condition)    Scheduling Information:  Pediatric Appointment Scheduling and Call Center [phone]  Radiology Scheduling- [phone]  Sedation Unit Scheduling- [phone]  [place] Scheduling- General [phone]; Pediatric Dermatology  [phone]  Main  Services: [phone]  Yoruba: [phone]  Egyptian: [phone]  Hmong/Albanian/Alonso: [phone]  Preadmission Nursing Department Fax Number: [phone] (Fax all pre-operative paperwork to this number)    For urgent matters arising during evenings, weekends, or holidays that cannot wait for normal business hours please call [phone] and ask for the Dermatology Resident On-Call to be paged.

## 2020-08-19 ENCOUNTER — TELEPHONE (OUTPATIENT)
Dept: OTOLARYNGOLOGY | Facility: CLINIC | Age: 65
End: 2020-08-19

## 2020-08-19 NOTE — TELEPHONE ENCOUNTER
Health Call Center    Phone Message    May a detailed message be left on voicemail: yes     Reason for Call: Patient called stating that he is a Dr. Chaudhari patient and patient would like to be [facility]. [facility] won't let him schedule with them since he would need the okay from Dr. Palacios to be seen there instead of here since it is closer to his house. Please advise. Thank you.    Action Taken: Message routed to:  Adult Clinics: Endocrinology p [phone]    Travel Screening: Not Applicable

## 2020-08-20 ENCOUNTER — VIRTUAL VISIT (OUTPATIENT)
Dept: DERMATOLOGY | Facility: CLINIC | Age: 65
End: 2020-08-20
Payer: COMMERCIAL

## 2020-08-20 DIAGNOSIS — L73.9 FOLLICULITIS: Primary | ICD-10-CM

## 2020-08-20 RX ORDER — CLINDAMYCIN PHOSPHATE 10 UG/ML
LOTION TOPICAL
Qty: 60 ML | Refills: 11 | Status: SHIPPED | OUTPATIENT
Start: 2020-08-20

## 2020-08-20 NOTE — PATIENT INSTRUCTIONS
[facility] Dermatology Visit    Thank you for allowing us to participate in your care. Your findings, instructions and follow-up plan are as follows:    Folliculitis.     1. Discontinue doxycycline.   2. Continue using benzoyl peroxide wash in the shower.   3. Start using clindamycin 1% lotion (switched from solution) once daily.   4. Follow-up in 2 months.     When should I call my doctor?    If you are worsening or not improving, please, contact us or seek urgent care as noted below.     Who should I call with questions (adults)?    [facility] (adult and pediatric): [phone]     [facility] (adult): [phone]    For urgent needs outside of business hours call the [facility] at [phone] and ask for the dermatology resident on call    If this is a medical emergency and you are unable to reach an ER, Call 911      Who should I call with questions (pediatric)?  [facility]- Pediatric Dermatology  Dr. Joan Walsh, Dr. Scotty Aldana, Dr. Deidra France, Rhina Jordan, PA  Dr. Brandie Wiggins, Dr. Elisabeth Boyce & Dr. Steve Berger  Non Urgent  Nurse Triage Line; [phone]- Melly and Jelena ZAMAN Care Coordinators   Kristy (/Complex ) [phone]    If you need a prescription refill, please contact your pharmacy. Refills are approved or denied by our Physicians during normal business hours, Monday through Fridays  Per office policy, refills will not be granted if you have not been seen within the past year (or sooner depending on your child's condition)    Scheduling Information:  Pediatric Appointment Scheduling and Call Center [phone]  Radiology Scheduling- [phone]  Sedation Unit Scheduling- [phone]  [place] Scheduling- General [phone]; Pediatric Dermatology [phone]  Main  Services: [phone]  Gambian:  [phone]  P[place]ua [place]an: [phone]  Ezequiel/Greenlandic/Alonso: [phone]  Preadmission Nursing Department Fax Number: [phone] (Fax all pre-operative paperwork to this number)    For urgent matters arising during evenings, weekends, or holidays that cannot wait for normal business hours please call [phone] and ask for the Dermatology Resident On-Call to be paged.

## 2020-08-20 NOTE — LETTER
8/20/2020       RE: Marvin Elaine  [address]  [place] [address]     Dear Colleague,    Thank you for referring your patient, Marvin Elaine, to the [facility] DERMATOLOGY at [facility]. Please see a copy of my visit note below.    [facility] Dermatology Record:  Store and Forward and Telephone [phone]      Dermatology Problem List:  1. Inflamed SK  -s/p cryotherapy   2. Xerosis cutis  -current t/x: cream based moisturizer  3. AK  -s/p cryotherapy  4. Acne / folliculitis with likely secondary acne excorie.   - current tx: BP 4-5% wash, clindamycin 1% lotion, clindamycin 1% solution  - previous tx:doxycycline 100 mg PO BID/qdaily x 6 months (improvement),  bleach baths, hydrocortisone 0.2% cream    Encounter Date: Aug 20, 2020    CC:   Chief Complaint   Patient presents with     Derm Problem     Nick is here for a 3 month follow up of folliculitis     History of Present Illness:  Marvin Elaine is a 64 year old male who presents as follow-up for acne / folliculitis with acne excorie. Last seen 3 months ago when doxycycline was decreased from 100 mg twice daily to 100 mg once daily and continued on topical therapies. Today, . Finished doxycycline 100 mg PO qdaily about 2 days ago. Continues using clindamycin 1% solution on the scalp and BP 4% wash in the shower three times weekly. He states he would like to switch to clindamycin 1% lotion if possible, as he feels as though he has trouble applying the drops to his posterior neck in particular. Has been prescribed this in the past, but he was not able to find a tube of this at home. He still gets occasionally new spots, including few active areas on the shoulder and posterior neck, but overall satisfied with treatment. Health otherwise stable. No other skin concerns.     ROS: Patient is generally feeling well today.     Physical Examination:  General: Well-appearing male, appropriately-developed  individual.  Skin: Focused examination including face, chest and back was performed.   - Few follicular based pink papules/pustules on the central posterior neck and right shoulder, but overall improved from prior.   - No other lesions of concern on areas examined.              Labs:  None.     Past Medical History:   Patient Active Problem List   Diagnosis     Anxiety     Persistent depressive disorder     CARDIOVASCULAR SCREENING; LDL GOAL LESS THAN 160     Chronic pelvic pain in male     Chronic pain of right knee     Osteochondritis     Chronic midline low back pain without sciatica     Chronic bilateral low back pain without sciatica     Encounter for general adult medical examination with abnormal findings     Past Medical History:   Diagnosis Date     Anxiety      Depressive disorder      Prostate infection      Past Surgical History:   Procedure Laterality Date     LAPAROSCOPIC APPENDECTOMY  1/21/2012    Procedure:LAPAROSCOPIC APPENDECTOMY; Open Appendectomy; Surgeon:GERDA GASTON; Location:[facility] OR       Social History:  Patient reports that he has never smoked. He has never used smokeless tobacco. He reports that he does not drink alcohol or use drugs.    Family History:  Family History   Problem Relation Age of Onset     Respiratory Mother         copd     Psychotic Disorder Mother      C.A.D. Father      Psychotic Disorder Father         depression, anxiety, alcohol abuse     Prostate Cancer No family hx of      Cancer - colorectal No family hx of      Diabetes No family hx of      Skin Cancer No family hx of        Medications:  Current Outpatient Medications   Medication     clindamycin 1 % EX external solution     latanoprost (XALATAN) 0.005 % ophthalmic solution     multivitamin, therapeutic with minerals (MULTI-VITAMIN) TABS     PARoxetine (PAXIL) 30 MG tablet     VITAMIN D, CHOLECALCIFEROL, PO     ALPRAZOLAM PO     ASPIRIN PO     buPROPion (WELLBUTRIN) 100 MG tablet     clindamycin 1 %  EX external lotion     doxycycline monohydrate (MONODOX) 100 MG capsule     fluticasone (FLONASE) 50 MCG/ACT nasal spray     hydrocortisone (WESTCORT) 0.2 % external cream     ibuprofen (ADVIL/MOTRIN) 200 MG tablet     loratadine (CLARITIN) 10 MG tablet     ondansetron (ZOFRAN ODT) 4 MG ODT tab     propranolol (INDERAL LA) 60 MG 24 hr capsule     REXULTI 0.5 MG tablet     vilazodone (VIIBRYD) 40 MG TABS tablet     No current facility-administered medications for this visit.           No Known Allergies    Impression and Recommendations (Patient Counseled on the Following):    1. Acne/ folliculitis +/- acne excoriee. Improved significantly with oral doxycycline and topical therapies. Recommended continuing topical therapies, including switch from solution to lotion per patient preference for clindamycin. Will arrange follow-up in 2 months. If flaring, would consider isotretinoin at that point.   - Stop doxycycline  - Switch from clindamycin 1% solution to lotion qdaily  - Continue BP 4% wash in the shower daily  - Follow-up in 8 weeks    Follow-up:   Follow-up with dermatology in approximately 8 weeks. Earlier for new or changing lesions or rash.      Staff only    Harpal Dalal MD  Pronouns: he/him/his    Department of Dermatology  [facility]: Phone: [phone], Fax:[phone]  [facility]: Phone: [phone] Fax: [phone]    _____________________________________________________________________________    Teledermatology information:  - Location of patient: [place]  - Patient presented as: return  - Location of teledermatologist:  ([facility] DERMATOLOGY )  - Reason teledermatology is appropriate:  of National Emergency Regarding Coronavirus disease (COVID 19) Outbreak  - Image quality and interpretability: acceptable  - Physician has received verbal consent for a Video/Photos Visit  from the patient? Yes  - In-person dermatology visit recommendation: no  - Date of images: 8/20/2020  - Service start time:11:30 AM  - Service end time:11:45 AM  - Date of report: 8/20/2020     Again, thank you for allowing me to participate in the care of your patient.      Sincerely,    Harpal Dalal MD

## 2020-08-20 NOTE — TELEPHONE ENCOUNTER
Returned phone call to pt. Asked pt to call back with reason for visit so he could be referred to the appropriate provider at the [facility].  Clinic phone number provided.  Corry Bartlett RN

## 2020-08-20 NOTE — PROGRESS NOTES
[facility] Dermatology Record:  Store and Forward and Telephone [phone]      Dermatology Problem List:  1. Inflamed SK  -s/p cryotherapy   2. Xerosis cutis  -current t/x: cream based moisturizer  3. AK  -s/p cryotherapy  4. Acne / folliculitis with likely secondary acne excorie.   - current tx: BP 4-5% wash, clindamycin 1% lotion, clindamycin 1% solution  - previous tx:doxycycline 100 mg PO BID/qdaily x 6 months (improvement),  bleach baths, hydrocortisone 0.2% cream    Encounter Date: Aug 20, 2020    CC:   Chief Complaint   Patient presents with     Derm Problem     Nick is here for a 3 month follow up of folliculitis     History of Present Illness:  Marvin Elaine is a 64 year old male who presents as follow-up for acne / folliculitis with acne excorie. Last seen 3 months ago when doxycycline was decreased from 100 mg twice daily to 100 mg once daily and continued on topical therapies. Today, . Finished doxycycline 100 mg PO qdaily about 2 days ago. Continues using clindamycin 1% solution on the scalp and BP 4% wash in the shower three times weekly. He states he would like to switch to clindamycin 1% lotion if possible, as he feels as though he has trouble applying the drops to his posterior neck in particular. Has been prescribed this in the past, but he was not able to find a tube of this at home. He still gets occasionally new spots, including few active areas on the shoulder and posterior neck, but overall satisfied with treatment. Health otherwise stable. No other skin concerns.     ROS: Patient is generally feeling well today.     Physical Examination:  General: Well-appearing male, appropriately-developed individual.  Skin: Focused examination including face, chest and back was performed.   - Few follicular based pink papules/pustules on the central posterior neck and right shoulder, but overall improved from prior.   - No other lesions of concern on areas examined.              Labs:  None.     Past  Medical History:   Patient Active Problem List   Diagnosis     Anxiety     Persistent depressive disorder     CARDIOVASCULAR SCREENING; LDL GOAL LESS THAN 160     Chronic pelvic pain in male     Chronic pain of right knee     Osteochondritis     Chronic midline low back pain without sciatica     Chronic bilateral low back pain without sciatica     Encounter for general adult medical examination with abnormal findings     Past Medical History:   Diagnosis Date     Anxiety      Depressive disorder      Prostate infection      Past Surgical History:   Procedure Laterality Date     LAPAROSCOPIC APPENDECTOMY  1/21/2012    Procedure:LAPAROSCOPIC APPENDECTOMY; Open Appendectomy; Surgeon:GERDA GASTON; Location: OR       Social History:  Patient reports that he has never smoked. He has never used smokeless tobacco. He reports that he does not drink alcohol or use drugs.    Family History:  Family History   Problem Relation Age of Onset     Respiratory Mother         copd     Psychotic Disorder Mother      C.A.D. Father      Psychotic Disorder Father         depression, anxiety, alcohol abuse     Prostate Cancer No family hx of      Cancer - colorectal No family hx of      Diabetes No family hx of      Skin Cancer No family hx of        Medications:  Current Outpatient Medications   Medication     clindamycin 1 % EX external solution     latanoprost (XALATAN) 0.005 % ophthalmic solution     multivitamin, therapeutic with minerals (MULTI-VITAMIN) TABS     PARoxetine (PAXIL) 30 MG tablet     VITAMIN D, CHOLECALCIFEROL, PO     ALPRAZOLAM PO     ASPIRIN PO     buPROPion (WELLBUTRIN) 100 MG tablet     clindamycin 1 % EX external lotion     doxycycline monohydrate (MONODOX) 100 MG capsule     fluticasone (FLONASE) 50 MCG/ACT nasal spray     hydrocortisone (WESTCORT) 0.2 % external cream     ibuprofen (ADVIL/MOTRIN) 200 MG tablet     loratadine (CLARITIN) 10 MG tablet     ondansetron (ZOFRAN ODT) 4 MG ODT tab      propranolol (INDERAL LA) 60 MG 24 hr capsule     REXULTI 0.5 MG tablet     vilazodone (VIIBRYD) 40 MG TABS tablet     No current facility-administered medications for this visit.           No Known Allergies    Impression and Recommendations (Patient Counseled on the Following):    1. Acne/ folliculitis +/- acne excoriee. Improved significantly with oral doxycycline and topical therapies. Recommended continuing topical therapies, including switch from solution to lotion per patient preference for clindamycin. Will arrange follow-up in 2 months. If flaring, would consider isotretinoin at that point.   - Stop doxycycline  - Switch from clindamycin 1% solution to lotion qdaily  - Continue BP 4% wash in the shower daily  - Follow-up in 8 weeks    Follow-up:   Follow-up with dermatology in approximately 8 weeks. Earlier for new or changing lesions or rash.      Staff only    Harpal Dalal MD  Pronouns: he/him/his    Department of Dermatology  [facility]: Phone: [phone], Fax:[phone]  [facility]: Phone: [phone] Fax: [phone]    _____________________________________________________________________________    Teledermatology information:  - Location of patient: [place]  - Patient presented as: return  - Location of teledermatologist:  ([facility] DERMATOLOGY )  - Reason teledermatology is appropriate:  of National Emergency Regarding Coronavirus disease (COVID 19) Outbreak  - Image quality and interpretability: acceptable  - Physician has received verbal consent for a Video/Photos Visit from the patient? Yes  - In-person dermatology visit recommendation: no  - Date of images: 8/20/2020  - Service start time:11:30 AM  - Service end time:11:45 AM  - Date of report: 8/20/2020

## 2020-08-21 NOTE — TELEPHONE ENCOUNTER
Phone call received from pt stating he was not referred to a specific provider.  He is a hearing aid wearer and needs an audiology appointment.  Recommended he contact the [facility] clinic to proceed with scheduling. Corry Bartlett RN

## 2020-10-21 ENCOUNTER — VIRTUAL VISIT (OUTPATIENT)
Dept: DERMATOLOGY | Facility: CLINIC | Age: 65
End: 2020-10-21
Payer: COMMERCIAL

## 2020-10-21 DIAGNOSIS — L70.0 ACNE VULGARIS: ICD-10-CM

## 2020-10-21 DIAGNOSIS — L28.1 PRURIGO NODULARIS: Primary | ICD-10-CM

## 2020-10-21 DIAGNOSIS — L73.9 FOLLICULITIS: ICD-10-CM

## 2020-10-21 PROCEDURE — 99213 OFFICE O/P EST LOW 20 MIN: CPT | Mod: GQ | Performed by: DERMATOLOGY

## 2020-10-21 RX ORDER — FLUOCINONIDE 0.5 MG/G
OINTMENT TOPICAL
Qty: 30 G | Refills: 11 | Status: SHIPPED | OUTPATIENT
Start: 2020-10-21 | End: 2021-03-03

## 2020-10-21 ASSESSMENT — PAIN SCALES - GENERAL: PAINLEVEL: NO PAIN (0)

## 2020-10-21 NOTE — PATIENT INSTRUCTIONS
[facility] Dermatology Visit    Thank you for allowing us to participate in your care. Your findings, instructions and follow-up plan are as follows:    Acne/folliculitis.   1. Continue topical therapies including clindamycin and benzoyl peroxide wash.   2. Can use lidex 0.05% ointment twice daily as needed for active spots.     When should I call my doctor?    If you are worsening or not improving, please, contact us or seek urgent care as noted below.     Who should I call with questions (adults)?    [facility] (adult and pediatric): [phone]     [facility] (adult): [phone]    For urgent needs outside of business hours call the [facility] at [phone] and ask for the dermatology resident on call    If this is a medical emergency and you are unable to reach an ER, Call [phone]      Who should I call with questions (pediatric)?  [facility]- Pediatric Dermatology  Dr. Joan Walsh, Dr. Scotty Aldana, Dr. Deidra France, Rhina Jordan, PRINCESS Wiggins, Dr. Elisabeth Boyce & Dr. Steve Berger  Non Urgent  Nurse Triage Line; [phone]- Melly and Jelena RN Care Coordinators   Kristy (/Complex ) [phone]    If you need a prescription refill, please contact your pharmacy. Refills are approved or denied by our Physicians during normal business hours, Monday through Fridays  Per office policy, refills will not be granted if you have not been seen within the past year (or sooner depending on your child's condition)    Scheduling Information:  Pediatric Appointment Scheduling and Call Center [phone]  Radiology Scheduling- [phone]  Sedation Unit Scheduling- [phone]  [place] Scheduling- General [phone]; Pediatric Dermatology [phone]  Main  Services: [phone]  Vietnamese: [phone]  [place]:  [phone]  Ezequiel/Torin/Peruvian: [phone]  Preadmission Nursing Department Fax Number: [phone] (Fax all pre-operative paperwork to this number)    For urgent matters arising during evenings, weekends, or holidays that cannot wait for normal business hours please call [phone] and ask for the Dermatology Resident On-Call to be paged.

## 2020-10-21 NOTE — NURSING NOTE
Dermatology Rooming Note    Marvin Elaine's goals for this visit include:   Chief Complaint   Patient presents with     Derm Problem     Folliculitis - Nick states he has been stable      Isaac Ward, CMA

## 2020-10-21 NOTE — PROGRESS NOTES
[facility] Dermatology Record:  Store and Forward and Telephone [phone]      Dermatology Problem List:  1. Inflamed SK  -s/p cryotherapy   2. Xerosis cutis  -current t/x: cream based moisturizer  3. AKs. LiqN2.   4. Acne / folliculitis with likely secondary acne excorie.   - current tx: BP 4-5% wash, clindamycin 1% lotion, clindamycin 1% solution  - previous tx:doxycycline 100 mg PO BID/qdaily x 6 months (improvement),  bleach baths, hydrocortisone 0.2% cream    Encounter Date: Oct 21, 2020    CC:   Chief Complaint   Patient presents with     Derm Problem     Folliculitis - Nick states he has been stable        History of Present Illness:  aMrvin Elaine is a 65 year old male who presents as follow-up for acne/folliculitis +/- acne excorie. Last seen about 2 months ago when doxycycline was stopped and continued on topical therapies only including clindamycin 1% lotion and BP wash.     Today, he reports that his folliculitis is much improved from prior. Still does get occasional new spots, though overall much better than prior. He has been using clindamycin 1% lotion and benzoyl peroxide wash. He also recently has been spot treating areas with CBD oil which a friend from [place] gifted to him. He has prior used westcort 0.2% ointment for active areas though would like to know if they are other options. Health otherwise stable. No other skin concerns.     ROS: Patient is generally feeling well today.     Physical Examination:  General: Well-appearing male, appropriately-developed individual.  Skin: Focused examination including shoulder was performed.   - Mild follicular accentuation, but no active papules or pustules appreciated.         Labs:  None.     Past Medical History:   Patient Active Problem List   Diagnosis     Anxiety     Persistent depressive disorder     CARDIOVASCULAR SCREENING; LDL GOAL LESS THAN 160     Chronic pelvic pain in male     Chronic pain of right knee     Osteochondritis     Chronic  midline low back pain without sciatica     Chronic bilateral low back pain without sciatica     Encounter for general adult medical examination with abnormal findings     Past Medical History:   Diagnosis Date     Anxiety      Depressive disorder      Prostate infection      Past Surgical History:   Procedure Laterality Date     LAPAROSCOPIC APPENDECTOMY  1/21/2012    Procedure:LAPAROSCOPIC APPENDECTOMY; Open Appendectomy; Surgeon:GERDA GASTON; Location:[facility] OR     Social History:  Patient reports that he has never smoked. He has never used smokeless tobacco. He reports that he does not drink alcohol or use drugs.    Family History:  Family History   Problem Relation Age of Onset     Respiratory Mother         copd     Psychotic Disorder Mother      C.A.D. Father      Psychotic Disorder Father         depression, anxiety, alcohol abuse     Prostate Cancer No family hx of      Cancer - colorectal No family hx of      Diabetes No family hx of      Skin Cancer No family hx of        Medications:  Current Outpatient Medications   Medication     clindamycin (CLEOCIN T) 1 % external lotion     clindamycin 1 % EX external lotion     clindamycin 1 % EX external solution     hydrocortisone (WESTCORT) 0.2 % external cream     latanoprost (XALATAN) 0.005 % ophthalmic solution     multivitamin, therapeutic with minerals (MULTI-VITAMIN) TABS     PARoxetine (PAXIL) 30 MG tablet     VITAMIN D, CHOLECALCIFEROL, PO     ALPRAZOLAM PO     ASPIRIN PO     buPROPion (WELLBUTRIN) 100 MG tablet     doxycycline monohydrate (MONODOX) 100 MG capsule     fluticasone (FLONASE) 50 MCG/ACT nasal spray     ibuprofen (ADVIL/MOTRIN) 200 MG tablet     loratadine (CLARITIN) 10 MG tablet     ondansetron (ZOFRAN ODT) 4 MG ODT tab     propranolol (INDERAL LA) 60 MG 24 hr capsule     REXULTI 0.5 MG tablet     vilazodone (VIIBRYD) 40 MG TABS tablet     No current facility-administered medications for this visit.           No Known  Allergies    Impression and Recommendations (Patient Counseled on the Following):    1. Acne vulgaris / folliculitis +/- acne excorie / prurigo nodularis. Improved after course of oral doxycycline and continued use of topical therapies.   - Continue clindamycin 1% lotion and solution, BP 4-5% wash  - Start lidex 0.05% ointment BID PRN active areas only as spot treatment    2. Hx AKs. Follow-up in 3 months in-person for FBSE.     Follow-up:   Follow-up with dermatology in approximately 3 months. Earlier for new or changing lesions or rash.      Staff only    Harpal Dalal MD  Pronouns: he/him/his    Department of Dermatology  [facility]: Phone: [phone], Fax:[phone]  [facility]: Phone: [phone] Fax: [phone]    _____________________________________________________________________________    Teledermatology information:  - Location of patient: [place]  - Patient presented as: return  - Location of teledermatologist:  ([facility] [place] )  - Reason teledermatology is appropriate:  of National Emergency Regarding Coronavirus disease (COVID 19) Outbreak  - Image quality and interpretability: acceptable  - Physician has received verbal consent for a Video/Photos Visit from the patient? YES  - In-person dermatology visit recommendation: no  - Date of images: 10/21/2020  - Service start time: 1:15 PM  - Service end time:1:30 PM  - Date of report: 10/21/2020

## 2020-10-21 NOTE — LETTER
10/21/2020       RE: Marvin Elaine  [address]  [place] [address]     Dear Colleague,    Thank you for referring your patient, Marvin Elaine, to the [facility]ERMATOLOGY [facility] [place] at [facility]. Please see a copy of my visit note below.    [facility] Dermatology Record:  Store and Forward and Telephone [phone]      Dermatology Problem List:  1. Inflamed SK  -s/p cryotherapy   2. Xerosis cutis  -current t/x: cream based moisturizer  3. AKs. LiqN2.   4. Acne / folliculitis with likely secondary acne excorie.   - current tx: BP 4-5% wash, clindamycin 1% lotion, clindamycin 1% solution  - previous tx:doxycycline 100 mg PO BID/qdaily x 6 months (improvement),  bleach baths, hydrocortisone 0.2% cream    Encounter Date: Oct 21, 2020    CC:   Chief Complaint   Patient presents with     Derm Problem     Folliculitis - Nick states he has been stable        History of Present Illness:  Marvin Elaine is a 65 year old male who presents as follow-up for acne/folliculitis +/- acne excorie. Last seen about 2 months ago when doxycycline was stopped and continued on topical therapies only including clindamycin 1% lotion and BP wash.     Today, he reports that his folliculitis is much improved from prior. Still does get occasional new spots, though overall much better than prior. He has been using clindamycin 1% lotion and benzoyl peroxide wash. He also recently has been spot treating areas with CBD oil which a friend from [place] gifted to him. He has prior used westcort 0.2% ointment for active areas though would like to know if they are other options. Health otherwise stable. No other skin concerns.     ROS: Patient is generally feeling well today.     Physical Examination:  General: Well-appearing male, appropriately-developed individual.  Skin: Focused examination including shoulder was performed.   - Mild follicular accentuation, but no active  papules or pustules appreciated.         Labs:  None.     Past Medical History:   Patient Active Problem List   Diagnosis     Anxiety     Persistent depressive disorder     CARDIOVASCULAR SCREENING; LDL GOAL LESS THAN 160     Chronic pelvic pain in male     Chronic pain of right knee     Osteochondritis     Chronic midline low back pain without sciatica     Chronic bilateral low back pain without sciatica     Encounter for general adult medical examination with abnormal findings     Past Medical History:   Diagnosis Date     Anxiety      Depressive disorder      Prostate infection      Past Surgical History:   Procedure Laterality Date     LAPAROSCOPIC APPENDECTOMY  1/21/2012    Procedure:LAPAROSCOPIC APPENDECTOMY; Open Appendectomy; Surgeon:GERDA GASTON; Location: OR     Social History:  Patient reports that he has never smoked. He has never used smokeless tobacco. He reports that he does not drink alcohol or use drugs.    Family History:  Family History   Problem Relation Age of Onset     Respiratory Mother         copd     Psychotic Disorder Mother      C.A.D. Father      Psychotic Disorder Father         depression, anxiety, alcohol abuse     Prostate Cancer No family hx of      Cancer - colorectal No family hx of      Diabetes No family hx of      Skin Cancer No family hx of        Medications:  Current Outpatient Medications   Medication     clindamycin (CLEOCIN T) 1 % external lotion     clindamycin 1 % EX external lotion     clindamycin 1 % EX external solution     hydrocortisone (WESTCORT) 0.2 % external cream     latanoprost (XALATAN) 0.005 % ophthalmic solution     multivitamin, therapeutic with minerals (MULTI-VITAMIN) TABS     PARoxetine (PAXIL) 30 MG tablet     VITAMIN D, CHOLECALCIFEROL, PO     ALPRAZOLAM PO     ASPIRIN PO     buPROPion (WELLBUTRIN) 100 MG tablet     doxycycline monohydrate (MONODOX) 100 MG capsule     fluticasone (FLONASE) 50 MCG/ACT nasal spray     ibuprofen  (ADVIL/MOTRIN) 200 MG tablet     loratadine (CLARITIN) 10 MG tablet     ondansetron (ZOFRAN ODT) 4 MG ODT tab     propranolol (INDERAL LA) 60 MG 24 hr capsule     REXULTI 0.5 MG tablet     vilazodone (VIIBRYD) 40 MG TABS tablet     No current facility-administered medications for this visit.           No Known Allergies    Impression and Recommendations (Patient Counseled on the Following):    1. Acne vulgaris / folliculitis +/- acne excorie / prurigo nodularis. Improved after course of oral doxycycline and continued use of topical therapies.   - Continue clindamycin 1% lotion and solution, BP 4-5% wash  - Start lidex 0.05% ointment BID PRN active areas only as spot treatment    2. Hx AKs. Follow-up in 3 months in-person for FBSE.     Follow-up:   Follow-up with dermatology in approximately 3 months. Earlier for new or changing lesions or rash.      Staff only    Harpal Dalal MD  Pronouns: he/him/his    Department of Dermatology  [facility]: Phone: [phone], Fax:[phone]  [facility]: Phone: [phone] Fax: [phone]    _____________________________________________________________________________    Teledermatology information:  - Location of patient: [place]  - Patient presented as: return  - Location of teledermatologist:  ([facility]ERMAT[facility] [place] )  - Reason teledermatology is appropriate:  of National Emergency Regarding Coronavirus disease (COVID 19) Outbreak  - Image quality and interpretability: acceptable  - Physician has received verbal consent for a Video/Photos Visit from the patient? YES  - In-person dermatology visit recommendation: no  - Date of images: 10/21/2020  - Service start time: 1:15 PM  - Service end time:1:30 PM  - Date of report: 10/21/2020

## 2020-11-14 ENCOUNTER — HEALTH MAINTENANCE LETTER (OUTPATIENT)
Age: 65
End: 2020-11-14

## 2020-11-17 ENCOUNTER — VIRTUAL VISIT (OUTPATIENT)
Dept: FAMILY MEDICINE | Facility: OTHER | Age: 65
End: 2020-11-17

## 2020-11-18 ENCOUNTER — THERAPY VISIT (OUTPATIENT)
Dept: PHYSICAL THERAPY | Facility: CLINIC | Age: 65
End: 2020-11-18
Payer: COMMERCIAL

## 2020-11-18 DIAGNOSIS — M25.511 SHOULDER PAIN, RIGHT: ICD-10-CM

## 2020-11-18 PROBLEM — G89.29 CHRONIC BILATERAL LOW BACK PAIN WITHOUT SCIATICA: Status: RESOLVED | Noted: 2017-01-27 | Resolved: 2020-11-18

## 2020-11-18 PROBLEM — M54.50 CHRONIC BILATERAL LOW BACK PAIN WITHOUT SCIATICA: Status: RESOLVED | Noted: 2017-01-27 | Resolved: 2020-11-18

## 2020-11-18 PROCEDURE — 97161 PT EVAL LOW COMPLEX 20 MIN: CPT | Mod: GP | Performed by: PHYSICAL THERAPIST

## 2020-11-18 PROCEDURE — 97110 THERAPEUTIC EXERCISES: CPT | Mod: GP | Performed by: PHYSICAL THERAPIST

## 2020-11-18 NOTE — PROGRESS NOTES
"Date: 2020 21:00:43  Clinician: Sunita Read  Clinician NPI: [id number]  Patient: Marvin Elaine  Patient : 1955  Patient Address: [address] [place], [place] [id number]  Patient Phone: [phone]  Visit Protocol: URI  Patient Summary:  Marvin is a 65 year old ( : 1955 ) male who initiated a [facility] Visit for COVID-19 (Coronavirus) evaluation and screening. When asked the question \"Please sign me up to receive news, health information and promotions. \", Marvin responded \"No\".    When asked when his symptoms started, Marvin reported that he does not have any symptoms.   He denies having recent facial or sinus surgery in the past 60 days.    Pertinent COVID-19 (Coronavirus) information  Marvin does not work or volunteer as healthcare worker or a . In the past 14 days, Marvin has not worked or volunteered at a healthcare facility or group living setting.   In the past 14 days, he also has not lived in a congregate living setting.   Marvin has had a close contact with a laboratory-confirmed COVID-19 patient in the last 14 days. He was not exposed at his work. He does not know when he was exposed to the laboratory-confirmed COVID-19 patient.   Additional information about contact with COVID-19 (Coronavirus) patient as reported by the patient (free text): The patient is a neighbor. She stood on my walkway, at least 6 feet away, while I sat in my front steps (below her); we chatted and I petted her dog for about 20 minutes. This took place about 10 days ago. She was dx'd a few days later and then alerted me a few days after that. Her sx are mild. I have no sx. Can I go to my PT visit at 7.30 Wednesday morning? Thanks. Nick Wong is not living in the same household with the COVID-19 positive patient. He was not in an enclosed space for greater than 15 minutes with the COVID-19 patient.   Since 2019, Marvin has not been tested for COVID-19 and has not had " upper respiratory infection or influenza-like illness.   Pertinent medical history  Marvin has taken an antibiotic medication in the past month. Antibiotic details as reported by the patient (free text): Clindamycin topical   Marvin does not need a return to work/school note.   Weight: 120 lbs   Marvin does not smoke or use smokeless tobacco.   Weight: 120 lbs    MEDICATIONS: latanoprost (PF) ophthalmic (eye), fluocinonide-emollient topical, clindamycin-benzoyl peroxide topical, paroxetine oral, ALLERGIES: NKDA  Clinician Response:  Dear Marvin,   Based on the information you have provided, you should be able to go to your PT appointment as scheduled.&nbsp; As you have do not have symptoms of Covid-19 and were outside at least six feet away from your neighbor, you do not need to be tested at this time.  In the future, if you develop symptoms of Covid-19 or you personally spend at least 15 minutes within 6 feet of someone who has a positive Covid-19 test, please contact your primary doctor or do another [facility] visit.      Also, please redo an Care visit or call your clinic if you think we missed needed information, your exposure information has changed, or you develop symptoms.        Where can I get more information?  &nbsp;&nbsp;&nbsp;&nbsp;&nbsp; * [facility].mn.[facility] -- About COVID-19:&nbsp;[email or web address]  CDC -- What to Do If You're Sick:&nbsp;www.cdc.gov/coronavirus/2019-ncov/about/steps-when-sick.html  CDC -- Ending Home Isolation:&nbsp;www.cdc.gov/coronavirus/2019-ncov/hcp/disposition-in-home-patients.html  CDC -- Caring for Someone:&nbsp;www.cdc.gov/coronavirus/2019-ncov/if-you-are-sick/care-for-someone.html  [facility] -- Interim Guidance for Hospital Discharge to Home:&nbsp;www.[facility]./diseases/coronavirus/hcp/hospdischarge.pdf  [facility] clinical trials (COVID-19 research  studies):&nbsp;clinicalaffairs.[facility]-clinical-trials  Below are the COVID-19 hotlines at the [place] Department of [facility] ([facility]). Interpreters are available.   For health questions: Call 757-595-8958 or 1-253.723.1435 (7 a.m. to 7 p.m.) For questions about schools and childcare: Call 055-888-7263 or 1-462.894.1402 (7 a.m. to 7 p.m.)          Diagnosis: Contact with and (suspected) exposure to other viral communicable diseases  Diagnosis ICD: Z20.828

## 2020-11-18 NOTE — PROGRESS NOTES
[facility] Initial Evaluation  Subjective:    Patient Health History  Marvin Elaine being seen for Right upper arm pain.       Problem occurred: Bike riding   Pain is reported as 1/10 on pain scale.  General health as reported by patient is excellent.       Medical allergies: none.   Surgeries include:  Other. Other surgery history details: Appendicitis.    Current medications:  Anti-depressants and anti-inflammatory.    Current occupation is .   Primary job tasks include:  Computer work, prolonged sitting and repetitive tasks.                  Therapist Generated HPI Evaluation  Problem details: Pt presents to PT with a chief complaint of R lateral shoulder and elbow pain with reported worsening ~3 weeks ago with potential correlation to biking. Primary pain location identified at R upper arm and secondarily into R elbow and pecs. Pain has improved since onset with NSAIDs and rest from biking. Pain remains with lifting/reaching overhead .         Type of problem:  Right shoulder.    This is a new condition.  Occurance: Biking   Where condition occurred: during recreation/sport.  Patient reports pain:  Lateral.  Pain is described as aching and is intermittent.  Pain radiates to:  Upper arm and elbow. Pain is worse in the P.M..  Since onset symptoms are gradually improving.  Associated symptoms:  Loss of motion/stiffness. Symptoms are exacerbated by using arm at shoulder level, lifting and using arm overhead  and relieved by NSAID's.    Previous treatment includes physical therapy. There was moderate improvement following previous treatment.  Restrictions due to condition include:  Working in normal job without restrictions.  Barriers include:  None as reported by patient.                        Objective:  Standing Alignment:      Shoulder/UE:  Protracted scapula R and rounded shoulders                                       Shoulder Evaluation:  ROM:  AROM:    Flexion:  Left:  130     Right:  140    Abduction:  Left: 130   Right:  130    Internal Rotation:  Left:  WNL    Right:  WNL  External Rotation:  Left:  70    Right:  70                  Pain: Pain with R shoulder active abduction     Strength:    Flexion: Left:5/5   Pain:    Right: 5/5     Pain:     Abduction:  Left: 5-/5  Pain:    Right: 4+/5      Pain:++    Internal Rotation:  Left:5/5     Pain:    Right: 5/5     Pain:  External Rotation:   Left:5-/5     Pain:   Right:5-/5     Pain:                Palpation:      Right shoulder tenderness present at: Infraspinatus                                     General     ROS    Assessment/Plan:    Patient is a 65 year old male with right side shoulder complaints.    Patient has the following significant findings with corresponding treatment plan.                Diagnosis 1:  R shoulder pain  Pain -  manual therapy, splint/taping/bracing/orthotics, self management and education  Decreased ROM/flexibility - manual therapy and therapeutic exercise  Decreased strength - therapeutic exercise and therapeutic activities  Impaired muscle performance - neuro re-education  Impaired posture - neuro re-education    Therapy Evaluation Codes:   Cumulative Therapy Evaluation is: Low complexity.    Previous and current functional limitations:  (See Goal Flow Sheet for this information)    Short term and Long term goals: (See Goal Flow Sheet for this information)     Communication ability:  Patient appears to be able to clearly communicate and understand verbal and written communication and follow directions correctly.  Treatment Explanation - The following has been discussed with the patient:   RX ordered/plan of care  Anticipated outcomes  Possible risks and side effects  This patient would benefit from PT intervention to resume normal activities.   Rehab potential is good.    Frequency:  1 X week, once daily  Duration:  for 4 weeks  Discharge Plan:  Achieve all LTG.  Independent in home treatment  program.  Reach maximal therapeutic benefit.    Please refer to the daily flowsheet for treatment today, total treatment time and time spent performing 1:1 timed codes.

## 2021-02-18 ENCOUNTER — TELEPHONE (OUTPATIENT)
Dept: DERMATOLOGY | Facility: CLINIC | Age: 66
End: 2021-02-18

## 2021-02-18 NOTE — TELEPHONE ENCOUNTER
Prior Authorization Retail Medication Request    Medication/Dose: fluocinonide (LIDEX) 0.05 % external ointment  ICD code (if different than what is on RX):  [L28.1]   Previously Tried and Failed:  See Chart  Rationale:      Insurance Name:  [facility]  Insurance ID: [id number]      Pharmacy Information (if different than what is on RX)  Name:  [facility]  Phone:  [phone]

## 2021-02-22 NOTE — TELEPHONE ENCOUNTER
PA Initiation    Medication: fluocinonide (LIDEX) 0.05 % external ointment   Insurance Company: [facility] - Phone [phone] Fax [phone]  Pharmacy Filling the Rx: [facility] [place] - [address] AT [facility]  Filling Pharmacy Phone: [phone]  Filling Pharmacy Fax:    Start Date: 2/22/2021

## 2021-02-23 NOTE — TELEPHONE ENCOUNTER
Prior Authorization Not Needed per Insurance    Medication: fluocinonide (LIDEX) 0.05 % external ointment--NO PA NEEDED  Insurance Company: [employer] [place] - Phone [phone] Fax [phone]  Expected CoPay:      Pharmacy Filling the Rx: [facility] [place] - [address] AT [facility]  Pharmacy Notified: Yes  Patient Notified: Yes      [facility] currently doesn't have an active script for the medication.  Please send script to pharmacy.

## 2021-03-03 DIAGNOSIS — L28.1 PRURIGO NODULARIS: ICD-10-CM

## 2021-03-03 RX ORDER — FLUOCINONIDE 0.5 MG/G
OINTMENT TOPICAL
Qty: 30 G | Refills: 11 | Status: SHIPPED | OUTPATIENT
Start: 2021-03-03 | End: 2022-04-14

## 2021-03-05 ENCOUNTER — IMMUNIZATION (OUTPATIENT)
Dept: NURSING | Facility: CLINIC | Age: 66
End: 2021-03-05
Payer: COMMERCIAL

## 2021-03-05 PROCEDURE — 91303 PR COVID VAC JANSSEN AD26 0.5ML: CPT

## 2021-03-05 PROCEDURE — 0031A PR COVID VAC JANSSEN AD26 0.5ML: CPT

## 2021-03-18 ENCOUNTER — OFFICE VISIT (OUTPATIENT)
Dept: DERMATOLOGY | Facility: CLINIC | Age: 66
End: 2021-03-18
Payer: COMMERCIAL

## 2021-03-18 DIAGNOSIS — L57.0 ACTINIC KERATOSIS: ICD-10-CM

## 2021-03-18 DIAGNOSIS — L21.9 DERMATITIS, SEBORRHEIC: Primary | ICD-10-CM

## 2021-03-18 DIAGNOSIS — D22.9 MULTIPLE BENIGN NEVI: ICD-10-CM

## 2021-03-18 DIAGNOSIS — L81.4 SOLAR LENTIGO: ICD-10-CM

## 2021-03-18 DIAGNOSIS — L82.1 SEBORRHEIC KERATOSIS: ICD-10-CM

## 2021-03-18 PROCEDURE — 17003 DESTRUCT PREMALG LES 2-14: CPT | Performed by: DERMATOLOGY

## 2021-03-18 PROCEDURE — 17000 DESTRUCT PREMALG LESION: CPT | Performed by: DERMATOLOGY

## 2021-03-18 PROCEDURE — 99213 OFFICE O/P EST LOW 20 MIN: CPT | Mod: 25 | Performed by: DERMATOLOGY

## 2021-03-18 RX ORDER — KETOCONAZOLE 20 MG/ML
SHAMPOO TOPICAL
Qty: 120 ML | Refills: 11 | Status: SHIPPED | OUTPATIENT
Start: 2021-03-18 | End: 2022-04-14

## 2021-03-18 ASSESSMENT — PAIN SCALES - GENERAL: PAINLEVEL: NO PAIN (0)

## 2021-03-18 NOTE — NURSING NOTE
Chief Complaint   Patient presents with     Derm Problem     Nick is here for a skin check. He would like to talk about dermatitis. Areas of concern are the scalp, hairline and lower lip.      Ilsa Melendez LPN

## 2021-03-18 NOTE — LETTER
3/18/2021       RE: Marvin Elaine  [address]  [facility] [address]     Dear Colleague,    Thank you for referring your patient, Marvin Elaine, to the [facility] [place] at [facility]. Please see a copy of my visit note below.    [facility] Dermatology Note  Encounter Date: Mar 18, 2021  Office Visit     Dermatology Problem List:  1. Inflamed SK  -s/p cryotherapy   2. Xerosis cutis  -current t/x: cream based moisturizer  3. AKs. LiqN2.   4. Acne / folliculitis with likely secondary acne excorie.   - current tx: BP 4-5% wash, clindamycin 1% lotion, clindamycin 1% solution  - previous tx:doxycycline 100 mg PO BID/qdaily x 6 months (improvement),  bleach baths, hydrocortisone 0.2% cream  5. Seborrheic dermatitis.   - ketoconazole 2% shampoo TIW    ____________________________________________    Assessment & Plan:     # Actinic keratoses. L nasal sidewall x 1, R dorsal hand x 3.    - Cryotherapy performed today (see procedure note(s) below).     # Seborrheic dermatitis, forehead and scalp.   - Start ketoconazole 2% shampoo TIW.     #Benign lesions: Multiple benign nevi, solar lentigos, seborrheic keratoses, cherry angiomas. Explained to patient benign nature of lesion. No treatment is necessary at this time unless the lesion changes or becomes symptomatic.     - ABCDs of melanoma were discussed and self skin checks were advised.  - Sun precaution was advised including the use of sun screens of SPF 30 or higher, sun protective clothing, and avoidance of tanning beds.    #Acne/folliculitis with acne excorie.   - Can continue BP wash and clindamycin 1% lotion.     Procedures Performed:   - Cryotherapy procedure note, location(s): left nasal sidewall x 1, right dorsa hand x 3. After verbal consent and discussion of risks and benefits including, but not limited to, dyspigmentation/scar, blister, and pain, 4  lesion(s) was(were) treated with 1-2 mm freeze border for 1-2 cycles with liquid nitrogen. Post cryotherapy instructions were provided.    Follow-up: 1 year(s) in-person, or earlier for new or changing lesions    Staff:     Harpal Dalal MD  Pronouns: he/him/his    Department of Dermatology  [facility]: Phone: [phone], Fax:[phone]  [facility]: Phone: [phone] Fax: [phone]    ____________________________________________    CC: Derm Problem (Nick is here for a skin check. He would like to talk about dermatitis. Areas of concern are the scalp, hairline and lower lip. )    HPI:  Mr. Marvin Elaine is a(n) 65 year old male who presents today as a return patient for acne vulgaris/folliculitis +/- acne excorie/prurigo nodularis and for FBSE. Last seen on 10/12/2020 in virtual visit when continued on topical therapies and started on lidex 0.05% ointment for history of acne/acne excoriee. Recommended in-person visit for FBSE given prior history of AKs.     Today, patient reports a few areas of concern including.     (1) Brown, rough spots on his legs and neck. No pain, tenderness or bleeding.     (2) Dry, flaky, minimally itchy skin on his forehead and scalp. He has tried using T-gel shampoo but states he did not like the smell and discontinued use. He is interested in alternatives.      The patient otherwise denies any new or concerning lesions. No bleeding, painful, pruritic, or changing lesions. Health otherwise stable. No other skin concerns.     Labs Reviewed:  N/A    Physical Exam:  Vitals: There were no vitals taken for this visit.  SKIN: Full skin, which includes the head/face, both arms, chest, back, abdomen,both legs, genitalia and/or groin buttocks, digits and/or nails, was examined.  - - Brown macules on sun exposed areas  - Brown waxy, stuck-on appearing papules on  face, trunk, and extremities.   - Brown macules and papules on trunk and extremities without concerning dermoscopy features  - Red vascular papules on face, trunk and extremities.   - Pink gritty papules on the L nasal sidewall x 1, right dorsal hand x 3.   - Mild pink erythema and white flaky scale on bilateral scalp and forehead  - No other lesions of concern on areas examined.     Medications:  Current Outpatient Medications   Medication     clindamycin (CLEOCIN T) 1 % external lotion     clindamycin 1 % EX external lotion     clindamycin 1 % EX external solution     fluocinonide (LIDEX) 0.05 % external ointment     ibuprofen (ADVIL/MOTRIN) 200 MG tablet     ketoconazole (NIZORAL) 2 % external shampoo     latanoprost (XALATAN) 0.005 % ophthalmic solution     multivitamin, therapeutic with minerals (MULTI-VITAMIN) TABS     PARoxetine (PAXIL) 30 MG tablet     ALPRAZOLAM PO     ASPIRIN PO     buPROPion (WELLBUTRIN) 100 MG tablet     doxycycline monohydrate (MONODOX) 100 MG capsule     fluticasone (FLONASE) 50 MCG/ACT nasal spray     hydrocortisone (WESTCORT) 0.2 % external cream     loratadine (CLARITIN) 10 MG tablet     ondansetron (ZOFRAN ODT) 4 MG ODT tab     propranolol (INDERAL LA) 60 MG 24 hr capsule     REXULTI 0.5 MG tablet     vilazodone (VIIBRYD) 40 MG TABS tablet     VITAMIN D, CHOLECALCIFEROL, PO     No current facility-administered medications for this visit.       Past Medical History:   Patient Active Problem List   Diagnosis     Anxiety     Persistent depressive disorder     CARDIOVASCULAR SCREENING; LDL GOAL LESS THAN 160     Chronic pelvic pain in male     Osteochondritis     Chronic midline low back pain without sciatica     Encounter for general adult medical examination with abnormal findings     Shoulder pain, right     Past Medical History:   Diagnosis Date     Anxiety      Depressive disorder      Prostate infection

## 2021-03-18 NOTE — PROGRESS NOTES
[facility] Dermatology Note  Encounter Date: Mar 18, 2021  Office Visit     Dermatology Problem List:  1. Inflamed SK  -s/p cryotherapy   2. Xerosis cutis  -current t/x: cream based moisturizer  3. AKs. LiqN2.   4. Acne / folliculitis with likely secondary acne excorie.   - current tx: BP 4-5% wash, clindamycin 1% lotion, clindamycin 1% solution  - previous tx:doxycycline 100 mg PO BID/qdaily x 6 months (improvement),  bleach baths, hydrocortisone 0.2% cream  5. Seborrheic dermatitis.   - ketoconazole 2% shampoo TIW    ____________________________________________    Assessment & Plan:     # Actinic keratoses. L nasal sidewall x 1, R dorsal hand x 3.    - Cryotherapy performed today (see procedure note(s) below).     # Seborrheic dermatitis, forehead and scalp.   - Start ketoconazole 2% shampoo TIW.     #Benign lesions: Multiple benign nevi, solar lentigos, seborrheic keratoses, cherry angiomas. Explained to patient benign nature of lesion. No treatment is necessary at this time unless the lesion changes or becomes symptomatic.     - ABCDs of melanoma were discussed and self skin checks were advised.  - Sun precaution was advised including the use of sun screens of SPF 30 or higher, sun protective clothing, and avoidance of tanning beds.    #Acne/folliculitis with acne excorie.   - Can continue BP wash and clindamycin 1% lotion.     Procedures Performed:   - Cryotherapy procedure note, location(s): left nasal sidewall x 1, right dorsa hand x 3. After verbal consent and discussion of risks and benefits including, but not limited to, dyspigmentation/scar, blister, and pain, 4 lesion(s) was(were) treated with 1-2 mm freeze border for 1-2 cycles with liquid nitrogen. Post cryotherapy instructions were provided.    Follow-up: 1 year(s) in-person, or earlier for new or changing lesions    Staff:     Harpal Dalal MD  Pronouns: he/him/his    Department of Dermatology  [facility]  [facility]: Phone: [phone], Fax:[phone]  [facility]: Phone: [phone] Fax: [phone]    ____________________________________________    CC: Derm Problem (Nick is here for a skin check. He would like to talk about dermatitis. Areas of concern are the scalp, hairline and lower lip. )    HPI:  Mr. Marvin Elaine is a(n) 65 year old male who presents today as a return patient for acne vulgaris/folliculitis +/- acne excorie/prurigo nodularis and for FBSE. Last seen on 10/12/2020 in virtual visit when continued on topical therapies and started on lidex 0.05% ointment for history of acne/acne excoriee. Recommended in-person visit for FBSE given prior history of AKs.     Today, patient reports a few areas of concern including.     (1) Brown, rough spots on his legs and neck. No pain, tenderness or bleeding.     (2) Dry, flaky, minimally itchy skin on his forehead and scalp. He has tried using T-gel shampoo but states he did not like the smell and discontinued use. He is interested in alternatives.      The patient otherwise denies any new or concerning lesions. No bleeding, painful, pruritic, or changing lesions. Health otherwise stable. No other skin concerns.     Labs Reviewed:  N/A    Physical Exam:  Vitals: There were no vitals taken for this visit.  SKIN: Full skin, which includes the head/face, both arms, chest, back, abdomen,both legs, genitalia and/or groin buttocks, digits and/or nails, was examined.  - - Brown macules on sun exposed areas  - Brown waxy, stuck-on appearing papules on face, trunk, and extremities.   - Brown macules and papules on trunk and extremities without concerning dermoscopy features  - Red vascular papules on face, trunk and extremities.   - Pink gritty papules on the L nasal sidewall x 1, right dorsal hand x 3.   - Mild pink erythema and white flaky scale on bilateral scalp and  forehead  - No other lesions of concern on areas examined.     Medications:  Current Outpatient Medications   Medication     clindamycin (CLEOCIN T) 1 % external lotion     clindamycin 1 % EX external lotion     clindamycin 1 % EX external solution     fluocinonide (LIDEX) 0.05 % external ointment     ibuprofen (ADVIL/MOTRIN) 200 MG tablet     ketoconazole (NIZORAL) 2 % external shampoo     latanoprost (XALATAN) 0.005 % ophthalmic solution     multivitamin, therapeutic with minerals (MULTI-VITAMIN) TABS     PARoxetine (PAXIL) 30 MG tablet     ALPRAZOLAM PO     ASPIRIN PO     buPROPion (WELLBUTRIN) 100 MG tablet     doxycycline monohydrate (MONODOX) 100 MG capsule     fluticasone (FLONASE) 50 MCG/ACT nasal spray     hydrocortisone (WESTCORT) 0.2 % external cream     loratadine (CLARITIN) 10 MG tablet     ondansetron (ZOFRAN ODT) 4 MG ODT tab     propranolol (INDERAL LA) 60 MG 24 hr capsule     REXULTI 0.5 MG tablet     vilazodone (VIIBRYD) 40 MG TABS tablet     VITAMIN D, CHOLECALCIFEROL, PO     No current facility-administered medications for this visit.       Past Medical History:   Patient Active Problem List   Diagnosis     Anxiety     Persistent depressive disorder     CARDIOVASCULAR SCREENING; LDL GOAL LESS THAN 160     Chronic pelvic pain in male     Osteochondritis     Chronic midline low back pain without sciatica     Encounter for general adult medical examination with abnormal findings     Shoulder pain, right     Past Medical History:   Diagnosis Date     Anxiety      Depressive disorder      Prostate infection

## 2021-03-28 ENCOUNTER — HEALTH MAINTENANCE LETTER (OUTPATIENT)
Age: 66
End: 2021-03-28

## 2021-07-26 ENCOUNTER — THERAPY VISIT (OUTPATIENT)
Dept: PHYSICAL THERAPY | Facility: CLINIC | Age: 66
End: 2021-07-26
Payer: COMMERCIAL

## 2021-07-26 DIAGNOSIS — M25.569 KNEE PAIN: Primary | ICD-10-CM

## 2021-07-26 PROCEDURE — 97161 PT EVAL LOW COMPLEX 20 MIN: CPT | Mod: GP | Performed by: PHYSICAL THERAPIST

## 2021-07-26 PROCEDURE — 97110 THERAPEUTIC EXERCISES: CPT | Mod: GP | Performed by: PHYSICAL THERAPIST

## 2021-07-26 NOTE — PROGRESS NOTES
Physical Therapy Initial Evaluation  Subjective:  The history is provided by the patient. No  was used.   Therapist Generated HPI Evaluation  Problem details: Pt presents to PT l with juan knee aching and pain. He notes pain with prolonged sitting, aching after biking, and pain with stairs in the AM.  Pain patellar tendon area and he notes intermittant quad mm spasms.     Pt is able to bike 4-5 miles, he would like to bike more, but feels limited by pain and aching.  Pt also notes increased pain in the hand/wrists with biking.  He has worn gloves with gel with no relief of the pain/numbness.   .         Type of problem:  Bilateral knees.    This is a chronic condition.  Condition occurred with:  Insidious onset and repetition/overuse.  Where condition occurred: for unknown reasons.  Patient reports pain:  Anterior and sub patellar.  Pain is described as aching and sharp and is intermittent.  Pain is worse during the day.  Since onset symptoms are unchanged.  Associated symptoms:  Loss of motion/stiffness. Symptoms are exacerbated by ascending stairs, descending stairs, kneeling, bending/squatting and walking  and relieved by analgesics.    Previous treatment includes physical therapy.   Work activity restrictions: retired.  Barriers include:  None as reported by patient.                        Objective:  System                                                Knee Evaluation:  ROM:  AROM: normal  PROM: normal            Strength:     Extension:  Left: 4+/5   Pain:      Right: 4+/5   Pain:  Flexion:  Left: 4-/5   Pain:      Right: 4-/5   Pain:    Quad Set Left: Good    Pain:   Quad Set Right: Good    Pain:  Ligament Testing:  Normal                Special Tests:       Right knee negative for the following special tests:  Patellar Compression  Palpation:      Right knee tenderness present at:  Patellar Tendon  Edema:  Normal      Functional Testing:          Quad:    Single Leg Squat:  Left:        Right:        Bilateral Leg Squat:  Pain with squatting  Mild loss of control              General     ROS    Assessment/Plan:    Patient is a 65 year old male with both sides knee complaints.    Patient has the following significant findings with corresponding treatment plan.                Diagnosis 1:  Chronic knee pain  Pain -  hot/cold therapy, manual therapy, self management and education  Decreased strength - therapeutic exercise and therapeutic activities    Therapy Evaluation Codes:   1) History comprised of:   Personal factors that impact the plan of care:      None.    Comorbidity factors that impact the plan of care are:      None, Chemical Dependency, High blood pressure and Numbness/tingling.     Medications impacting care: Anti-depressant.  2) Examination of Body Systems comprised of:   Body structures and functions that impact the plan of care:      Knee.   Activity limitations that impact the plan of care are:      Bending, Sitting, Squatting/kneeling and Stairs.  3) Clinical presentation characteristics are:   Stable/Uncomplicated.  4) Decision-Making    Low complexity using standardized patient assessment instrument and/or measureable assessment of functional outcome.  Cumulative Therapy Evaluation is: Low complexity.    Previous and current functional limitations:  (See Goal Flow Sheet for this information)    Short term and Long term goals: (See Goal Flow Sheet for this information)     Communication ability:  Patient appears to be able to clearly communicate and understand verbal and written communication and follow directions correctly.  Treatment Explanation - The following has been discussed with the patient:   RX ordered/plan of care  Anticipated outcomes  Possible risks and side effects  This patient would benefit from PT intervention to resume normal activities.   Rehab potential is good.    Frequency:  1 X week, once daily  Duration:  for 6 weeks  Discharge Plan:  Achieve all  LTG.  Independent in home treatment program.  Reach maximal therapeutic benefit.    Please refer to the daily flowsheet for treatment today, total treatment time and time spent performing 1:1 timed codes.

## 2021-07-27 ASSESSMENT — ACTIVITIES OF DAILY LIVING (ADL)
PAIN: THE SYMPTOM AFFECTS MY ACTIVITY SLIGHTLY
KNEE_ACTIVITY_OF_DAILY_LIVING_SUM: 61
AS_A_RESULT_OF_YOUR_KNEE_INJURY,_HOW_WOULD_YOU_RATE_YOUR_CURRENT_LEVEL_OF_DAILY_ACTIVITY?: NEARLY NORMAL
GO UP STAIRS: ACTIVITY IS MINIMALLY DIFFICULT
HOW_WOULD_YOU_RATE_THE_CURRENT_FUNCTION_OF_YOUR_KNEE_DURING_YOUR_USUAL_DAILY_ACTIVITIES_ON_A_SCALE_FROM_0_TO_100_WITH_100_BEING_YOUR_LEVEL_OF_KNEE_FUNCTION_PRIOR_TO_YOUR_INJURY_AND_0_BEING_THE_INABILITY_TO_PERFORM_ANY_OF_YOUR_USUAL_DAILY_ACTIVITIES?: 70
GO DOWN STAIRS: ACTIVITY IS SOMEWHAT DIFFICULT
STAND: ACTIVITY IS NOT DIFFICULT
KNEE_ACTIVITY_OF_DAILY_LIVING_SCORE: 87.14
KNEEL ON THE FRONT OF YOUR KNEE: ACTIVITY IS NOT DIFFICULT
LIMPING: I HAVE THE SYMPTOM BUT IT DOES NOT AFFECT MY ACTIVITY
SWELLING: I DO NOT HAVE THE SYMPTOM
SQUAT: ACTIVITY IS NOT DIFFICULT
SIT WITH YOUR KNEE BENT: ACTIVITY IS MINIMALLY DIFFICULT
STIFFNESS: I DO NOT HAVE THE SYMPTOM
RISE FROM A CHAIR: ACTIVITY IS MINIMALLY DIFFICULT
GIVING WAY, BUCKLING OR SHIFTING OF KNEE: I DO NOT HAVE THE SYMPTOM
WALK: ACTIVITY IS NOT DIFFICULT
HOW_WOULD_YOU_RATE_THE_OVERALL_FUNCTION_OF_YOUR_KNEE_DURING_YOUR_USUAL_DAILY_ACTIVITIES?: NEARLY NORMAL
RAW_SCORE: 61
WEAKNESS: I HAVE THE SYMPTOM BUT IT DOES NOT AFFECT MY ACTIVITY

## 2021-07-27 NOTE — PROGRESS NOTES
Physical Therapy Initial Evaluation  Subjective:    Patient Health History             Pertinent medical history includes: chemical dependency, depression, mental illness and numbness/tingling.   Red flags:  None as reported by patient.  Medical allergies: none.   Surgeries include:  Other (appendix).    Current medications:  Anti-depressants.    Current occupation is .                          Knee Activity of Daily Living Score: 87.14            Objective:  System    Physical Exam    General     ROS    Assessment/Plan:

## 2021-09-02 ENCOUNTER — THERAPY VISIT (OUTPATIENT)
Dept: PHYSICAL THERAPY | Facility: CLINIC | Age: 66
End: 2021-09-02
Payer: COMMERCIAL

## 2021-09-02 DIAGNOSIS — M25.531 PAIN IN BOTH WRISTS: ICD-10-CM

## 2021-09-02 DIAGNOSIS — M25.532 PAIN IN BOTH WRISTS: ICD-10-CM

## 2021-09-02 DIAGNOSIS — M25.569 KNEE PAIN: Primary | ICD-10-CM

## 2021-09-02 PROCEDURE — 97112 NEUROMUSCULAR REEDUCATION: CPT | Mod: GP | Performed by: PHYSICAL THERAPIST

## 2021-09-02 PROCEDURE — 97110 THERAPEUTIC EXERCISES: CPT | Mod: GP | Performed by: PHYSICAL THERAPIST

## 2021-09-12 ENCOUNTER — HEALTH MAINTENANCE LETTER (OUTPATIENT)
Age: 66
End: 2021-09-12

## 2021-09-13 ENCOUNTER — THERAPY VISIT (OUTPATIENT)
Dept: PHYSICAL THERAPY | Facility: CLINIC | Age: 66
End: 2021-09-13
Payer: COMMERCIAL

## 2021-09-13 DIAGNOSIS — M25.531 PAIN IN BOTH WRISTS: ICD-10-CM

## 2021-09-13 DIAGNOSIS — M25.532 PAIN IN BOTH WRISTS: ICD-10-CM

## 2021-09-13 DIAGNOSIS — M25.569 KNEE PAIN: Primary | ICD-10-CM

## 2021-09-13 PROCEDURE — 97530 THERAPEUTIC ACTIVITIES: CPT | Mod: GP | Performed by: PHYSICAL THERAPIST

## 2021-09-13 PROCEDURE — 97112 NEUROMUSCULAR REEDUCATION: CPT | Mod: GP | Performed by: PHYSICAL THERAPIST

## 2021-09-27 ENCOUNTER — THERAPY VISIT (OUTPATIENT)
Dept: PHYSICAL THERAPY | Facility: CLINIC | Age: 66
End: 2021-09-27
Payer: COMMERCIAL

## 2021-09-27 DIAGNOSIS — M25.569 KNEE PAIN: Primary | ICD-10-CM

## 2021-09-27 DIAGNOSIS — M25.531 PAIN IN BOTH WRISTS: ICD-10-CM

## 2021-09-27 DIAGNOSIS — M25.532 PAIN IN BOTH WRISTS: ICD-10-CM

## 2021-09-27 DIAGNOSIS — M25.511 SHOULDER PAIN, RIGHT: ICD-10-CM

## 2021-09-27 PROCEDURE — 97112 NEUROMUSCULAR REEDUCATION: CPT | Mod: GP | Performed by: PHYSICAL THERAPIST

## 2021-09-27 PROCEDURE — 97110 THERAPEUTIC EXERCISES: CPT | Mod: GP | Performed by: PHYSICAL THERAPIST

## 2021-10-11 ENCOUNTER — TELEPHONE (OUTPATIENT)
Dept: FAMILY MEDICINE | Facility: CLINIC | Age: 66
End: 2021-10-11

## 2021-10-11 ENCOUNTER — THERAPY VISIT (OUTPATIENT)
Dept: PHYSICAL THERAPY | Facility: CLINIC | Age: 66
End: 2021-10-11
Payer: COMMERCIAL

## 2021-10-11 DIAGNOSIS — M25.569 KNEE PAIN: Primary | ICD-10-CM

## 2021-10-11 DIAGNOSIS — M25.532 PAIN IN BOTH WRISTS: ICD-10-CM

## 2021-10-11 DIAGNOSIS — M25.539 PAIN IN WRIST, UNSPECIFIED LATERALITY: Primary | ICD-10-CM

## 2021-10-11 DIAGNOSIS — M25.569 KNEE PAIN, UNSPECIFIED CHRONICITY, UNSPECIFIED LATERALITY: ICD-10-CM

## 2021-10-11 DIAGNOSIS — M25.531 PAIN IN BOTH WRISTS: ICD-10-CM

## 2021-10-11 PROCEDURE — 97110 THERAPEUTIC EXERCISES: CPT | Mod: GP | Performed by: PHYSICAL THERAPIST

## 2021-10-11 NOTE — TELEPHONE ENCOUNTER
Reason for Call: Other referral    Detailed comments: Patient is needing a new PT referral placed and faxed to [phone]. Please assist. Thanks!    Phone Number Patient can be reached at: [phone]    Best Time: Any    Can we leave a detailed message on this number? YES    Call taken on 10/11/2021 at 4:55 PM by Delfina Russell

## 2021-10-12 NOTE — TELEPHONE ENCOUNTER
Detailed VM left informing patient referral  faxed and also asked him to schedule physical.    VV 4/29/2020.  Last physical 1/16/2019.    Edwige Del Rosario RN

## 2021-10-19 NOTE — PATIENT INSTRUCTIONS
Cleared for procedure, paxil working fantastically!  Flu shot, had covid, check home blood pressue, add annual wellness too  Preparing for Your Surgery  Getting started  A nurse will call you to review your health history and instructions. They will give you an arrival time based on your scheduled surgery time.  Please be ready to share the following:    Your doctor's clinic name and phone number    Your medical, surgical and anesthesia history    A list of allergies and sensitivities    A list of medicines, including herbal treatments and over-the-counter drugs    Whether the patient has a legal guardian (ask how to send us the papers in advance)  If you have a child who's having surgery, please ask for a copy of Preparing for Your Child's Surgery.    Preparing for surgery    Within 30 days of surgery: Have a pre-op exam (sometimes called an H&P, or History and Physical). This can be done at a clinic or pre-operative center.  ? If you're having a , you may not need this exam. Talk to your care team    At your pre-op exam, talk to your care team about all medicines you take. If you need to stop any medicines before surgery, ask when to start taking them again.  ? We do this for your safety. Many medicines can make you bleed too much during surgery. Some change how well surgery (anesthesia) drugs work.    Call your insurance company to let them know you're having surgery. (If you don't have insurance, call [phone].)    Call your clinic if there's any change in your health. This includes signs of a cold or flu (sore throat, runny nose, cough, rash, fever). It also includes a scrape or scratch near the surgery site.    If you have questions on the day of surgery, call your hospital or surgery center.  Eating and drinking guidelines  For your safety: Unless your surgeon tells you otherwise, follow the guidelines below.    Eat and drink as usual until 8 hours before surgery. After that, no food or  milk.    Drink clear liquids until 2 hours before surgery. These are liquids you can see through, like water, Gatorade and Propel Water. You may also have black coffee and tea (no cream or milk).    Nothing by mouth within 2 hours of surgery. This includes gum, candy and breath mints.    If you drink, stop drinking alcohol the night before surgery.    If your care team tells you to take medicine on the morning of surgery, it's okay to take it with a sip of water.  Preventing infection    Shower or bathe the night before and morning of your surgery. Follow the instructions your clinic gave you. (If no instructions, use regular soap.)    Don't shave or clip hair near your surgery site. We'll remove the hair if needed.    Don't smoke or vape the morning of surgery. You may chew nicotine gum up to 2 hours before surgery. A nicotine patch is okay.  ? Note: Some surgeries require you to completely quit smoking and nicotine. Check with your surgeon.    Your care team will make every effort to keep you safe from infection. We will:  ? Clean our hands often with soap and water (or an alcohol-based hand rub).  ? Clean the skin at your surgery site with a special soap that kills germs.  ? Give you a special gown to keep you warm. (Cold raises the risk of infection.)  ? Wear special hair covers, masks, gowns and gloves during surgery.  ? Give antibiotic medicine, if prescribed. Not all surgeries need antibiotics.  What to bring on the day of surgery    Photo ID and insurance card    Copy of your health care directive, if you have one    Glasses and hearing aides (bring cases)  ? You can't wear contacts during surgery    Inhaler and eye drops, if you use them (tell us about these when you arrive)    CPAP machine or breathing device, if you use them    A few personal items, if spending the night    If you have . . .  ? A pacemaker or ICD (cardiac defibrillator): Bring the ID card.  ? An implanted stimulator: Bring the remote  control.  ? A legal guardian: Bring a copy of the certified (court-stamped) guardianship papers.  Please remove any jewelry, including body piercings. Leave jewelry and other valuables at home.  If you're going home the day of surgery  Important: If you don't follow the rules below, we must cancel your surgery.     Arrange for someone to drive you home after surgery. You may not drive, take a taxi or take public transportation by yourself (unless you'll have local anesthesia only).    Arrange for a responsible adult to stay with you overnight. If you don't, we may keep you in the hospital overnight, and you may need to pay the costs yourself.  Questions?   If you have any questions for your care team, list them here: _________________________________________________________________________________________________________________________________________________________________________________________________________________________________________________________________________________________________________________________  For informational purposes only. Not to replace the advice of your health care provider. Copyright   2003, 2019 [facility]. All rights reserved. Clinically reviewed by Bianca Dos Santos MD. [employer] [id number] - REV 4/20.    Preventive Health Recommendations:     See your health care provider every year to    Review health changes.     Discuss preventive care.      Review your medicines if your doctor has prescribed any.      Talk with your health care provider about whether you should have a test to screen for prostate cancer (PSA).    Every 3 years, have a diabetes test (fasting glucose). If you are at risk for diabetes, you should have this test more often.    Every 5 years, have a cholesterol test. Have this test more often if you are at risk for high cholesterol or heart disease.     Every 10 years, have a colonoscopy. Or, have a yearly FIT test (stool test). These exams will check  for colon cancer.    Talk to with your health care provider about screening for Abdominal Aortic Aneurysm if you have a family history of AAA or have a history of smoking.    Shots:     Get a flu shot each year.     Get a tetanus shot every 10 years.     Talk to your doctor about your pneumonia vaccines. There are now two you should receive - Pneumovax (PPSV 23) and Prevnar (PCV 13).     Talk to your pharmacist about a shingles vaccine.     Talk to your doctor about the hepatitis B vaccine.  Nutrition:     Eat at least 5 servings of fruits and vegetables each day.     Eat whole-grain bread, whole-wheat pasta and brown rice instead of white grains and rice.     Get adequate Calcium and Vitamin D.   Lifestyle    Exercise for at least 150 minutes a week (30 minutes a day, 5 days a week). This will help you control your weight and prevent disease.     Limit alcohol to one drink per day.     No smoking.     Wear sunscreen to prevent skin cancer.    See your dentist every six months for an exam and cleaning.    See your eye doctor every 1 to 2 years to screen for conditions such as glaucoma, macular degeneration, cataracts, etc.    Personalized Prevention Plan  You are due for the preventive services outlined below.  Your care team is available to assist you in scheduling these services.  If you have already completed any of these items, please share that information with your care team to update in your medical record.  Health Maintenance Due   Topic Date Due     URINE DRUG SCREEN  Never done     ANNUAL REVIEW OF HM ORDERS  Never done     Discuss Advance Care Planning  Never done     Annual Wellness Visit  12/21/2019

## 2021-10-19 NOTE — PROGRESS NOTES
[facility]  [address]  [facility] [address]  Phone: [phone]  Primary Provider: Wegener, Joel Daniel Irwin  Pre-op Performing Provider: WEGENER, JOEL DANIEL IRWIN      PREOPERATIVE EVALUATION:  Today's date: 10/21/2021    Marvin Elaine is a 66 year old male who presents for a preoperative evaluation.    Surgical Information:  Surgery/Procedure: cataract   Surgery Location: [facility]   Surgeon: Bekah Alberts   Surgery Date: 10/29/2021  Time of Surgery: 11:00am   Where patient plans to recover: At home alone  Fax number for surgical facility:  [phone]    Type of Anesthesia Anticipated: Local    Assessment & Plan     The proposed surgical procedure is considered LOW risk.    Preop general physical exam  Cleared for procedure,     Skip medications on the morning of the procedure.     paxil working fantastically!  Flu shot, had covid, check home blood pressue, add annual wellness too    Routine general medical examination at a health care facility  Reviewed preventive health maintenance items including colon/prostate screening.     Elected to combine this with with annual wellness/physical so does not have to come back next week , is healthy and addressed preventive items today.     Cataract, unspecified cataract type, unspecified laterality  Reason for procedure.     Anxiety  Excellent control now with paxil.  First time in life he believes  Working with a psychiatrist and therapist as well.     Need for viral immunization  Today.     Had covid vaccine already.   - INFLUENZA, QUAD, HD, PF, 65+ (FLUZONE HD)    CARDIOVASCULAR SCREENING; LDL GOAL LESS THAN 160    - Lipid panel reflex to direct LDL Fasting  - Lipid panel reflex to direct LDL Fasting    Screening for prostate cancer  Reviewed risks/benefits   - Prostate Specific Antigen Screen  - Prostate Specific Antigen Screen                   RECOMMENDATION:  APPROVAL GIVEN to proceed with proposed procedure,  without further diagnostic evaluation.                      Subjective     HPI related to upcoming procedure: planning cataract surgery for worsening vision.     Feeling mentally and physically well.  Actually despite covid/riots, etc feeling the best mentally that he has in his whole life and attributes this mainly to changing to paxil.  His brother had been on paxil and wored very well for him!  Very happy about this.  Continues to work with a psychiatrist and psychologist.       Preop Questions 10/21/2021   1. Have you ever had a heart attack or stroke? No   2. Have you ever had surgery on your heart or blood vessels, such as a stent placement, a coronary artery bypass, or surgery on an artery in your head, neck, heart, or legs? No   3. Do you have chest pain with activity? No   4. Do you have a history of  heart failure? No   5. Do you currently have a cold, bronchitis or symptoms of other infection? No   6. Do you have a cough, shortness of breath, or wheezing? No   7. Do you or anyone in your family have previous history of blood clots? No   8. Do you or does anyone in your family have a serious bleeding problem such as prolonged bleeding following surgeries or cuts? No   9. Have you ever had problems with anemia or been told to take iron pills? No   10. Have you had any abnormal blood loss such as black, tarry or bloody stools? No   11. Have you ever had a blood transfusion? No   12. Are you willing to have a blood transfusion if it is medically needed before, during, or after your surgery? Yes   13. Have you or any of your relatives ever had problems with anesthesia? No   14. Do you have sleep apnea, excessive snoring or daytime drowsiness? No   15. Do you have any artifical heart valves or other implanted medical devices like a pacemaker, defibrillator, or continuous glucose monitor? No   16. Do you have artificial joints? No   17. Are you allergic to latex? No     Health Care Directive:  Patient does not  have a Health Care Directive or Living Will: Discussed advance care planning with patient; information given to patient to review.    Preoperative Review of :   reviewed - no record of controlled substances prescribed.          Review of Systems  CONSTITUTIONAL: NEGATIVE for fever, chills, change in weight  INTEGUMENTARY/SKIN: NEGATIVE for worrisome rashes, moles or lesions  EYES: NEGATIVE for vision changes or irritation  ENT/MOUTH: NEGATIVE for ear, mouth and throat problems  RESP: NEGATIVE for significant cough or SOB  CV: NEGATIVE for chest pain, palpitations or peripheral edema  GI: NEGATIVE for nausea, abdominal pain, heartburn, or change in bowel habits  : NEGATIVE for frequency, dysuria, or hematuria  MUSCULOSKELETAL: NEGATIVE for significant arthralgias or myalgia  NEURO: NEGATIVE for weakness, dizziness or paresthesias  ENDOCRINE: NEGATIVE for temperature intolerance, skin/hair changes  HEME: NEGATIVE for bleeding problems  PSYCHIATRIC: NEGATIVE for changes in mood or affect    Patient Active Problem List    Diagnosis Date Noted     Shoulder pain, right 11/18/2020     Priority: Medium     Encounter for general adult medical examination with abnormal findings 12/20/2018     Priority: Medium     Chronic midline low back pain without sciatica 01/18/2017     Priority: Medium     Osteochondritis 11/22/2016     Priority: Medium     Chronic pelvic pain in male 06/27/2014     Priority: Medium     CARDIOVASCULAR SCREENING; LDL GOAL LESS THAN 160 03/12/2014     Priority: Medium     Anxiety 02/28/2014     Priority: Medium     Persistent depressive disorder 02/28/2014     Priority: Medium      Past Medical History:   Diagnosis Date     Anxiety      Depressive disorder      Prostate infection      Past Surgical History:   Procedure Laterality Date     LAPAROSCOPIC APPENDECTOMY  1/21/2012    Procedure:LAPAROSCOPIC APPENDECTOMY; Open Appendectomy; Surgeon:GERDA GASTON; Location:[facility]     Current  Outpatient Medications   Medication Sig Dispense Refill     ALPRAZOLAM PO Take 0.25 mg by mouth 2 times daily        ASPIRIN PO Take 81 mg by mouth daily       buPROPion (WELLBUTRIN) 100 MG tablet Take 150 mg by mouth 3 times daily  90 tablet      clindamycin (CLEOCIN T) 1 % external lotion Apply once daily after showering. 60 mL 11     clindamycin 1 % EX external lotion Apply once daily to arms, chest, and back. 120 mL 11     clindamycin 1 % EX external solution Apply 10-15 drops once daily to the scalp. 60 mL 1     doxycycline monohydrate (MONODOX) 100 MG capsule Take 1 capsule (100 mg) by mouth daily (Patient not taking: Reported on 10/21/2020) 30 capsule 2     fluocinonide (LIDEX) 0.05 % external ointment Apply twice daily as needed for itchy spots. 30 g 11     fluticasone (FLONASE) 50 MCG/ACT nasal spray Spray 1 spray into both nostrils daily       hydrocortisone (WESTCORT) 0.2 % external cream Apply twice daily as needed for itchy spots on back (Patient not taking: Reported on 3/18/2021) 60 g 3     ibuprofen (ADVIL/MOTRIN) 200 MG tablet Take 2 tablets (400 mg) by mouth every 8 hours as needed for pain 60 tablet 0     ketoconazole (NIZORAL) 2 % external shampoo Lather onto forehead and scalp at least 2-3 times weekly in the shower. Let sit for 1-2 minutes before rinsing. 120 mL 11     latanoprost (XALATAN) 0.005 % ophthalmic solution Place 1 drop into both eyes daily   0     loratadine (CLARITIN) 10 MG tablet Take 10 mg by mouth daily       multivitamin, therapeutic with minerals (MULTI-VITAMIN) TABS Take 1 tablet by mouth daily       ondansetron (ZOFRAN ODT) 4 MG ODT tab Take 1-2 tablets (4-8 mg) by mouth every 8 hours as needed for nausea (Patient not taking: Reported on 12/13/2019) 10 tablet 0     PARoxetine (PAXIL) 30 MG tablet Take 40 mg by mouth every morning        propranolol (INDERAL LA) 60 MG 24 hr capsule Take 1 capsule (60 mg) by mouth daily (Patient not taking: Reported on 1/9/2020) 90 capsule 3      REXULTI 0.5 MG tablet TK 1 T PO QD  5     vilazodone (VIIBRYD) 40 MG TABS tablet 60 mg        VITAMIN D, CHOLECALCIFEROL, PO Take 1,000 Units by mouth 2 times daily Vitamin D          No Known Allergies     Social History     Tobacco Use     Smoking status: Never Smoker     Smokeless tobacco: Never Used   Substance Use Topics     Alcohol use: No     Family History   Problem Relation Age of Onset     Respiratory Mother         copd     Psychotic Disorder Mother      C.A.D. Father      Psychotic Disorder Father         depression, anxiety, alcohol abuse     Prostate Cancer No family hx of      Cancer - colorectal No family hx of      Diabetes No family hx of      Skin Cancer No family hx of      History   Drug Use No         Objective     There were no vitals taken for this visit.    Physical Exam  GENERAL APPEARANCE: healthy, alert and no distress  HENT: ear canals and TM's normal and nose and mouth without ulcers or lesions  RESP: lungs clear to auscultation - no rales, rhonchi or wheezes  CV: regular rate and rhythm, normal S1 S2, no S3 or S4 and no murmur, click or rub   ABDOMEN: soft, nontender, no HSM or masses and bowel sounds normal  NEURO: Normal strength and tone, sensory exam grossly normal, mentation intact and speech normal    No results for input(s): HGB, PLT, INR, NA, POTASSIUM, CR, A1C in the last 11972 hours.     Diagnostics:  Labs pending at this time.  Results will be reviewed when available.   No EKG required for low risk surgery (cataract, skin procedure, breast biopsy, etc).    Revised Cardiac Risk Index (RCRI):  The patient has the following serious cardiovascular risks for perioperative complications:   - No serious cardiac risks = 0 points     RCRI Interpretation: 0 points: Class I (very low risk - 0.4% complication rate)           Signed Electronically by: Joel Daniel Wegener, MD  Copy of this evaluation report is provided to requesting physician.      Answers for HPI/ROS submitted by the  patient on 10/21/2021  If you checked off any problems, how difficult have these problems made it for you to do your work, take care of things at home, or get along with other people?: Not difficult at all  PHQ9 TOTAL SCORE: 0

## 2021-10-21 ENCOUNTER — TELEPHONE (OUTPATIENT)
Dept: FAMILY MEDICINE | Facility: CLINIC | Age: 66
End: 2021-10-21

## 2021-10-21 ENCOUNTER — OFFICE VISIT (OUTPATIENT)
Dept: FAMILY MEDICINE | Facility: CLINIC | Age: 66
End: 2021-10-21
Payer: COMMERCIAL

## 2021-10-21 VITALS
SYSTOLIC BLOOD PRESSURE: 144 MMHG | HEART RATE: 63 BPM | WEIGHT: 123.3 LBS | OXYGEN SATURATION: 100 % | TEMPERATURE: 97.7 F | BODY MASS INDEX: 18.75 KG/M2 | DIASTOLIC BLOOD PRESSURE: 76 MMHG

## 2021-10-21 DIAGNOSIS — Z13.6 CARDIOVASCULAR SCREENING; LDL GOAL LESS THAN 160: ICD-10-CM

## 2021-10-21 DIAGNOSIS — Z12.5 SCREENING FOR PROSTATE CANCER: ICD-10-CM

## 2021-10-21 DIAGNOSIS — Z01.818 PREOP GENERAL PHYSICAL EXAM: Primary | ICD-10-CM

## 2021-10-21 DIAGNOSIS — Z00.00 ROUTINE GENERAL MEDICAL EXAMINATION AT A HEALTH CARE FACILITY: ICD-10-CM

## 2021-10-21 DIAGNOSIS — F41.9 ANXIETY: ICD-10-CM

## 2021-10-21 DIAGNOSIS — H26.9 CATARACT, UNSPECIFIED CATARACT TYPE, UNSPECIFIED LATERALITY: ICD-10-CM

## 2021-10-21 DIAGNOSIS — Z23 NEED FOR VIRAL IMMUNIZATION: ICD-10-CM

## 2021-10-21 PROCEDURE — G0008 ADMIN INFLUENZA VIRUS VAC: HCPCS | Performed by: FAMILY MEDICINE

## 2021-10-21 PROCEDURE — 80061 LIPID PANEL: CPT | Performed by: FAMILY MEDICINE

## 2021-10-21 PROCEDURE — 99213 OFFICE O/P EST LOW 20 MIN: CPT | Mod: 25 | Performed by: FAMILY MEDICINE

## 2021-10-21 PROCEDURE — 96127 BRIEF EMOTIONAL/BEHAV ASSMT: CPT | Performed by: FAMILY MEDICINE

## 2021-10-21 PROCEDURE — 99397 PER PM REEVAL EST PAT 65+ YR: CPT | Mod: 25 | Performed by: FAMILY MEDICINE

## 2021-10-21 PROCEDURE — G0103 PSA SCREENING: HCPCS | Performed by: FAMILY MEDICINE

## 2021-10-21 PROCEDURE — 36415 COLL VENOUS BLD VENIPUNCTURE: CPT | Performed by: FAMILY MEDICINE

## 2021-10-21 PROCEDURE — 90662 IIV NO PRSV INCREASED AG IM: CPT | Performed by: FAMILY MEDICINE

## 2021-10-21 ASSESSMENT — PATIENT HEALTH QUESTIONNAIRE - PHQ9
10. IF YOU CHECKED OFF ANY PROBLEMS, HOW DIFFICULT HAVE THESE PROBLEMS MADE IT FOR YOU TO DO YOUR WORK, TAKE CARE OF THINGS AT HOME, OR GET ALONG WITH OTHER PEOPLE: NOT DIFFICULT AT ALL
SUM OF ALL RESPONSES TO PHQ QUESTIONS 1-9: 0
SUM OF ALL RESPONSES TO PHQ QUESTIONS 1-9: 0

## 2021-10-21 NOTE — TELEPHONE ENCOUNTER
Call received from patient asking if he can have COVID-19 booster vaccine at today's pre-op visit.    Informed patient currently [facility] is not offering COVID-19 booster vaccination to those who received the [employer] COVID-19 vaccine.      It is writer's understanding the CDC will make a decision soon regarding this particular booster vaccination and guidelines will likely change.    Patient verbalized understanding and in agreement with plan.    AUGUSTUS OconnellN, RN  [facility]

## 2021-10-22 LAB
CHOLEST SERPL-MCNC: 197 MG/DL
FASTING STATUS PATIENT QL REPORTED: NO
HDLC SERPL-MCNC: 76 MG/DL
LDLC SERPL CALC-MCNC: 110 MG/DL
NONHDLC SERPL-MCNC: 121 MG/DL
PSA SERPL-MCNC: 2.89 UG/L (ref 0–4)
TRIGL SERPL-MCNC: 54 MG/DL

## 2021-10-22 ASSESSMENT — PATIENT HEALTH QUESTIONNAIRE - PHQ9: SUM OF ALL RESPONSES TO PHQ QUESTIONS 1-9: 0

## 2021-10-23 NOTE — PROGRESS NOTES
SUBJECTIVE:   Marvin Elaine is a 66 year old male who presents for Preventive Visit.      Patient has been advised of split billing requirements and indicates understanding: Yes   Are you in the first 12 months of your Medicare coverage?  No    HPI  Do you feel safe in your environment? Yes    Have you ever done Advance Care Planning? (For example, a Health Directive, POLST, or a discussion with a medical provider or your loved ones about your wishes): No, advance care planning information given to patient to review.  Patient plans to discuss their wishes with loved ones or provider.         Fall risk       Cognitive Screening   1) Repeat 3 items (Leader, Season, Table)    2) Clock draw: NORMAL  3) 3 item recall: Recalls 3 objects  Results: NORMAL clock, 1-2 items recalled: COGNITIVE IMPAIRMENT LESS LIKELY    Mini-CogTM Copyright S Roxanna. Licensed by the author for use in [facility]; reprinted with permission (faisal@[facility]). All rights reserved.      Do you have sleep apnea, excessive snoring or daytime drowsiness?: no    Reviewed and updated as needed this visit by clinical staff                 Reviewed and updated as needed this visit by Provider                Social History     Tobacco Use     Smoking status: Never Smoker     Smokeless tobacco: Never Used   Substance Use Topics     Alcohol use: No     If you drink alcohol do you typically have >3 drinks per day or >7 drinks per week? No    Alcohol Use 12/21/2018   Prescreen: >3 drinks/day or >7 drinks/week? Not Applicable   Prescreen: >3 drinks/day or >7 drinks/week? -               Current providers sharing in care for this patient include:   Patient Care Team:  Wegener, Joel Daniel Irwin, MD as PCP - General (Family Practice)  ABHAY Soto MD as MD (Dermatology)  Wegener, Joel Daniel Irwin, MD as Assigned PCP  Harpal Dalal MD as Assigned Surgical Provider    The following health maintenance items are reviewed in Epic and  "correct as of today:  Health Maintenance Due   Topic Date Due     URINE DRUG SCREEN  Never done     ANNUAL REVIEW OF HM ORDERS  Never done     ADVANCE CARE PLANNING  Never done     MEDICARE ANNUAL WELLNESS VISIT  12/21/2019               Review of Systems      OBJECTIVE:   BP (!) 144/76   Pulse 63   Temp 97.7  F (36.5  C) (Temporal)   Wt 55.9 kg (123 lb 4.8 oz)   SpO2 100%   BMI 18.75 kg/m   Estimated body mass index is 18.75 kg/m  as calculated from the following:    Height as of 1/16/19: 1.727 m (5' 8\").    Weight as of this encounter: 55.9 kg (123 lb 4.8 oz).  Physical Exam  See pre-op note        ASSESSMENT / PLAN:   See pre-op note    Patient has been advised of split billing requirements and indicates understanding: Yes  COUNSELING:  Reviewed preventive health counseling, as reflected in patient instructions       Regular exercise       Healthy diet/nutrition       Colon cancer screening       Prostate cancer screening    Estimated body mass index is 18.75 kg/m  as calculated from the following:    Height as of 1/16/19: 1.727 m (5' 8\").    Weight as of this encounter: 55.9 kg (123 lb 4.8 oz).        He reports that he has never smoked. He has never used smokeless tobacco.      Appropriate preventive services were discussed with this patient, including applicable screening as appropriate for cardiovascular disease, diabetes, osteopenia/osteoporosis, and glaucoma.  As appropriate for age/gender, discussed screening for colorectal cancer, prostate cancer, breast cancer, and cervical cancer. Checklist reviewing preventive services available has been given to the patient.    Reviewed patients plan of care and provided an AVS. The Basic Care Plan (routine screening as documented in Health Maintenance) for Marvin meets the Care Plan requirement. This Care Plan has been established and reviewed with the Patient.    Counseling Resources:  ATP IV Guidelines  Pooled Cohorts Equation Calculator  Breast Cancer Risk " Calculator  Breast Cancer: Medication to Reduce Risk  FRAX Risk Assessment  ICSI Preventive Guidelines  Dietary Guidelines for Americans, 2010  [employer] MyPlate  ASA Prophylaxis  Lung CA Screening    Joel Daniel Wegener, MD  [facility]    Identified Health Risks:  Answers for HPI/ROS submitted by the patient on 10/21/2021  If you checked off any problems, how difficult have these problems made it for you to do your work, take care of things at home, or get along with other people?: Not difficult at all  PHQ9 TOTAL SCORE: 0

## 2021-12-09 PROBLEM — M25.511 SHOULDER PAIN, RIGHT: Status: RESOLVED | Noted: 2020-11-18 | Resolved: 2021-12-09

## 2021-12-09 NOTE — PROGRESS NOTES
Discharge Note    Progress reporting period is from initial evaluation date (please see noted date below) to Oct 11, 2021.       Marvin failed to follow up and current status is unknown.  Please see information below for last relevant information on current status.  Patient seen for   visits.    SUBJECTIVE  Subjective changes noted by patient:  Nick reports he had a flare up of knee pain after a very brisk walk the other day.   Wrists feel 75-80% back to normal. Knee feels   .  Current pain level is  .     Previous pain level was  3/10.   Changes in function:  Yes (See Goal flowsheet attached for changes in current functional level)  Adverse reaction to treatment or activity: None    OBJECTIVE  Changes noted in objective findings: ROM WNL at knee and wrists.      ASSESSMENT/PLAN      Updated problem list and treatment plan:   Pain - HEP  Decreased function - HEP  STG/LTGs have been met or progress has been made towards goals:  Yes, please see goal flowsheet for most current information  Assessment of Progress: current status is unknown.    Last current status:     Self Management Plans:  HEP  I have re-evaluated this patient and find that the nature, scope, duration and intensity of the therapy is appropriate for the medical condition of the patient.  Marvin continues to require the following intervention to meet STG and LTG's:  HEP.    Recommendations:  Discharge with current home program.  Patient to follow up with MD as needed.    Please refer to the daily flowsheet for treatment today, total treatment time and time spent performing 1:1 timed codes.

## 2021-12-23 ENCOUNTER — MYC MEDICAL ADVICE (OUTPATIENT)
Dept: FAMILY MEDICINE | Facility: CLINIC | Age: 66
End: 2021-12-23
Payer: COMMERCIAL

## 2021-12-23 DIAGNOSIS — E78.5 HYPERLIPIDEMIA LDL GOAL <130: Primary | ICD-10-CM

## 2021-12-27 PROBLEM — E78.5 HYPERLIPIDEMIA LDL GOAL <130: Status: ACTIVE | Noted: 2021-12-27

## 2021-12-27 RX ORDER — ATORVASTATIN CALCIUM 10 MG/1
10 TABLET, FILM COATED ORAL DAILY
Qty: 90 TABLET | Refills: 1 | Status: SHIPPED | OUTPATIENT
Start: 2021-12-27 | End: 2022-06-01

## 2022-04-13 NOTE — PROGRESS NOTES
[facility] Dermatology Note  Encounter Date: Apr 14, 2022  Office Visit     Dermatology Problem List:  1. Inflamed SK  -s/p cryotherapy   2. Xerosis cutis  -current t/x: cream based moisturizer  3. AKs. LiqN2.   4. Acne / folliculitis with likely secondary acne excorie.   - current tx: BP 4-5% wash, clindamycin 1% lotion, clindamycin 1% solution  - previous tx:doxycycline 100 mg PO BID/qdaily x 6 months (improvement),  bleach baths, hydrocortisone 0.2% cream  5. Seborrheic dermatitis.   - ketoconazole 2% shampoo three times per week  - Synalar solution at bedtime     ____________________________________________    Assessment & Plan:    # Actinic keratoses. R nasal sidewall x 1, R dorsal hand x 2, L dorsal hand x1.    - Cryotherapy performed today (see procedure note(s) below).      # Seborrheic dermatitis, forehead and scalp. Chronic, flare.   - Continue ketoconazole 2% shampoo TIW. Okay to apply to face.  - Start Synalar at night for scalp itching. Discussed using 10-15 drops at night and massage into scalp.      # Benign lesions: Multiple benign nevi, solar lentigos, seborrheic keratoses, cherry angiomas. Explained to patient benign nature of lesion. No treatment is necessary at this time unless the lesion changes or becomes symptomatic.      - ABCDs of melanoma were discussed and self skin checks were advised.  - Sun precaution was advised including the use of sun screens of SPF 30 or higher, sun protective clothing, and avoidance of tanning beds.     # Acne/folliculitis with acne excorie/prurigo nodularis.   - Lidex 0.05% ointment BID PRN flares  - Can continue BP wash and clindamycin 1% lotion.     Procedures Performed:   - Cryotherapy procedure note, location(s): Dorsum of left hand (1 spots), Dorsum of right hand (2 spots), right nasal sidewall. After verbal consent and discussion of risks and benefits including, but not limited to, dyspigmentation/scar, blister, and pain, 4 AK lesion(s)  was(were) treated with 1-2 mm freeze border for 1-2 cycles with liquid nitrogen. Post cryotherapy instructions were provided.    Follow-up: 1 year for FBSE    Staff and Resident: Z[place]/Mulugeta Tai MD PGY3  [facility] Medicine Residency Program    Staff Physician Comments:   I saw and evaluated the patient with the resident and I agree with the assessment and plan.  I was present for the entire minor procedure and examination.    Harpal Dalal MD  Pronouns: he/him/his    Department of Dermatology  [facility]: Phone: [phone], Fax:[phone]  [facility]: Phone: [phone] Fax: [phone]  ___________________________________________    CC: Skin Check (Nick is here today for FBSE, wants to discuss folliculitis)    HPI:  Mr. Marvin Elaine is a(n) 66 year old male who presents today as a return patient for skin check and follow up of seborrheic dermatitis. Last seen by me on 3/18/2021, at which time patient underwent cryo for treatment of actinic keratoses. Additionally, patient was started on ketoconazole 2% shampoo three times per week for treatment of seborrheic dermatitis.    Today, patient reports two spots, one on each anterior thigh both in the same place. Left spot has been there for four days. It is uniform, but itchy. Right spot he doesn't know how long he has been there. Described as painful itch. Thinks it may be just his folliculitis. He does think the lidex is very helpful.    He is very irritated by his scalp dermatitis. He showers a couple times a week and uses it. He has been using it as prescribed. He would like stronger strength if available.     He doesn't wear sunscreen regularly.    Patient is otherwise feeling well, without additional skin concerns.    Labs Reviewed:  N/A    Physical Exam:  Vitals: There were no vitals taken for this  visit.  SKIN: Full skin, which includes the head/face, both arms, chest, back, abdomen,both legs, genitalia and/or groin buttocks, digits and/or nails, was examined.  - Few spots on both dorsal hands that are coarse, flaky  - No other lesions of concern on areas examined.     Medications:  Current Outpatient Medications   Medication     atorvastatin (LIPITOR) 10 MG tablet     clindamycin (CLEOCIN T) 1 % external lotion     clindamycin 1 % EX external lotion     clindamycin 1 % EX external solution     fluocinonide (LIDEX) 0.05 % external ointment     ibuprofen (ADVIL/MOTRIN) 200 MG tablet     ketoconazole (NIZORAL) 2 % external shampoo     multivitamin w/minerals (THERA-VIT-M) tablet     PARoxetine (PAXIL) 30 MG tablet     latanoprost (XALATAN) 0.005 % ophthalmic solution     VITAMIN D, CHOLECALCIFEROL, PO     No current facility-administered medications for this visit.      Past Medical History:   Patient Active Problem List   Diagnosis     Anxiety     Persistent depressive disorder     CARDIOVASCULAR SCREENING; LDL GOAL LESS THAN 160     Chronic pelvic pain in male     Osteochondritis     Chronic midline low back pain without sciatica     Encounter for general adult medical examination with abnormal findings     Hyperlipidemia LDL goal <130     Past Medical History:   Diagnosis Date     Anxiety      Depressive disorder      Prostate infection

## 2022-04-14 ENCOUNTER — OFFICE VISIT (OUTPATIENT)
Dept: DERMATOLOGY | Facility: CLINIC | Age: 67
End: 2022-04-14
Payer: COMMERCIAL

## 2022-04-14 DIAGNOSIS — L21.9 DERMATITIS, SEBORRHEIC: ICD-10-CM

## 2022-04-14 DIAGNOSIS — D18.01 CHERRY ANGIOMA: ICD-10-CM

## 2022-04-14 DIAGNOSIS — L28.1 PRURIGO NODULARIS: ICD-10-CM

## 2022-04-14 DIAGNOSIS — L57.0 ACTINIC KERATOSIS: Primary | ICD-10-CM

## 2022-04-14 DIAGNOSIS — L82.1 SEBORRHEIC KERATOSIS: ICD-10-CM

## 2022-04-14 DIAGNOSIS — D22.9 MULTIPLE BENIGN NEVI: ICD-10-CM

## 2022-04-14 DIAGNOSIS — L81.4 SOLAR LENTIGO: ICD-10-CM

## 2022-04-14 PROCEDURE — 17003 DESTRUCT PREMALG LES 2-14: CPT | Mod: GC | Performed by: DERMATOLOGY

## 2022-04-14 PROCEDURE — 99214 OFFICE O/P EST MOD 30 MIN: CPT | Mod: 25 | Performed by: DERMATOLOGY

## 2022-04-14 PROCEDURE — 17000 DESTRUCT PREMALG LESION: CPT | Mod: GC | Performed by: DERMATOLOGY

## 2022-04-14 RX ORDER — FLUOCINONIDE 0.5 MG/G
OINTMENT TOPICAL
Qty: 30 G | Refills: 11 | Status: SHIPPED | OUTPATIENT
Start: 2022-04-14 | End: 2023-09-19

## 2022-04-14 RX ORDER — KETOCONAZOLE 20 MG/ML
SHAMPOO TOPICAL
Qty: 120 ML | Refills: 11 | Status: SHIPPED | OUTPATIENT
Start: 2022-04-14 | End: 2023-06-13

## 2022-04-14 RX ORDER — FLUOCINOLONE ACETONIDE 0.1 MG/ML
SOLUTION TOPICAL DAILY
Qty: 90 ML | Refills: 11 | Status: SHIPPED | OUTPATIENT
Start: 2022-04-14 | End: 2023-05-12

## 2022-04-14 ASSESSMENT — PAIN SCALES - GENERAL: PAINLEVEL: NO PAIN (0)

## 2022-04-14 NOTE — NURSING NOTE
Dermatology Rooming Note    Marvin Elaine's goals for this visit include:   Chief Complaint   Patient presents with     Skin Check     Nick is here today for FBSE, wants to discuss folliculitis     Misty Aggarwal, EMT

## 2022-04-14 NOTE — LETTER
4/14/2022       RE: Marvin Elaine  [address]  [facility] [address]     Dear Colleague,    Thank you for referring your patient, Marvin Elaine, to the [facility] [place] [facility]. Please see a copy of my visit note below.    [facility] Dermatology Note  Encounter Date: Apr 14, 2022  Office Visit     Dermatology Problem List:  1. Inflamed SK  -s/p cryotherapy   2. Xerosis cutis  -current t/x: cream based moisturizer  3. AKs. LiqN2.   4. Acne / folliculitis with likely secondary acne excorie.   - current tx: BP 4-5% wash, clindamycin 1% lotion, clindamycin 1% solution  - previous tx:doxycycline 100 mg PO BID/qdaily x 6 months (improvement),  bleach baths, hydrocortisone 0.2% cream  5. Seborrheic dermatitis.   - ketoconazole 2% shampoo three times per week  - Synalar solution at bedtime     ____________________________________________    Assessment & Plan:    # Actinic keratoses. R nasal sidewall x 1, R dorsal hand x 2, L dorsal hand x1.    - Cryotherapy performed today (see procedure note(s) below).      # Seborrheic dermatitis, forehead and scalp. Chronic, flare.   - Continue ketoconazole 2% shampoo TIW. Okay to apply to face.  - Start Synalar at night for scalp itching. Discussed using 10-15 drops at night and massage into scalp.      # Benign lesions: Multiple benign nevi, solar lentigos, seborrheic keratoses, cherry angiomas. Explained to patient benign nature of lesion. No treatment is necessary at this time unless the lesion changes or becomes symptomatic.      - ABCDs of melanoma were discussed and self skin checks were advised.  - Sun precaution was advised including the use of sun screens of SPF 30 or higher, sun protective clothing, and avoidance of tanning beds.     # Acne/folliculitis with acne excorie/prurigo nodularis.   - Lidex 0.05% ointment BID PRN flares  - Can continue BP wash and  clindamycin 1% lotion.     Procedures Performed:   - Cryotherapy procedure note, location(s): Dorsum of left hand (1 spots), Dorsum of right hand (2 spots), right nasal sidewall. After verbal consent and discussion of risks and benefits including, but not limited to, dyspigmentation/scar, blister, and pain, 4 AK lesion(s) was(were) treated with 1-2 mm freeze border for 1-2 cycles with liquid nitrogen. Post cryotherapy instructions were provided.    Follow-up: 1 year for FBSE    Staff and Resident: Zaire/Mulugeta Tai MD PGY3  [facility] Residency Program    Staff Physician Comments:   I saw and evaluated the patient with the resident and I agree with the assessment and plan.  I was present for the entire minor procedure and examination.    Harpal Dalal MD  Pronouns: he/him/his    Department of Dermatology  [facility]: Phone: [phone], Fax:[phone]  [facility]: Phone: [phone] Fax: [phone]  ___________________________________________    CC: Skin Check (Nick is here today for FBSE, wants to discuss folliculitis)    HPI:  Mr. Marvin Elaine is a(n) 66 year old male who presents today as a return patient for skin check and follow up of seborrheic dermatitis. Last seen by me on 3/18/2021, at which time patient underwent cryo for treatment of actinic keratoses. Additionally, patient was started on ketoconazole 2% shampoo three times per week for treatment of seborrheic dermatitis.    Today, patient reports two spots, one on each anterior thigh both in the same place. Left spot has been there for four days. It is uniform, but itchy. Right spot he doesn't know how long he has been there. Described as painful itch. Thinks it may be just his folliculitis. He does think the lidex is very helpful.    He is very irritated by his scalp dermatitis. He  showers a couple times a week and uses it. He has been using it as prescribed. He would like stronger strength if available.     He doesn't wear sunscreen regularly.    Patient is otherwise feeling well, without additional skin concerns.    Labs Reviewed:  N/A    Physical Exam:  Vitals: There were no vitals taken for this visit.  SKIN: Full skin, which includes the head/face, both arms, chest, back, abdomen,both legs, genitalia and/or groin buttocks, digits and/or nails, was examined.  - Few spots on both dorsal hands that are coarse, flaky  - No other lesions of concern on areas examined.     Medications:  Current Outpatient Medications   Medication     atorvastatin (LIPITOR) 10 MG tablet     clindamycin (CLEOCIN T) 1 % external lotion     clindamycin 1 % EX external lotion     clindamycin 1 % EX external solution     fluocinonide (LIDEX) 0.05 % external ointment     ibuprofen (ADVIL/MOTRIN) 200 MG tablet     ketoconazole (NIZORAL) 2 % external shampoo     multivitamin w/minerals (THERA-VIT-M) tablet     PARoxetine (PAXIL) 30 MG tablet     latanoprost (XALATAN) 0.005 % ophthalmic solution     VITAMIN D, CHOLECALCIFEROL, PO     No current facility-administered medications for this visit.      Past Medical History:   Patient Active Problem List   Diagnosis     Anxiety     Persistent depressive disorder     CARDIOVASCULAR SCREENING; LDL GOAL LESS THAN 160     Chronic pelvic pain in male     Osteochondritis     Chronic midline low back pain without sciatica     Encounter for general adult medical examination with abnormal findings     Hyperlipidemia LDL goal <130     Past Medical History:   Diagnosis Date     Anxiety      Depressive disorder      Prostate infection

## 2022-04-28 DIAGNOSIS — L21.9 DERMATITIS, SEBORRHEIC: Primary | ICD-10-CM

## 2022-04-28 RX ORDER — FLUOCINONIDE TOPICAL SOLUTION USP, 0.05% 0.5 MG/ML
SOLUTION TOPICAL
Qty: 60 ML | Refills: 11 | Status: SHIPPED | OUTPATIENT
Start: 2022-04-28

## 2022-06-03 NOTE — TELEPHONE ENCOUNTER
DIAGNOSIS: knee pain/ self ref/ no images   APPOINTMENT DATE: 6.7.22   NOTES STATUS DETAILS   MEDICATION LIST Internal

## 2022-06-06 DIAGNOSIS — M25.562 BILATERAL KNEE PAIN: Primary | ICD-10-CM

## 2022-06-06 DIAGNOSIS — M25.561 BILATERAL KNEE PAIN: Primary | ICD-10-CM

## 2022-06-07 ENCOUNTER — OFFICE VISIT (OUTPATIENT)
Dept: ORTHOPEDICS | Facility: CLINIC | Age: 67
End: 2022-06-07
Payer: COMMERCIAL

## 2022-06-07 ENCOUNTER — ANCILLARY PROCEDURE (OUTPATIENT)
Dept: GENERAL RADIOLOGY | Facility: CLINIC | Age: 67
End: 2022-06-07
Attending: FAMILY MEDICINE
Payer: COMMERCIAL

## 2022-06-07 ENCOUNTER — PRE VISIT (OUTPATIENT)
Dept: ORTHOPEDICS | Facility: CLINIC | Age: 67
End: 2022-06-07
Payer: COMMERCIAL

## 2022-06-07 DIAGNOSIS — G89.29 CHRONIC PAIN OF BOTH KNEES: Primary | ICD-10-CM

## 2022-06-07 DIAGNOSIS — M25.562 CHRONIC PAIN OF BOTH KNEES: Primary | ICD-10-CM

## 2022-06-07 DIAGNOSIS — M25.561 CHRONIC PAIN OF BOTH KNEES: Primary | ICD-10-CM

## 2022-06-07 PROCEDURE — 99203 OFFICE O/P NEW LOW 30 MIN: CPT | Performed by: FAMILY MEDICINE

## 2022-06-07 PROCEDURE — 73562 X-RAY EXAM OF KNEE 3: CPT | Mod: RT | Performed by: RADIOLOGY

## 2022-06-07 NOTE — PROGRESS NOTES
[facility] CLINICS AND SURGERY CENTER  SPORTS & ORTHOPEDIC CLINIC VISIT     Jun 7, 2022        ASSESSMENT & PLAN     66-year-old with bilateral knee pain that is likely multifactorial.  Some of his generalized achiness and stiffness is due to the osteoarthritis seen in the medial compartment.  However, the pain examined in the anterior superior knee with biking is likely chronic quadriceps tendinopathy.    Reviewed imaging and assessment with patient in detail  Recommended use of topical diclofenac as needed for pain  May consider use of patellar cutout knee sleeve for comfort  Provided with home exercises.  He will contact us if he would like a formal referral to physical therapy.  Follow-up.    Santiago Nelson MD  [facility] SPORTS MEDICINE CLINIC [place]    -----  Chief Complaint   Patient presents with     Left Knee - Pain     Right Knee - Pain       SUBJECTIVE  Marvin Elaine is a/an 66 year old male who is seen as a self referral for evaluation of  Bilateral knee pain.     The patient is seen by themselves.  The patient is Right handed    Onset: 25 years(s) ago. Reports insidious onset without acute precipitating event.  Location of Pain: bilateral knee, mostly anterior and superior to the patella.  Worsened by: Walking, biking, sitting,   Better with: Biofreeze, aspirin   Treatments tried: Biofreeze, aspirin, Physical therapy previously for his patellar tendinitis  Associated symptoms: no distal numbness or tingling; denies swelling or warmth    Orthopedic/Surgical history: NO  Social History/Occupation: Retired       REVIEW OF SYSTEMS:    Do you have fever, chills, weight loss? No    Do you have any vision problems? No    Do you have any chest pain or edema? No    Do you have any shortness of breath or wheezing?  No    Do you have stomach problems? No    Do you have any numbness or focal weakness? No    Do you have diabetes? No    Do you have problems with bleeding or clotting? No    Do you have  an rashes or other skin lesions? No    OBJECTIVE:  There were no vitals taken for this visit.     Patient is alert, No acute distress, pleasant and conversational.    Gait: nonantalgic. Normal heel toe gait.    Patient is able to perform two legged squat without difficulty.    bilateral knee:   Skin intact. No erythema or ecchymosis.  No effusion or soft tissue swelling.    AROM: Zero to approximately 135  without restriction or reported pain.    Palpation: No medial or lateral facet joint tenderness.  No posterior medial or posterior lateral joint line tenderness     Special Tests:  Negative bounce test, negative forced flexion and negative Shashi's.  No ligamentous laxity or pain with valgus or varus stress.  Negative Lachman's, Anterior Drawer and Posterior Drawer     Full Isometric quad strength, extensor mechanism in place     Neurovascularly intact in the lower extremity    Hip and Ankle with full AROM and nontender      RADIOLOGY:    Three-view x-rays of the bilateral knees are performed and reviewed independently demonstrating mild medial compartment joint space loss bilaterally.  Small enthesophyte on superior pole the patella.  Otherwise no significant DJD.  No acute findings.  See EMR for formal radiology report.

## 2022-06-07 NOTE — PATIENT INSTRUCTIONS
Try using topical diclofenac on the knee up to three times daily  Consider using knee brace with patella cut out for comfort  Contact us if you would like a referral to physical therapy    HOW CAN I TAKE CARE OF MYSELF?    To help the swelling and pain:    Put an ice pack, gel pack, or package of frozen vegetables wrapped in a cloth, on the area every 3 to 4 hours for up to 20 minutes at a time.  Keep your knee up on a pillow when you sit or lie down.  Take pain medicine, such as acetaminophen, ibuprofen, or other medicine as directed by your provider. Nonsteroidal anti-inflammatory medicines (NSAIDs), such as ibuprofen, may cause stomach bleeding and other problems. These risks increase with age. Read the label and take as directed. Unless recommended by your healthcare provider, do not take for more than 10 days.  Follow your healthcare provider's instructions, including any exercises recommended by your provider. Ask your provider:    How and when you will hear your test results  How long it will take to recover  What activities you should avoid, including how much you can lift, and when you can return to your normal activities  How to take care of yourself at home  What symptoms or problems you should watch for and what to do if you have them  Make sure you know when you should come back for a checkup.      Patellar Tendonitis Exercises    You can do the first 4 exercises right away. When you have less pain in your knee, you can do the remaining exercises.    Standing hamstring stretch: Put the heel of the leg on your injured side on a stool about 15 inches high. Keep your leg straight. Lean forward, bending at the hips, until you feel a mild stretch in the back of your thigh. Make sure you don't roll your shoulders or bend at the waist when doing this or you will stretch your lower back instead of your leg. Hold the stretch for 15 to 30 seconds. Repeat 3 times.    Quadriceps stretch: Stand at an arm's length  away from the wall with your injured side farthest from the wall. Facing straight ahead, brace yourself by keeping one hand against the wall. With your other hand, grasp the ankle on your injured side and pull your heel toward your buttocks. Don't arch or twist your back. Keep your knees together. Hold this stretch for 15 to 30 seconds.    Side-lying leg lift: Lie on your uninjured side. Tighten the front thigh muscles on your injured leg and lift that leg 8 to 10 inches (20 to 25 centimeters) away from the other leg. Keep the leg straight and lower it slowly. Do 2 sets of 15.  Rectus femoris stretch: Kneel on your injured knee on a padded surface. Place your other leg in front of you with your foot flat on the floor. Keep your head and chest facing forward and upright and grab the ankle behind you. Gently bring your ankle back toward your buttocks until you feel a stretch in the front of your thigh. Hold 15 to 30 seconds. Repeat 2 to 3 times.    Straight leg raise: Lie on your back with your legs straight out in front of you. Bend the knee on your uninjured side and place the foot flat on the floor. Tighten the thigh muscle on your injured side and lift your leg about 8 inches off the floor. Keep your leg straight and your thigh muscle tight. Slowly lower your leg back down to the floor. Do 2 sets of 15.    Prone hip extension: Lie on your stomach with your legs straight out behind you. Fold your arms under your head and rest your head on your arms. Draw your belly button in towards your spine and tighten your abdominal muscles. Tighten the buttocks and thigh muscles of the leg on your injured side and lift the leg off the floor about 8 inches. Keep your leg straight. Hold for 5 seconds. Then lower your leg and relax. Do 2 sets of 15.    Clam exercise: Lie on your uninjured side with your hips and knees bent and feet together. Slowly raise your top leg toward the ceiling while keeping your heels touching each  other. Hold for 2 seconds and lower slowly. Do 2 sets of 15 repetitions.    Step-up: Stand with the foot of your injured leg on a support 3 to 5 inches (8 to 13 centimeters) high --like a small step or block of wood. Keep your other foot flat on the floor. Shift your weight onto the injured leg on the support. Straighten your injured leg as the other leg comes off the floor. Return to the starting position by bending your injured leg and slowly lowering your uninjured leg back to the floor. Do 2 sets of 15.    Wall squat with a ball: Stand with your back, shoulders, and head against a wall. Look straight ahead. Keep your shoulders relaxed and your feet 3 feet (90 centimeters) from the wall and shoulder's width apart. Place a soccer or basketball-sized ball behind your back. Keeping your back against the wall, slowly squat down to a 45-degree angle. Your thighs will not yet be parallel to the floor. Hold this position for 10 seconds and then slowly slide back up the wall. Repeat 10 times. Build up to 2 sets of 15.    Knee stabilization: Wrap a piece of elastic tubing around the ankle of your uninjured leg. Tie a knot in the other end of the tubing and close it in a door at about ankle height.    Stand facing the door on the leg without tubing (your injured leg) and bend your knee slightly, keeping your thigh muscles tight. Stay in this position while you move the leg with the tubing (the uninjured leg) straight back behind you. Do 2 sets of 15.  Turn 90 degrees so the leg without tubing is closest to the door. Move the leg with tubing away from your body. Do 2 sets of 15.  Turn 90 degrees again so your back is to the door. Move the leg with tubing straight out in front of you. Do 2 sets of 15.  Turn your body 90 degrees again so the leg with tubing is closest to the door. Move the leg with tubing across your body. Do 2 sets of 15.  Hold onto a chair if you need help balancing. This exercise can be made more  challenging by standing on a firm pillow or foam mat while you move the leg with tubing.    Resisted terminal knee extension: Make a loop with a piece of elastic tubing by tying a knot in both ends. Close the knot in a door at knee height. Step into the loop with your injured leg so the tubing is around the back of your knee. Lift the other foot off the ground and hold onto a chair for balance, if needed. Bend the knee with tubing about 45 degrees. Slowly straighten your leg, keeping your thigh muscle tight as you do this. Repeat 15 times. Do 2 sets of 15. If you need an easier way to do this, stand on both legs for better support while you do the exercise.  Decline single-leg squat: Stand with both feet on an angled platform or with your heels on a board about 3 inches (8 centimeters) high. Put all of your weight on your injured leg and squat down to a 45-degree angle. Use your other leg to help you return to a standing position from the squat. You should lower your body to a squat using only your injured leg but you can use both legs to return to standing. When this exercise gets easy, hold weights in your hands to make the exercise more difficult. Do 2 sets of 15.    Developed by [employer].  Published by [employer].  Copyright  2014 [employer] and/or one of its subsidiaries. All rights reserved.    References

## 2022-06-07 NOTE — LETTER
6/7/2022      RE: Marvin Elaine  [address]  [facility] [address]     Dear Colleague,    Thank you for referring your patient, Marvin Elaine, to the [facility]. Please see a copy of my visit note below.      [facility]  SPORTS & ORTHOPEDIC CLINIC VISIT     Jun 7, 2022        ASSESSMENT & PLAN     66-year-old with bilateral knee pain that is likely multifactorial.  Some of his generalized achiness and stiffness is due to the osteoarthritis seen in the medial compartment.  However, the pain examined in the anterior superior knee with biking is likely chronic quadriceps tendinopathy.    Reviewed imaging and assessment with patient in detail  Recommended use of topical diclofenac as needed for pain  May consider use of patellar cutout knee sleeve for comfort  Provided with home exercises.  He will contact us if he would like a formal referral to physical therapy.  Follow-up.    Santiago Nelson MD  [facility]    -----  Chief Complaint   Patient presents with     Left Knee - Pain     Right Knee - Pain       SUBJECTIVE  Marvin Elaine is a/an 66 year old male who is seen as a self referral for evaluation of  Bilateral knee pain.     The patient is seen by themselves.  The patient is Right handed    Onset: 25 years(s) ago. Reports insidious onset without acute precipitating event.  Location of Pain: bilateral knee, mostly anterior and superior to the patella.  Worsened by: Walking, biking, sitting,   Better with: Biofreeze, aspirin   Treatments tried: Biofreeze, aspirin, Physical therapy previously for his patellar tendinitis  Associated symptoms: no distal numbness or tingling; denies swelling or warmth    Orthopedic/Surgical history: NO  Social History/Occupation: Retired       REVIEW OF SYSTEMS:    Do you have fever, chills, weight loss? No    Do you have any vision problems? No    Do you have  any chest pain or edema? No    Do you have any shortness of breath or wheezing?  No    Do you have stomach problems? No    Do you have any numbness or focal weakness? No    Do you have diabetes? No    Do you have problems with bleeding or clotting? No    Do you have an rashes or other skin lesions? No    OBJECTIVE:  There were no vitals taken for this visit.     Patient is alert, No acute distress, pleasant and conversational.    Gait: nonantalgic. Normal heel toe gait.    Patient is able to perform two legged squat without difficulty.    bilateral knee:   Skin intact. No erythema or ecchymosis.  No effusion or soft tissue swelling.    AROM: Zero to approximately 135  without restriction or reported pain.    Palpation: No medial or lateral facet joint tenderness.  No posterior medial or posterior lateral joint line tenderness     Special Tests:  Negative bounce test, negative forced flexion and negative Shashi's.  No ligamentous laxity or pain with valgus or varus stress.  Negative Lachman's, Anterior Drawer and Posterior Drawer     Full Isometric quad strength, extensor mechanism in place     Neurovascularly intact in the lower extremity    Hip and Ankle with full AROM and nontender      RADIOLOGY:    Three-view x-rays of the bilateral knees are performed and reviewed independently demonstrating mild medial compartment joint space loss bilaterally.  Small enthesophyte on superior pole the patella.  Otherwise no significant DJD.  No acute findings.  See EMR for formal radiology report.           Again, thank you for allowing me to participate in the care of your patient.      Sincerely,    Santiago Nelson MD

## 2022-11-19 ENCOUNTER — HEALTH MAINTENANCE LETTER (OUTPATIENT)
Age: 67
End: 2022-11-19

## 2022-12-08 ENCOUNTER — MYC MEDICAL ADVICE (OUTPATIENT)
Dept: FAMILY MEDICINE | Facility: CLINIC | Age: 67
End: 2022-12-08

## 2022-12-08 DIAGNOSIS — Z86.0100 HISTORY OF COLON POLYPS: ICD-10-CM

## 2022-12-08 DIAGNOSIS — Z12.11 SCREENING FOR COLON CANCER: Primary | ICD-10-CM

## 2022-12-17 ENCOUNTER — HEALTH MAINTENANCE LETTER (OUTPATIENT)
Age: 67
End: 2022-12-17

## 2022-12-19 ENCOUNTER — TELEPHONE (OUTPATIENT)
Dept: GASTROENTEROLOGY | Facility: CLINIC | Age: 67
End: 2022-12-19

## 2022-12-19 NOTE — TELEPHONE ENCOUNTER
Screening Questions  BLUE  KIND OF PREP RED  LOCATION [review exclusion criteria] GREEN  SEDATION TYPE    Y Are you active on mychart?   Wegener, Joel Daniel Irwin, MD  Ordering/Referring Provider?    BCBC/MEDICARE  What type of coverage do you have?  N Have you had a positive covid test in the last 14 days?    18.2  1. BMI  [BMI 40+ - review exclusion criteria]            *NEED PAC APPT AT [facility]*     SELF   2. Are you able to give consent for your medical care? [IF NO,RN REVIEW]        N  3. Are you taking any prescription pain medications on a routine schedule?        N 3a. EXTENDED PREP What kind of prescription?     N 4. Do you have any chemical dependencies such as alcohol, street drugs, or methadone?    Y - BOTH  5. Do you have any history of post-traumatic stress syndrome, severe anxiety or history of psychosis?      **If yes 3- 5 , please schedule with MAC sedation.**          IF YES TO ANY 6 - 10 - HOSPITAL SETTING ONLY.     N 6.   Do you need assistance transferring?     N 7.   Have you had a heart or lung transplant?    N 8.   Are you currently on dialysis?   N 9.   Do you use daily home oxygen?   N 10. Do you take nitroglycerin?   10a. N If yes, how often?     11. [FEMALES]   Are you currently pregnant?     11a.  If yes, how many weeks? [ Greater than 12 weeks, OR NEEDED]    N 12. [review exclusion criteria]  Do you have any implantable devices in your body (pacemaker, defib, LVAD)?            *NEED PAC APPT AT [facility]*     N 13. Do you have Pulmonary Hypertension?             *NEED PAC APPT AT [facility]*     N 14. In the past 6 months, have you had any heart related issues including cardiomyopathy or heart attack?     N 14a. If yes, did it require cardiac stenting if so when?     N 15. Have you had a stroke or Transient ischemic attack (TIA - aka  mini stroke ) within 6 months?      N 16. Do you have mod to severe Obstructive Sleep Apnea?  [Hospital only - Ok at [place]]    N 17. Do you have SEVERE AND  "UNCONTROLLED asthma?              *NEED PAC APPT AT [facility]*     N 18. Are you currently taking any blood thinners?     18a. If yes, inform patient to \"follow up w/ ordering provider for bridging instructions.\"    N 19. Do you take the medication Phentermine?    19a. If yes, \"Hold for 7 days before procedure.  Please consult your prescribing provider if you have questions about holding this medication.\"     N  20. Do you have chronic kidney disease?      N  21. Do you have a diagnosis of diabetes?     N  22. On a regular basis do you go 3-5 days between bowel movements?     23. Preferred LOCAL Pharmacy for Pre Prescription    [ LIST ONLY ONE PHARMACY]          [facility] [id number] [facility] [employer] - [place], [place] - [address]        - CLOSING REMINDERS -    Informed patient they will need an adult    Cannot take any type of public or medical transportation alone    Conscious Sedation- Needs  for 6 hours after the procedure       MAC/General-Needs  for 24 hours after procedure    Pre-Procedure Covid test to be completed [[facility] [place] PCR Testing Required]    Confirmed Nurse will call to complete assessment       - SCHEDULING DETAILS -    Additional comments:  DEBBIE BAKER   Surgeon   03/07/2023  Date of Procedure  MAC - PTSD/ANXIETY   Sedation Type     N PAC / Pre-op Required   Location  [facility]        Type of Procedure Scheduled  Lower Endoscopy [Colonoscopy]      Which Colonoscopy Prep was Sent?     STANDARD GOLYTELY-If you answer yes to questions #8, #20, #21          "

## 2022-12-29 ASSESSMENT — ENCOUNTER SYMPTOMS
DIARRHEA: 0
HEMATURIA: 0
CONSTIPATION: 0
ARTHRALGIAS: 0
CHILLS: 0
PALPITATIONS: 0
MYALGIAS: 0
DIZZINESS: 0
FREQUENCY: 0
SHORTNESS OF BREATH: 0
FEVER: 0
HEARTBURN: 0
ABDOMINAL PAIN: 0
JOINT SWELLING: 0
NAUSEA: 0
NERVOUS/ANXIOUS: 0
SORE THROAT: 0
HEADACHES: 0
DYSURIA: 0
EYE PAIN: 0
COUGH: 0
PARESTHESIAS: 0
HEMATOCHEZIA: 0
WEAKNESS: 0

## 2022-12-29 ASSESSMENT — ACTIVITIES OF DAILY LIVING (ADL): CURRENT_FUNCTION: NO ASSISTANCE NEEDED

## 2022-12-30 ENCOUNTER — OFFICE VISIT (OUTPATIENT)
Dept: FAMILY MEDICINE | Facility: CLINIC | Age: 67
End: 2022-12-30
Payer: COMMERCIAL

## 2022-12-30 VITALS
DIASTOLIC BLOOD PRESSURE: 78 MMHG | HEART RATE: 75 BPM | OXYGEN SATURATION: 100 % | BODY MASS INDEX: 19.31 KG/M2 | TEMPERATURE: 97.4 F | SYSTOLIC BLOOD PRESSURE: 124 MMHG | HEIGHT: 68 IN | RESPIRATION RATE: 16 BRPM | WEIGHT: 127.4 LBS

## 2022-12-30 DIAGNOSIS — Z79.899 MEDICATION MANAGEMENT: ICD-10-CM

## 2022-12-30 DIAGNOSIS — I10 HYPERTENSION GOAL BP (BLOOD PRESSURE) < 130/80: ICD-10-CM

## 2022-12-30 DIAGNOSIS — Z12.11 SCREEN FOR COLON CANCER: ICD-10-CM

## 2022-12-30 DIAGNOSIS — Z12.11 SCREENING FOR COLON CANCER: ICD-10-CM

## 2022-12-30 DIAGNOSIS — Z00.00 ENCOUNTER FOR MEDICARE ANNUAL WELLNESS EXAM: Primary | ICD-10-CM

## 2022-12-30 DIAGNOSIS — H91.93 BILATERAL HEARING LOSS, UNSPECIFIED HEARING LOSS TYPE: ICD-10-CM

## 2022-12-30 DIAGNOSIS — R26.81 UNSTABLE GAIT: ICD-10-CM

## 2022-12-30 DIAGNOSIS — Z12.5 SCREENING FOR PROSTATE CANCER: ICD-10-CM

## 2022-12-30 DIAGNOSIS — Z86.0100 HISTORY OF COLONIC POLYPS: ICD-10-CM

## 2022-12-30 DIAGNOSIS — F41.9 ANXIETY: ICD-10-CM

## 2022-12-30 DIAGNOSIS — E78.5 HYPERLIPIDEMIA LDL GOAL <130: ICD-10-CM

## 2022-12-30 DIAGNOSIS — T78.3XXD ANGIOEDEMA, SUBSEQUENT ENCOUNTER: ICD-10-CM

## 2022-12-30 LAB
ALBUMIN SERPL BCG-MCNC: 4.4 G/DL (ref 3.5–5.2)
ALP SERPL-CCNC: 125 U/L (ref 40–129)
ALT SERPL W P-5'-P-CCNC: 20 U/L (ref 10–50)
ANION GAP SERPL CALCULATED.3IONS-SCNC: 12 MMOL/L (ref 7–15)
AST SERPL W P-5'-P-CCNC: 30 U/L (ref 10–50)
BILIRUB SERPL-MCNC: 0.4 MG/DL
BUN SERPL-MCNC: 23.9 MG/DL (ref 8–23)
CALCIUM SERPL-MCNC: 9.5 MG/DL (ref 8.8–10.2)
CHLORIDE SERPL-SCNC: 103 MMOL/L (ref 98–107)
CHOLEST SERPL-MCNC: 167 MG/DL
CREAT SERPL-MCNC: 1.05 MG/DL (ref 0.67–1.17)
DEPRECATED HCO3 PLAS-SCNC: 26 MMOL/L (ref 22–29)
GFR SERPL CREATININE-BSD FRML MDRD: 77 ML/MIN/1.73M2
GLUCOSE SERPL-MCNC: 98 MG/DL (ref 70–99)
HDLC SERPL-MCNC: 84 MG/DL
HOLD SPECIMEN: NORMAL
HOLD SPECIMEN: NORMAL
LDLC SERPL CALC-MCNC: 75 MG/DL
NONHDLC SERPL-MCNC: 83 MG/DL
POTASSIUM SERPL-SCNC: 4.7 MMOL/L (ref 3.4–5.3)
PROT SERPL-MCNC: 6.8 G/DL (ref 6.4–8.3)
PSA SERPL-MCNC: 2.86 NG/ML (ref 0–4.5)
SODIUM SERPL-SCNC: 141 MMOL/L (ref 136–145)
TRIGL SERPL-MCNC: 40 MG/DL

## 2022-12-30 PROCEDURE — 36415 COLL VENOUS BLD VENIPUNCTURE: CPT | Performed by: FAMILY MEDICINE

## 2022-12-30 PROCEDURE — 86003 ALLG SPEC IGE CRUDE XTRC EA: CPT | Mod: 59 | Performed by: FAMILY MEDICINE

## 2022-12-30 PROCEDURE — G0103 PSA SCREENING: HCPCS | Performed by: FAMILY MEDICINE

## 2022-12-30 PROCEDURE — 99213 OFFICE O/P EST LOW 20 MIN: CPT | Mod: 25 | Performed by: FAMILY MEDICINE

## 2022-12-30 PROCEDURE — 80053 COMPREHEN METABOLIC PANEL: CPT | Performed by: FAMILY MEDICINE

## 2022-12-30 PROCEDURE — 80061 LIPID PANEL: CPT | Performed by: FAMILY MEDICINE

## 2022-12-30 PROCEDURE — 82785 ASSAY OF IGE: CPT | Performed by: FAMILY MEDICINE

## 2022-12-30 PROCEDURE — G0438 PPPS, INITIAL VISIT: HCPCS | Performed by: FAMILY MEDICINE

## 2022-12-30 RX ORDER — PAROXETINE 40 MG/1
40 TABLET, FILM COATED ORAL EVERY MORNING
COMMUNITY
Start: 2022-12-30

## 2022-12-30 RX ORDER — ATORVASTATIN CALCIUM 10 MG/1
10 TABLET, FILM COATED ORAL DAILY
Qty: 90 TABLET | Refills: 3 | Status: SHIPPED | OUTPATIENT
Start: 2022-12-30 | End: 2023-06-13

## 2022-12-30 RX ORDER — EPINEPHRINE 0.3 MG/.3ML
0.3 INJECTION SUBCUTANEOUS PRN
Qty: 2 EACH | Refills: 1 | Status: SHIPPED | OUTPATIENT
Start: 2022-12-30 | End: 2023-05-12

## 2022-12-30 ASSESSMENT — PAIN SCALES - GENERAL: PAINLEVEL: NO PAIN (0)

## 2022-12-30 ASSESSMENT — ENCOUNTER SYMPTOMS
CONSTIPATION: 0
NAUSEA: 0
CHILLS: 0
SHORTNESS OF BREATH: 0
JOINT SWELLING: 0
FREQUENCY: 0
HEMATURIA: 0
FEVER: 0
EYE PAIN: 0
HEARTBURN: 0
HEADACHES: 0
ARTHRALGIAS: 0
PALPITATIONS: 0
SORE THROAT: 0
MYALGIAS: 0
DIARRHEA: 0
DIZZINESS: 0
PARESTHESIAS: 0
NERVOUS/ANXIOUS: 0
ABDOMINAL PAIN: 0
COUGH: 0
DYSURIA: 0
HEMATOCHEZIA: 0
WEAKNESS: 0

## 2022-12-30 ASSESSMENT — ANXIETY QUESTIONNAIRES
GAD7 TOTAL SCORE: 4
GAD7 TOTAL SCORE: 4
8. IF YOU CHECKED OFF ANY PROBLEMS, HOW DIFFICULT HAVE THESE MADE IT FOR YOU TO DO YOUR WORK, TAKE CARE OF THINGS AT HOME, OR GET ALONG WITH OTHER PEOPLE?: NOT DIFFICULT AT ALL
5. BEING SO RESTLESS THAT IT IS HARD TO SIT STILL: NOT AT ALL
6. BECOMING EASILY ANNOYED OR IRRITABLE: NOT AT ALL
4. TROUBLE RELAXING: NOT AT ALL
IF YOU CHECKED OFF ANY PROBLEMS ON THIS QUESTIONNAIRE, HOW DIFFICULT HAVE THESE PROBLEMS MADE IT FOR YOU TO DO YOUR WORK, TAKE CARE OF THINGS AT HOME, OR GET ALONG WITH OTHER PEOPLE: NOT DIFFICULT AT ALL
3. WORRYING TOO MUCH ABOUT DIFFERENT THINGS: SEVERAL DAYS
7. FEELING AFRAID AS IF SOMETHING AWFUL MIGHT HAPPEN: SEVERAL DAYS
2. NOT BEING ABLE TO STOP OR CONTROL WORRYING: SEVERAL DAYS
7. FEELING AFRAID AS IF SOMETHING AWFUL MIGHT HAPPEN: SEVERAL DAYS
1. FEELING NERVOUS, ANXIOUS, OR ON EDGE: SEVERAL DAYS
GAD7 TOTAL SCORE: 4

## 2022-12-30 ASSESSMENT — PATIENT HEALTH QUESTIONNAIRE - PHQ9
10. IF YOU CHECKED OFF ANY PROBLEMS, HOW DIFFICULT HAVE THESE PROBLEMS MADE IT FOR YOU TO DO YOUR WORK, TAKE CARE OF THINGS AT HOME, OR GET ALONG WITH OTHER PEOPLE: NOT DIFFICULT AT ALL
SUM OF ALL RESPONSES TO PHQ QUESTIONS 1-9: 4
SUM OF ALL RESPONSES TO PHQ QUESTIONS 1-9: 4

## 2022-12-30 ASSESSMENT — ACTIVITIES OF DAILY LIVING (ADL): CURRENT_FUNCTION: NO ASSISTANCE NEEDED

## 2022-12-30 NOTE — PROGRESS NOTES
"SUBJECTIVE:   Nick is a 67 year old who presents for Preventive Visit.  Patient has been advised of split billing requirements and indicates understanding: Yes  Are you in the first 12 months of your Medicare coverage?  No    Healthy Habits:     In general, how would you rate your overall health?  Good    Frequency of exercise:  4-5 days/week    Duration of exercise:  15-30 minutes    Do you usually eat at least 4 servings of fruit and vegetables a day, include whole grains    & fiber and avoid regularly eating high fat or \"junk\" foods?  No    Taking medications regularly:  Yes    Medication side effects:  Other    Ability to successfully perform activities of daily living:  No assistance needed    Home Safety:  No safety concerns identified    Hearing Impairment:  Difficulty following a conversation in a noisy restaurant or crowded room, difficulty following dialogue in the theater, need to ask people to speak up or repeat themselves, find that men's voices are easier to understand than woman's, difficulty understanding soft or whispered speech and difficulty understanding speech on the telephone    In the past 6 months, have you been bothered by leaking of urine?  No    In general, how would you rate your overall mental or emotional health?  Good      PHQ-2 Total Score: 2    Answers for HPI/ROS submitted by the patient on 12/30/2022  If you checked off any problems, how difficult have these problems made it for you to do your work, take care of things at home, or get along with other people?: Not difficult at all  PHQ9 TOTAL SCORE: 4  EMBER 7 TOTAL SCORE: 4      Have you ever done Advance Care Planning? (For example, a Health Directive, POLST, or a discussion with a medical provider or your loved ones about your wishes): No, advance care planning information given to patient to review.  Patient plans to discuss their wishes with loved ones or provider.         Fall risk  Fallen 2 or more times in the past year?: " "No  Any fall with injury in the past year?: No    Cognitive Screening   1) Repeat 3 items (Leader, Season, Table)    2) Clock draw: NORMAL  3) 3 item recall: Recalls 3 objects  Results: 3 items recalled: COGNITIVE IMPAIRMENT LESS LIKELY    Mini-CogTM Copyright S Roxanna. Licensed by the author for use in [facility]; reprinted with permission ([email or web address]). All rights reserved.      Do you have sleep apnea, excessive snoring or daytime drowsiness?: no    Reviewed and updated as needed this visit by clinical staff   Tobacco  Allergies  Meds  Problems             Reviewed and updated as needed this visit by Provider                 Social History     Tobacco Use     Smoking status: Never     Smokeless tobacco: Never   Substance Use Topics     Alcohol use: No     If you drink alcohol do you typically have >3 drinks per day or >7 drinks per week? Not applicable    No flowsheet data found.        PROBLEMS TO ADD ON...  -------------------------------------  Overall feeling emotionally/physically well aside from some chronic anxiety which he states has mainly learned to deal with.  Seems worse when out in crowded places, tends to get nervous, sweat, jittery.  Also wondering if I could send prescription for alternate bowel prep \"sutab\" for upcoming colonoscopy.      Getting periodic several times/yea episodes where he feels his throat is closing, tongue seems to swell and it begins to make it hard to breath.  Takes diphenhydramine and goes away.  Wondering if should be concerned .  Has not been able to connect this to any particular trigger or food.     Finally is concerned as ages stiffness increasing, balance getting worse.  Often feels will \"fall backwards\" if walking or slips.  Interested in physical therapy.     Current providers sharing in care for this patient include:   Patient Care Team:  Wegener, Joel Daniel Irwin, MD as PCP - General (Family Practice)  ABHAY Soto MD as MD " (Dermatology)  Harpal Dalal MD as Assigned Surgical Provider  Wegener, Joel Daniel Irwin, MD as Assigned PCP  Santiago Nelson MD as Assigned Musculoskeletal Provider  Ana Paula Hernandez AuD as Audiologist (Audiology)    The following health maintenance items are reviewed in Epic and correct as of today:  Health Maintenance   Topic Date Due     COLORECTAL CANCER SCREENING  05/10/2022     MEDICARE ANNUAL WELLNESS VISIT  12/30/2023     CMP  12/30/2023     LIPID  12/30/2023     PSA  12/30/2023     ANNUAL REVIEW OF HM ORDERS  12/30/2023     FALL RISK ASSESSMENT  12/30/2023     PHQ-9  12/30/2023     DTAP/TDAP/TD IMMUNIZATION (2 - Td or Tdap) 04/28/2024     ADVANCE CARE PLANNING  12/30/2027     HEPATITIS C SCREENING  Completed     DEPRESSION ACTION PLAN  Completed     INFLUENZA VACCINE  Completed     Pneumococcal Vaccine: 65+ Years  Completed     ZOSTER IMMUNIZATION  Completed     AORTIC ANEURYSM SCREENING (SYSTEM ASSIGNED)  Completed     COVID-19 Vaccine  Completed     IPV IMMUNIZATION  Aged Out     MENINGITIS IMMUNIZATION  Aged Out               Review of Systems   Constitutional: Negative for chills and fever.   HENT: Positive for hearing loss. Negative for congestion, ear pain and sore throat.    Eyes: Negative for pain and visual disturbance.   Respiratory: Negative for cough and shortness of breath.    Cardiovascular: Negative for chest pain, palpitations and peripheral edema.   Gastrointestinal: Negative for abdominal pain, constipation, diarrhea, heartburn, hematochezia and nausea.   Genitourinary: Positive for impotence. Negative for dysuria, frequency, genital sores, hematuria, penile discharge and urgency.   Musculoskeletal: Negative for arthralgias, joint swelling and myalgias.   Skin: Negative for rash.   Neurological: Negative for dizziness, weakness, headaches and paresthesias.   Psychiatric/Behavioral: Negative for mood changes. The patient is not nervous/anxious.          OBJECTIVE:   BP  "124/78   Pulse 75   Temp 97.4  F (36.3  C) (Temporal)   Resp 16   Ht 1.734 m (5' 8.25\")   Wt 57.8 kg (127 lb 6.4 oz)   SpO2 100%   BMI 19.23 kg/m   Estimated body mass index is 19.23 kg/m  as calculated from the following:    Height as of this encounter: 1.734 m (5' 8.25\").    Weight as of this encounter: 57.8 kg (127 lb 6.4 oz).  Physical Exam  GENERAL: healthy, alert and no distress  EYES: Eyes grossly normal to inspection, PERRL and conjunctivae and sclerae normal  HENT: ear canals and TM's normal, nose and mouth without ulcers or lesions  NECK: no adenopathy, no asymmetry, masses, or scars and thyroid normal to palpation  RESP: lungs clear to auscultation - no rales, rhonchi or wheezes  CV: regular rate and rhythm, normal S1 S2, no S3 or S4, no murmur, click or rub, no peripheral edema and peripheral pulses strong  ABDOMEN: soft, nontender, no hepatosplenomegaly, no masses and bowel sounds normal  MS: no gross musculoskeletal defects noted, no edema  SKIN: no suspicious lesions or rashes  NEURO: Normal strength and tone, mentation intact and speech normal  PSYCH: mentation appears normal, affect normal/bright        ASSESSMENT / PLAN:   (Z86.010) History of colonic polyps  (primary encounter diagnosis)  Comment: I recommended he contact dr. watson's office re: sutab colonoscopy prep as they generally handle bowel prep directions.   Plan:     (Z00.00) Encounter for Medicare annual wellness exam  Comment:   Plan: PRIMARY CARE FOLLOW-UP SCHEDULING, REVIEW OF         HEALTH MAINTENANCE PROTOCOL ORDERS            (I10) Hypertension goal BP (blood pressure) < 130/80  Comment: elected trial of propranolol to ensure more consistent normal blood pressure low dose and also to see if this helps some with anxiety in situations as described above.   Plan: propranolol ER (INDERAL LA) 60 MG 24 hr capsule            (F41.9) Anxiety  Comment:   Plan: propranolol ER (INDERAL LA) 60 MG 24 hr capsule            (Z12.11) " Screen for colon cancer  Comment:   Plan:         (E78.5) Hyperlipidemia LDL goal <130  Comment:   Plan: Lipid panel reflex to direct LDL Non-fasting,         atorvastatin (LIPITOR) 10 MG tablet            (Z12.5) Screening for prostate cancer  Comment:   Plan: PROSTATE SPEC ANTIGEN SCREEN            (Z79.899) Medication management  Comment:   Plan: COMPREHENSIVE METABOLIC PANEL            (T78.3XXD) Angioedema, subsequent encounter  Comment:   Given his description I would recommend allergy testing and to have an epi-pen on hand.      If unable to find cause consider allergist referral.     He will monitor potential triggers such as peanuts, strawberries, etc.     Plan: Allergy adult food panel, [facility] Allergen        Panel, EPINEPHrine (ANY BX GENERIC EQUIV) 0.3         MG/0.3ML injection 2-pack            (H91.93) Bilateral hearing loss, unspecified hearing loss type  Comment:   Plan: Adult Audiology  Referral            (R26.81) Unstable gait  Comment:   Plan: Physical Therapy Referral              Patient has been advised of split billing requirements and indicates understanding: Yes  In addition to preventive health exam and counseling 28 minutes spent on the date of the encounter doing chart review, history and exam, negotiating and explaining the plan with the patient, documentation and further activities as noted above.           COUNSELING:  Reviewed preventive health counseling, as reflected in patient instructions       Regular exercise       Healthy diet/nutrition       Colon cancer screening       Prostate cancer screening        He reports that he has never smoked. He has never used smokeless tobacco.      Appropriate preventive services were discussed with this patient, including applicable screening as appropriate for cardiovascular disease, diabetes, osteopenia/osteoporosis, and glaucoma.  As appropriate for age/gender, discussed screening for colorectal cancer, prostate cancer,  breast cancer, and cervical cancer. Checklist reviewing preventive services available has been given to the patient.    Reviewed patients plan of care and provided an AVS. The Basic Care Plan (routine screening as documented in Health Maintenance) for Marvin meets the Care Plan requirement. This Care Plan has been established and reviewed with the Patient.          Joel Daniel Wegener, MD  [facility]

## 2022-12-30 NOTE — PATIENT INSTRUCTIONS
Message GI about sutab      Patient Education   Personalized Prevention Plan  You are due for the preventive services outlined below.  Your care team is available to assist you in scheduling these services.  If you have already completed any of these items, please share that information with your care team to update in your medical record.  Health Maintenance Due   Topic Date Due    Comprehensive Metabolic Panel  12/21/2019    Colorectal Cancer Screening  05/10/2022    Cholesterol Lab  10/21/2022    Prostate Test  10/21/2022       Understanding USDA MyPlate  The USDA has guidelines to help you make healthy food choices. These are called MyPlate. MyPlate shows the food groups that make up healthy meals using the image of a place setting. Before you eat, think about the healthiest choices for what to put on your plate or in your cup or bowl. To learn more about building a healthy plate, visit www.chooseValetAnywhereplate.gov.    The food groups  Fruits. Any fruit or 100% fruit juice counts as part of the Fruit Group. Fruits may be fresh, canned, frozen, or dried, and may be whole, cut-up, or pureed. Make 1/2 of your plate fruits and vegetables.  Vegetables. Any vegetable or 100% vegetable juice counts as a member of the Vegetable Group. Vegetables may be fresh, frozen, canned, or dried. They can be served raw or cooked and may be whole, cut-up, or mashed. Make 1/2 of your plate fruits and vegetables.  Grains. All foods made from grains are part of the Grains Group. These include wheat, rice, oats, cornmeal, and barley. Grains are often used to make foods such as bread, pasta, oatmeal, cereal, tortillas, and grits. Grains should be no more than 1/4 of your plate. At least half of your grains should be whole grains.  Protein. This group includes meat, poultry, seafood, beans and peas, eggs, processed soy products (such as tofu), nuts (including nut butters), and seeds. Make protein choices no more than 1/4 of your plate. Meat and  poultry choices should be lean or low fat.  Dairy. The Dairy Group includes all fluid milk products and foods made from milk that contain calcium, such as yogurt and cheese. (Foods that have little calcium, such as cream, butter, and cream cheese, are not part of this group.) Most dairy choices should be low-fat or fat-free.  Oils. Oils aren't a food group, but they do contain essential nutrients. However it's important to watch your intake of oils. These are fats that are liquid at room temperature. They include canola, corn, olive, soybean, vegetable, and sunflower oil. Foods that are mainly oil include mayonnaise, certain salad dressings, and soft margarines. You likely already get your daily oil allowance from the foods you eat.  Things to limit  Eating healthy also means limiting these things in your diet:     Salt (sodium). Many processed foods have a lot of sodium. To keep sodium intake down, eat fresh vegetables, meats, poultry, and seafood when possible. Purchase low-sodium, reduced-sodium, or no-salt-added food products at the store. And don't add salt to your meals at home. Instead, season them with herbs and spices such as dill, oregano, cumin, and paprika. Or try adding flavor with lemon or lime zest and juice.  Saturated fat. Saturated fats are most often found in animal products such as beef, pork, and chicken. They are often solid at room temperature, such as butter. To reduce your saturated fat intake, choose leaner cuts of meat and poultry. And try healthier cooking methods such as grilling, broiling, roasting, or baking. For a simple lower-fat swap, use plain nonfat yogurt instead of mayonnaise when making potato salad or macaroni salad.  Added sugars. These are sugars added to foods. They are in foods such as ice cream, candy, soda, fruit drinks, sports drinks, energy drinks, cookies, pastries, jams, and syrups. Cut down on added sugars by sharing sweet treats with a family member or friend. You  can also choose fruit for dessert, and drink water or other unsweetened beverages.     StayWell last reviewed this educational content on 6/1/2020 2000-2021 [employer]Well [employer]. All rights reserved. This information is not intended as a substitute for professional medical care. Always follow your healthcare professional's instructions.          Signs of Hearing Loss      Hearing much better with one ear can be a sign of hearing loss.   Hearing loss is a problem shared by many people. In fact, it is one of the most common health problems, particularly as people age. Most people age 65 and older have some hearing loss. By age 80, almost everyone does. Hearing loss often occurs slowly over the years. So you may not realize your hearing has gotten worse.  Have your hearing checked  Call your healthcare provider if you:  Have to strain to hear normal conversation  Have to watch other people s faces very carefully to follow what they re saying  Need to ask people to repeat what they ve said  Often misunderstand what people are saying  Turn the volume of the television or radio up so high that others complain  Feel that people are mumbling when they re talking to you  Find that the effort to hear leaves you feeling tired and irritated  Notice, when using the phone, that you hear better with one ear than the other  [employer] last reviewed this educational content on 1/1/2020 2000-2021 [employer]. All rights reserved. This information is not intended as a substitute for professional medical care. Always follow your healthcare professional's instructions.

## 2023-01-01 NOTE — MR AVS SNAPSHOT
After Visit Summary   3/1/2017    Marvin Elaine    MRN: [id number]           Patient Information     Date Of Birth          1955        Visit Information        Provider Department      3/1/2017 3:15 PM ABHAY Soto MD [facility]        Today's Diagnoses     Inflamed seborrheic keratosis    -  1       Follow-ups after your visit        Who to contact     Please call your clinic at [phone] to:    Ask questions about your health    Make or cancel appointments    Discuss your medicines    Learn about your test results    Speak to your doctor   If you have compliments or concerns about an experience at your clinic, or if you wish to file a complaint, please contact [facility] Patient Relations at [phone] or email us at [email or web address]         Additional Information About Your Visit        MyChart Information     [employer] gives you secure access to your electronic health record. If you see a primary care provider, you can also send messages to your care team and make appointments. If you have questions, please call your primary care clinic.  If you do not have a primary care provider, please call [phone] and they will assist you.      LoopPay is an electronic gateway that provides easy, online access to your medical records. With LoopPay, you can request a clinic appointment, read your test results, renew a prescription or communicate with your care team.     To access your existing account, please contact your [facility] or call [phone] for assistance.        Care EveryWhere ID     This is your Care EveryWhere ID. This could be used by other organizations to access your [place] medical records  [id number]         Blood Pressure from Last 3 Encounters:   No data found for BP    Weight from Last 3 Encounters:   No data found for Wt              Today, you had the following     No orders  found for display       Primary Care Provider Office Phone # Fax #    Man Daniel Irwin Wegener, [phone]       [address] [address]        Thank you!     Thank you for choosing [facility]  for your care. Our goal is always to provide you with excellent care. Hearing back from our patients is one way we can continue to improve our services. Please take a few minutes to complete the written survey that you may receive in the mail after your visit with us. Thank you!             Your Updated Medication List - Protect others around you: Learn how to safely use, store and throw away your medicines at www.disposemymeds.org.          This list is accurate as of: 3/1/17 11:59 PM.  Always use your most recent med list.                   Brand Name Dispense Instructions for use    ALPRAZOLAM PO      Take 0.25 mg by mouth       ASPIRIN PO      Take 81 mg by mouth daily       latanoprost 0.005 % ophthalmic solution    XALATAN     Place 1 drop Into the left eye daily       Multi-vitamin Tabs tablet      Take 1 tablet by mouth daily       VIIBRYD 40 MG Tabs tablet   Generic drug:  vilazodone      40 mg       VITAMIN D (CHOLECALCIFEROL) PO      Take 1,000 Units by mouth daily Vitamin D       WELLBUTRIN 100 MG tablet   Generic drug:  buPROPion     90 tablet    Take 100 mg by mouth 2 times daily          Statement Selected

## 2023-01-02 LAB
A ALTERNATA IGE QN: <0.1 KU(A)/L
A FUMIGATUS IGE QN: <0.1 KU(A)/L
ALMOND IGE QN: <0.1 KU(A)/L
BERMUDA GRASS IGE QN: <0.1 KU(A)/L
C HERBARUM IGE QN: <0.1 KU(A)/L
CASHEW NUT IGE QN: <0.1 KU(A)/L
CAT DANDER IGG QN: <0.1 KU(A)/L
CEDAR IGE QN: <0.1 KU(A)/L
CODFISH IGE QN: <0.1 KU(A)/L
COMMON RAGWEED IGE QN: <0.1 KU(A)/L
COTTONWOOD IGE QN: <0.1 KU(A)/L
COW MILK IGE QN: <0.1 KU(A)/L
D FARINAE IGE QN: <0.1 KU(A)/L
D PTERONYSS IGE QN: <0.1 KU(A)/L
DOG DANDER+EPITH IGE QN: <0.1 KU(A)/L
EGG WHITE IGE QN: <0.1 KU(A)/L
HAZELNUT IGE QN: <0.1 KU(A)/L
IGE SERPL-ACNC: 38 KU/L (ref 0–114)
IGE SERPL-ACNC: 38 KU/L (ref 0–114)
MAPLE IGE QN: <0.1 KU(A)/L
MARSH ELDER IGE QN: <0.1 KU(A)/L
MOUSE URINE PROT IGE QN: <0.1 KU(A)/L
NETTLE IGE QN: <0.1 KU(A)/L
P NOTATUM IGE QN: <0.1 KU(A)/L
PEANUT IGE QN: <0.1 KU(A)/L
ROACH IGE QN: <0.1 KU(A)/L
SALMON IGE QN: <0.1 KU(A)/L
SALTWORT IGE QN: <0.1 KU(A)/L
SCALLOP IGE QN: <0.1 KU(A)/L
SESAME SEED IGE QN: <0.1 KU(A)/L
SHRIMP IGE QN: <0.1 KU(A)/L
SILVER BIRCH IGE QN: <0.1 KU(A)/L
SOYBEAN IGE QN: <0.1 KU(A)/L
TIMOTHY IGE QN: <0.1 KU(A)/L
TUNA IGE QN: <0.1 KU(A)/L
WALNUT IGE QN: <0.1 KU(A)/L
WHEAT IGE QN: <0.1 KU(A)/L
WHITE ASH IGE QN: <0.1 KU(A)/L
WHITE ELM IGE QN: <0.1 KU(A)/L
WHITE MULBERRY IGE QN: <0.1 KU(A)/L
WHITE OAK IGE QN: <0.1 KU(A)/L

## 2023-01-04 ENCOUNTER — MYC MEDICAL ADVICE (OUTPATIENT)
Dept: FAMILY MEDICINE | Facility: CLINIC | Age: 68
End: 2023-01-04

## 2023-01-05 ENCOUNTER — TELEPHONE (OUTPATIENT)
Dept: AUDIOLOGY | Facility: CLINIC | Age: 68
End: 2023-01-05

## 2023-01-05 RX ORDER — PROPRANOLOL HCL 60 MG
60 CAPSULE, EXTENDED RELEASE 24HR ORAL DAILY
Qty: 90 CAPSULE | Refills: 3 | Status: SHIPPED | OUTPATIENT
Start: 2023-01-05 | End: 2023-03-26

## 2023-01-05 NOTE — TELEPHONE ENCOUNTER
LVM for patient letting him know that the we have time tomorrow for the hearing test and hearing aid check at the same time as the patient after him cancelled.     If he would prefer to reschedule he can. Provided both main line for scheduling and direct provider line.     Krissy Alamo. CCC-A  Licensed Audiologist   [place] #[id number]

## 2023-01-06 ENCOUNTER — OFFICE VISIT (OUTPATIENT)
Dept: AUDIOLOGY | Facility: CLINIC | Age: 68
End: 2023-01-06
Attending: FAMILY MEDICINE
Payer: COMMERCIAL

## 2023-01-06 DIAGNOSIS — H90.3 SENSORINEURAL HEARING LOSS (SNHL) OF BOTH EARS: Primary | ICD-10-CM

## 2023-01-06 DIAGNOSIS — H91.93 BILATERAL HEARING LOSS, UNSPECIFIED HEARING LOSS TYPE: ICD-10-CM

## 2023-01-06 PROCEDURE — 92557 COMPREHENSIVE HEARING TEST: CPT | Performed by: AUDIOLOGIST

## 2023-01-06 PROCEDURE — 92595 PR ELECTRO ACOUSTIC HEARING AID TEST BINAURAL: CPT | Performed by: AUDIOLOGIST

## 2023-01-06 PROCEDURE — 92550 TYMPANOMETRY & REFLEX THRESH: CPT | Performed by: AUDIOLOGIST

## 2023-01-06 NOTE — PROGRESS NOTES
AUDIOLOGY REPORT    SUBJECTIVE:  Marvin Elaine is a 67 year old male who was seen in the Audiology Clinic at the [facility] for audiologic evaluation, referred by Joel Wegener,M.D.. They were not accompanied today by anyone. The patient has not been seen previously in this clinic for assessment. The patient reports that he has bilateral -in-the-ear hearing aids fit at [facility] a year or so ago. The patient reports a possible decrease in hearing, but is unsure if hearing has changed, if hearing aids are not functioning properly or both. Patient reports bilateral tinnitus. He notes itching at times. He denies aural fullness, otalgia, otorrhea, dizziness, ear surgeries, or noise exposure. The patient notes difficulty with communication in a variety of listening situations.     OBJECTIVE:  Abuse Screening:  Do you feel unsafe at home or work/school? No  Do you feel threatened by someone? No  Does anyone try to keep you from having contact with others, or doing things outside of your home? No  Physical signs of abuse present? No     Fall Risk Screen:  1. Have you fallen two or more times in the past year? No  2. Have you fallen and had an injury in the past year? No    Otoscopic exam indicates ears are clear of cerumen bilaterally     Pure Tone Thresholds assessed using conventional audiometry with good  reliability from 250-8000 Hz bilaterally using insert earphones and circumaural headphones     RIGHT:  normal sloping to moderate-severe sensorineural hearing loss    LEFT:    normal sloping to moderate-severe sensorineural hearing loss    Tympanogram:    RIGHT: normal eardrum mobility    LEFT:   normal eardrum mobility    Reflexes (reported by stimulus ear):  RIGHT: Ipsilateral is present at normal levels  RIGHT: Contralateral is present at normal levels  LEFT:   Ipsilateral is present at normal levels  LEFT:   Contralateral is present at normal  levels      Speech Reception Threshold:    RIGHT: 40 dB HL    LEFT:   40 dB HL  Word Recognition Score:     RIGHT: 88% at 80 dB HL using NU-6 recorded word list.    LEFT:   92% at 80 dB HL using NU-6 recorded word list.    Discussed with patient that we are unable to make programming changes to his hearing aids, as [employer] uses a proprietary software that only [employer] has access to. However, we can clean and check the hearing aids. We can also run a test to ensure they are functioning properly and compare setting to his results from today. Patient is in agreement with this. Therefore, hearing aids were cleaned and checked. Hearing aids were deep cleaned; microphone ports were vacuumed, wax filters, and domes were changed. Following cleaning, listening check was good, but quiet.     Electroacoustic analysis was completed and found device to be functioning properly and minimal distortion was noted. Simulated real-ear-probe-microphone measurements were completed on the [employer] system and were significantly below NAL-NL1 targets. Explained results of measurements to patient. Discussed that where hearing aids are currently programmed they are not only below target, but below threshold as well and not providing adequate amplification. Explained to patient that is likely why his not noticing any benefit when wearing the devices. Patient expressed understanding.     ASSESSMENT: Today's evaluation indicates a bilateral sensorineural hearing loss. There are no previous audiograms available for comparison. Today s results were discussed with the patient in detail and he was provided with a copy. A hearing aid cleaning and listening check and electroacoustic analysis was completed today.    PLAN:  Patient was counseled regarding hearing loss and impact on communication. Patient is still a good candidate for amplification at this time. It is recommended that the patient return to previous hearing aid provider for  reprogramming of his devices to meet his current hearing thresholds. It is recommended that Marvin recheck hearing if changes are noted or concerns arise. Please call this clinic with questions regarding these results or recommendations.      Krissy Alamo. CCC-A  Licensed Audiologist   [place] #[id number]

## 2023-02-22 ENCOUNTER — TELEPHONE (OUTPATIENT)
Dept: GASTROENTEROLOGY | Facility: CLINIC | Age: 68
End: 2023-02-22
Payer: COMMERCIAL

## 2023-02-22 DIAGNOSIS — Z86.0100 HISTORY OF COLONIC POLYPS: Primary | ICD-10-CM

## 2023-02-22 RX ORDER — BISACODYL 5 MG/1
TABLET, DELAYED RELEASE ORAL
Qty: 4 TABLET | Refills: 0 | Status: SHIPPED | OUTPATIENT
Start: 2023-02-22 | End: 2023-05-12

## 2023-02-22 NOTE — TELEPHONE ENCOUNTER
Patient scheduled for Colonoscopy  on 3.7.23.     Discuss Covid policy.     Pre op exam scheduled: N/A    Arrival time: 1015. Procedure time 1115    Facility location: [facility]; [address] [place] [address], [address] Floor, [place] [address]    Sedation type: MAC    Anticoagulations? No    Electronic implanted devices? No    Diabetic? No    Indication for procedure: Hx polyps    Bowel prep recommendation: Standard Golytely     Prep instructions sent via [employer] Bowel prep script sent to    [facility] [address] [facility] [employer] - [place], [place] - [address]      Pre visit planning completed.    Odessa Waldrop RN  Endoscopy Procedure Pre Assessment RN

## 2023-02-22 NOTE — TELEPHONE ENCOUNTER
Attempted to contact patient regarding upcoming Colonoscopy  procedure on 3.7.23 for pre assessment questions. No answer.     Left message to return call to [phone] #[phone]      Odessa Waldrop RN  Endoscopy Procedure Pre Assessment RN

## 2023-02-24 ENCOUNTER — MYC MEDICAL ADVICE (OUTPATIENT)
Dept: DERMATOLOGY | Facility: CLINIC | Age: 68
End: 2023-02-24
Payer: COMMERCIAL

## 2023-02-27 NOTE — TELEPHONE ENCOUNTER
Pt returned call.    Pre assessment questions completed for upcoming Colonoscopy  procedure scheduled on 3/7/23    COVID policy reviewed.     Pre-op exam? N/A    Reviewed procedural arrival time 1015, procedure time 1115 and facility location [facility]; [address] [place] [address], [address] Floor, [place] [address]    Designated  policy reviewed. Instructed to have someone stay 24 hours post procedure.     Bowel prep recommendation: Sutab requested per pt (msg sent to Dr. Franco asking for approval)  Pt refused Golytely stating he cannot drink it all and was very nauseous last time.  Pt willing to pay additional cost for Sutab even if not covered under his insurance plan.    Did discuss holding fiber/iron supplement and stopping corn, nuts, and food with seeds 7 days prior to procedure.     [facility] will need to order and review Sutab prep with patient if approved by Dr. Franco (awaiting response).    Patient verbalized understanding and had no questions or concerns at this time.      Ilsa Diaz RN  Endoscopy Procedure Pre Assessment RN

## 2023-02-28 ENCOUNTER — PATIENT OUTREACH (OUTPATIENT)
Dept: GASTROENTEROLOGY | Facility: CLINIC | Age: 68
End: 2023-02-28
Payer: COMMERCIAL

## 2023-02-28 DIAGNOSIS — Z86.0100 HISTORY OF COLONIC POLYPS: Primary | ICD-10-CM

## 2023-02-28 RX ORDER — SODIUM, POTASSIUM,MAG SULFATES 17.5-3.13G
1 SOLUTION, RECONSTITUTED, ORAL ORAL 2 TIMES DAILY
Qty: 354 ML | Refills: 0 | Status: SHIPPED | OUTPATIENT
Start: 2023-02-28 | End: 2023-05-12

## 2023-02-28 NOTE — PROGRESS NOTES
Standard SUPREP Bowel Prep Kit  Prep Instructions for your Colonoscopy    Please read these instructions carefully at least 7 days prior to your colonoscopy procedure. Be sure to follow all directions completely. The inside of your colon must be clean to allow for a complete examination for the presence of any growths, polyps, and/or abnormalities, as well as their biopsy or removal. A number of tips are included in order to make this part of the procedure as comfortable as possible.    Getting ready:     A nurse will call you within a week of your exam to review the prep instructions, and answer any other questions you have.     You must arrange for an adult to drive you home after your exam. Your colonoscopy cannot be done unless you have a ride. If you need to use public transportation, someone must ride with you and stay with you for a minimum of 24 hours.    Check with your insurance company to be sure they will cover this exam and bowel prep type.    Go to the drug store and :    Two (2) six (6) ounce bottles of SUPREP (these have been prescribed to your pharmacy)     7 days before the exam:    Talk to your doctor:  If you take blood-thinners (such as Coumadin, Plavix, Xarelto), these medications may need to be temporarily stopped before your procedure. Your prescribing doctor will give you directions.     Stop taking fiber and iron supplements     Stop eating corn, popcorn, nuts and foods that contain seeds. These can stay in the colon for many days and they can clog up the colonoscope.     3 days before the exam:    Begin a low-fiber diet (see below).     Drink at least 4 to 6 large glasses of water or sports drink (not red or purple) each day.     Stop taking NSAID pain relievers, such as Advil, ibuprofen, Aleve, Naproxen, Motrin. You may take Tylenol.      One day before the exam:    Only clear liquid diet is allowed (see below). No solid food should be eaten.     Drink at least 8 to 10 full glasses  of clear liquids during the day.     You will start drinking the colonoscopy prep solution around ~ 4 PM. The solution is taken in two steps. The timing of this step depends on your appointment arrival time.    Once you start drinking the solution, stay near a toilet.    Besides diarrhea (watery stools), you may have mild cramping, bloating and nausea.      You may want to use Vaseline on the skin around your anus after each bowel movement to prevent irritation. You can also use wet wipes to prevent irritation.        Step 1: At 4 pm, pour one (1) 6-ounce bottle of SUPREP liquid into mixing container (provided for you).     Step 2: Add cool water to the 16-ounce fill line on the container and mix together.     Step 3: Drink all the liquid in the container.     Step 4: Drink two (2) more 16-ounce containers of water over the next 1 hour.    You can put some petroleum jelly (Vaseline) on the skin around your anus after each bowel movement to prevent irritation. You can also use wet wipes.     Continue to drink clear liquids.      Step Two:   If you arrive for your procedure before 11 AM:      At 8 PM, open the second 6-ounce bottle of SUPREP.       Repeat step 1 to step 4 again.    If you arrive for your procedure after 11 AM:      At 6 AM on the day of the exam open the second 6-ounce bottle of SUPREP.      Repeat step 1 to step 4 again.      Day of exam:    You may take your necessary morning medications with sips of water    Do not take diabetes medicine by mouth until after your exam.    You may drink clear liquids only up until 2 hours before your arrival time.    Do not smoke, chew tobacco, or swallow anything, including water or gum for at least 2 hours before your arrival time. This is a safety issue. Your procedure could be cancelled if you do not follow directions.    Please arrive with a responsible adult who can take you home after the test and stay with you for a minimum of 24 hours: The medicine used  will make you sleepy and forgetful. If you do not have someone to take you home, we will cancel your procedure. If using public transportation you must have someone to ride with you.    Please perform your nebulizer treatments and airway clearance therapy in the morning prior to the procedure (if applicable).    If you have asthma, bring your inhalers.    CLEAR LIQUIDS   You may have:      Water, tea, coffee (no milk or cream)    Soda pop, Gatorade (not red or purple)    Jell-O, Popsicles (no milk or fruit pieces - not red or purple)    Fat-free soup broth or bouillon    Plain hard candy, such as clear life savers (not red or purple)    Clear juices and fruit-flavored drinks, such as apple juice, white grape juice, Hi-C, and Juan-Aid (not red or purple)   Do not have:      Milk or milk products such as ice cream, malts or shakes, or coffee creamer    Red or purple drinks of any kind such as cranberry juice or grape juice. Avoid red or purple Jell-O, Popsicles, Juan-Aid, sorbet, sherbet and candy    Juices with pulp such as orange, grapefruit, pineapple or tomato juice    Cream soups of any kind    Alcohol and beer    Protein drinks or protein powder       LOW FIBER DIET   You may have:      Starches: White bread, rolls, biscuits, croissants, [place] toast, white flour tortillas, waffles, pancakes, Welsh toast; white rice, noodles, pasta, macaroni; cooked and peeled potatoes; plain crackers, saltines; cooked farina or cream of rice; puffed rice, corn flakes, Rice Krispies, Special K     Vegetables: tender cooked and canned, vegetable broths    Fruits and fruit juices: Strained fruit juice, canned fruit without seeds or skin (not pineapple), applesauce, pear sauce, ripe bananas, melons (not watermelon)     Milk products: Milk (plain or flavored), cheese, cottage cheese, yogurt (no berries), custard, ice cream      Proteins: Tender, well-cooked ground beef, lamb, veal, ham, pork, chicken, turkey, fish or organ meats;  eggs; creamy peanut butter     Fats and condiments:  Margarine, butter, oils, mayonnaise, sour cream, salad dressing, plain gravy; spices, cooked herbs; sugar, clear jelly, honey, syrup     Snacks, sweets and drinks: Pretzels, hard candy; plain cakes and cookies (no nuts or seeds); gelatin, plain pudding, sherbet, Popsicles; coffee, tea, carbonated ( fizzy ) drinks Do not have:      Starches: Breads or rolls that contain nuts, seeds or fruit; whole wheat or whole grain breads that contain more than 1 gram of fiber per slice; cornbread; corn or whole wheat tortillas; potatoes with skin; brown rice, wild rice, kasha (buckwheat), and oatmeal     Vegetables: Any raw or steamed vegetables; vegetables with seeds; corn in any form     Fruits and fruit juices: Prunes, prune juice, raisins and other dried fruits, berries and other fruits with seeds, canned pineapple juices with pulp such as orange, grapefruit, pineapple or tomato juice    Milk products: Any yogurt with nuts, seeds or berries     Proteins: Tough, fibrous meats with gristle; cooked dried beans, peas or lentils; crunchy peanut butter    Fats and condiments: Pickles, olives, relish, horseradish; jam, marmalade, preserves     Snacks, sweets and drinks: Popcorn, nuts, seeds, granola, coconut, candies made with nuts or seeds; all desserts that contain nuts, seeds, raisins and other dried fruits, coconut, whole grains or bran.      FAQ:      How do you know if your colon is cleaned out?   o After completing the bowel prep, your bowel movements should be all liquid and yellow. Your bowel movements will look similar to urine in the toilet. If there are pieces of stool (poop) in the toilet, or if you can't see to the bottom of the toilet, please call our office for advice. Call [phone] and ask to speak with a nurse.     Why do you need a responsible  to take you home and stay with you?  o We require a responsible adult to take you home for your safety. The  sedation medicines used to relax you during the procedure can impair your judgement and reaction time, and make you forgetful and possibly a little unsteady. Do not drive, make any important decisions, or sign any legal documents for 24 hours after your procedure.     It is normal to feel bloated and gassy after your procedure. Walking will help move the air through your colon. You can take non-aspirin pain relievers that contain acetaminophen (Tylenol).       When can you eat after your procedure?  o You may resume your normal diet when you feel ready, unless advised otherwise by the doctor performing your procedure. Do not drink alcohol for 24 hours after your procedure.     You many resume normal activities (work, exercise, etc.) after 24 hours.       When will you get test results?  o You should have your procedure results and any lab results (if applicable) by letter, MyChart message, or phone call within 2 weeks. If you have any questions, please call the doctor that referred you for the procedure.     Updated: 01/02/2023

## 2023-02-28 NOTE — TELEPHONE ENCOUNTER
Sutab approved by Dr. Franco.    Bozena Franco MD Mork, JUNIE Rosenbaum  Cc: Jaimie Merritt RN  Sutab is fine with me.     Thanks.    Sent message to LENNOX Etienne to send in Sutab prescription to pharmacy, send and review prep instructions with patient.      Ilsa Diaz, JUNIE  Endoscopy Procedure Pre-Assessment RN

## 2023-02-28 NOTE — PROGRESS NOTES
Spoke with Nick to review the instructions.   Nick read through the instructions and verbalized understanding

## 2023-03-01 NOTE — TELEPHONE ENCOUNTER
[facility] sent Suprep instead of Sutab.    Jaimie Merritt RN Mork, JUNIE Rosenbaum   I spoke with Dr. Franco and the patient and both are ok with it.   Jaimie     Suprep Rx sent, Suprep instructions sent, and prep reviewed with patient per LENNOX Etienne.    Per LENNOX Etienne, both patient and Dr. Franco are OK with Suprep instead of Sutab.      Ilsa Daiz, RN  Endoscopy Procedure Pre-Assessment RN

## 2023-03-06 RX ORDER — NALOXONE HYDROCHLORIDE 0.4 MG/ML
0.2 INJECTION, SOLUTION INTRAMUSCULAR; INTRAVENOUS; SUBCUTANEOUS
Status: CANCELLED | OUTPATIENT
Start: 2023-03-06

## 2023-03-06 RX ORDER — NALOXONE HYDROCHLORIDE 0.4 MG/ML
0.4 INJECTION, SOLUTION INTRAMUSCULAR; INTRAVENOUS; SUBCUTANEOUS
Status: CANCELLED | OUTPATIENT
Start: 2023-03-06

## 2023-03-06 RX ORDER — FLUMAZENIL 0.1 MG/ML
0.2 INJECTION, SOLUTION INTRAVENOUS
Status: CANCELLED | OUTPATIENT
Start: 2023-03-06 | End: 2023-03-07

## 2023-03-06 RX ORDER — PROCHLORPERAZINE MALEATE 5 MG
5 TABLET ORAL EVERY 6 HOURS PRN
Status: CANCELLED | OUTPATIENT
Start: 2023-03-06

## 2023-03-06 RX ORDER — ONDANSETRON 2 MG/ML
4 INJECTION INTRAMUSCULAR; INTRAVENOUS EVERY 6 HOURS PRN
Status: CANCELLED | OUTPATIENT
Start: 2023-03-06

## 2023-03-06 RX ORDER — ONDANSETRON 4 MG/1
4 TABLET, ORALLY DISINTEGRATING ORAL EVERY 6 HOURS PRN
Status: CANCELLED | OUTPATIENT
Start: 2023-03-06

## 2023-03-07 ENCOUNTER — ANESTHESIA EVENT (OUTPATIENT)
Dept: SURGERY | Facility: AMBULATORY SURGERY CENTER | Age: 68
End: 2023-03-07
Payer: COMMERCIAL

## 2023-03-07 ENCOUNTER — HOSPITAL ENCOUNTER (OUTPATIENT)
Facility: AMBULATORY SURGERY CENTER | Age: 68
Discharge: HOME | End: 2023-03-07
Attending: INTERNAL MEDICINE
Payer: COMMERCIAL

## 2023-03-07 ENCOUNTER — ANESTHESIA (OUTPATIENT)
Dept: SURGERY | Facility: AMBULATORY SURGERY CENTER | Age: 68
End: 2023-03-07
Payer: COMMERCIAL

## 2023-03-07 VITALS
DIASTOLIC BLOOD PRESSURE: 70 MMHG | SYSTOLIC BLOOD PRESSURE: 112 MMHG | OXYGEN SATURATION: 99 % | BODY MASS INDEX: 19.31 KG/M2 | RESPIRATION RATE: 16 BRPM | HEIGHT: 68 IN | HEART RATE: 73 BPM | WEIGHT: 127.4 LBS | TEMPERATURE: 98.2 F

## 2023-03-07 VITALS — HEART RATE: 71 BPM

## 2023-03-07 LAB — COLONOSCOPY: NORMAL

## 2023-03-07 PROCEDURE — G0105 COLORECTAL SCRN; HI RISK IND: HCPCS

## 2023-03-07 RX ORDER — SODIUM CHLORIDE, SODIUM LACTATE, POTASSIUM CHLORIDE, CALCIUM CHLORIDE 600; 310; 30; 20 MG/100ML; MG/100ML; MG/100ML; MG/100ML
INJECTION, SOLUTION INTRAVENOUS CONTINUOUS PRN
Status: DISCONTINUED | OUTPATIENT
Start: 2023-03-07 | End: 2023-03-07

## 2023-03-07 RX ORDER — ONDANSETRON 2 MG/ML
4 INJECTION INTRAMUSCULAR; INTRAVENOUS
Status: DISCONTINUED | OUTPATIENT
Start: 2023-03-07 | End: 2023-03-08 | Stop reason: HOSPADM

## 2023-03-07 RX ORDER — PROPOFOL 10 MG/ML
INJECTION, EMULSION INTRAVENOUS CONTINUOUS PRN
Status: DISCONTINUED | OUTPATIENT
Start: 2023-03-07 | End: 2023-03-07

## 2023-03-07 RX ORDER — LIDOCAINE 40 MG/G
CREAM TOPICAL
Status: DISCONTINUED | OUTPATIENT
Start: 2023-03-07 | End: 2023-03-08 | Stop reason: HOSPADM

## 2023-03-07 RX ORDER — LIDOCAINE HYDROCHLORIDE 20 MG/ML
INJECTION, SOLUTION INFILTRATION; PERINEURAL PRN
Status: DISCONTINUED | OUTPATIENT
Start: 2023-03-07 | End: 2023-03-07

## 2023-03-07 RX ADMIN — PROPOFOL 350 MG/KG/HR: 10 INJECTION, EMULSION INTRAVENOUS at 11:42

## 2023-03-07 RX ADMIN — LIDOCAINE HYDROCHLORIDE 60 MG: 20 INJECTION, SOLUTION INFILTRATION; PERINEURAL at 11:43

## 2023-03-07 RX ADMIN — SODIUM CHLORIDE, SODIUM LACTATE, POTASSIUM CHLORIDE, CALCIUM CHLORIDE: 600; 310; 30; 20 INJECTION, SOLUTION INTRAVENOUS at 11:40

## 2023-03-07 NOTE — H&P
Marvin HEART McIndoo  [id number]  male  67 year old      Reason for procedure/surgery: surveillance    Patient Active Problem List   Diagnosis     Anxiety     Persistent depressive disorder     CARDIOVASCULAR SCREENING; LDL GOAL LESS THAN 160     Chronic pelvic pain in male     Osteochondritis     Chronic midline low back pain without sciatica     Encounter for general adult medical examination with abnormal findings     Hyperlipidemia LDL goal <130       Past Surgical History:    Past Surgical History:   Procedure Laterality Date     COLONOSCOPY  2017     EYE SURGERY  11/21     LAPAROSCOPIC APPENDECTOMY  01/21/2012    Procedure:LAPAROSCOPIC APPENDECTOMY; Open Appendectomy; Surgeon:GERDA GASTON; Location:[facility]       Past Medical History:   Past Medical History:   Diagnosis Date     Anxiety      Depressive disorder      Prostate infection        Social History:   Social History     Tobacco Use     Smoking status: Never     Smokeless tobacco: Never   Substance Use Topics     Alcohol use: No       Family History:   Family History   Problem Relation Age of Onset     Respiratory Mother         copd     Psychotic Disorder Mother      Mental Illness Mother      Substance Abuse Mother      C.A.D. Father      Psychotic Disorder Father         depression, anxiety, alcohol abuse     Coronary Artery Disease Father      Hypertension Father      Hyperlipidemia Father      Substance Abuse Father      Substance Abuse Maternal Grandfather      Thyroid Disease Maternal Grandmother      Substance Abuse Paternal Grandfather      Hypertension Brother      Hyperlipidemia Brother      Substance Abuse Brother      Substance Abuse Brother      Prostate Cancer No family hx of      Cancer - colorectal No family hx of      Diabetes No family hx of      Skin Cancer No family hx of        Allergies: No Known Allergies    Active Medications:   Current Outpatient Medications   Medication Sig Dispense Refill     atorvastatin (LIPITOR)  10 MG tablet Take 1 tablet (10 mg) by mouth daily 90 tablet 3     Na Sulfate-K Sulfate-Mg Sulf (SUPREP BOWEL PREP KIT) solution Take 177 mLs (1 Bottle) by mouth 2 times daily 354 mL 0     PARoxetine (PAXIL) 40 MG tablet Take 1 tablet (40 mg) by mouth every morning       propranolol ER (INDERAL LA) 60 MG 24 hr capsule Take 1 capsule (60 mg) by mouth daily 90 capsule 3     VITAMIN D, CHOLECALCIFEROL, PO Take 1,000 Units by mouth 2 times daily Vitamin D, PRN       bisacodyl (DULCOLAX) 5 MG EC tablet Take 2 tablets at 3 pm the day before your procedure. If your procedure is before 11 am, take 2 additional tablets at 11 pm. If your procedure is after 11 am, take 2 additional tablets at 6 am. For additional instructions refer to your colonoscopy prep instructions. 4 tablet 0     clindamycin (CLEOCIN T) 1 % external lotion Apply once daily after showering. 60 mL 11     clindamycin 1 % EX external lotion Apply once daily to arms, chest, and back. 120 mL 11     clindamycin 1 % EX external solution Apply 10-15 drops once daily to the scalp. 60 mL 1     EPINEPHrine (ANY BX GENERIC EQUIV) 0.3 MG/0.3ML injection 2-pack Inject 0.3 mLs (0.3 mg) into the muscle as needed for anaphylaxis (or angioedema (throat closing)) May repeat one time in 5-15 minutes if response to initial dose is inadequate. 2 each 1     fluocinolone (SYNALAR) 0.01 % solution Apply topically daily Apply 10-15 drops before bed. 90 mL 11     fluocinonide (LIDEX) 0.05 % external ointment Apply twice daily as needed for itchy spots. 30 g 11     fluocinonide (LIDEX) 0.05 % external solution Apply 10-15 drops to scalp at nighttime before bed as needed for itch/scale. 60 mL 11     ketoconazole (NIZORAL) 2 % external shampoo Lather onto forehead and scalp at least 2-3 times weekly in the shower. Let sit for 1-2 minutes before rinsing. 120 mL 11     multivitamin w/minerals (THERA-VIT-M) tablet Take 1 tablet by mouth daily       polyethylene glycol (GOLYTELY) 236 g  "suspension The night before the exam at 6 pm drink an 8-ounce glass every 15 minutes until the jug is half empty. If you arrive before 11 AM: Drink the other half of the Golytely jug at 11 PM night before procedure. If you arrive after 11 AM: Drink the other half of the Golytely jug at 6 AM day of procedure. For additional instructions refer to your colonoscopy prep instructions. 4000 mL 0       Systemic Review:   ROS otherwise negative    Physical Examination:   Vital Signs: /82 (BP Location: Right arm)   Pulse 106   Temp 97.9  F (36.6  C) (Temporal)   Resp 18   Ht 1.734 m (5' 8.25\")   Wt 57.8 kg (127 lb 6.4 oz)   SpO2 99%   BMI 19.23 kg/m    GENERAL: healthy, alert and no distress  HENT: oropharynx clear and oral mucous membranes moist, Mallampati III  NECK: Normal ROM  RESP: lungs clear to auscultation - no rales, rhonchi or wheezes  CV: regular rate and rhythm, normal S1 S2,   ABDOMEN: soft, nontender, and bowel sounds normal  MS: no gross musculoskeletal defects noted, no edema    Plan: Appropriate to proceed as scheduled.      Bozena Franco MD  3/7/2023    PCP:  Wegener, Joel Daniel Irwin    "

## 2023-03-07 NOTE — ANESTHESIA CARE TRANSFER NOTE
Patient: Marvin Elaine    Procedure: Procedure(s):  COLONOSCOPY       Diagnosis: Screening for colon cancer [Z12.11]  History of colon polyps [Z86.010]  Diagnosis Additional Information: No value filed.    Anesthesia Type:   No value filed.     Note:    Oropharynx: oropharynx clear of all foreign objects and spontaneously breathing  Level of Consciousness: awake  Oxygen Supplementation: room air    Independent Airway: airway patency satisfactory and stable  Dentition: dentition unchanged  Vital Signs Stable: post-procedure vital signs reviewed and stable  Report to RN Given: handoff report given  Patient transferred to: Phase II  Comments: Report to Phase II RN. Resps easy and regular.   Handoff Report: Identifed the Patient, Identified the Reponsible Provider, Reviewed the pertinent medical history, Discussed the surgical course, Reviewed Intra-OP anesthesia mangement and issues during anesthesia, Set expectations for post-procedure period and Allowed opportunity for questions and acknowledgement of understanding      Vitals:  Vitals Value Taken Time   BP     Temp     Pulse     Resp     SpO2         Electronically Signed By: CONCEPCION MURRIETA CRNA  March 7, 2023  12:25 PM

## 2023-03-12 NOTE — ANESTHESIA PREPROCEDURE EVALUATION
Anesthesia Pre-Procedure Evaluation    Patient: Marvin Elaine   MRN: [id number] : 1955        Procedure : Procedure(s):  COLONOSCOPY          Past Medical History:   Diagnosis Date     Anxiety      Depressive disorder      Prostate infection       Past Surgical History:   Procedure Laterality Date     COLONOSCOPY       COLONOSCOPY N/A 3/7/2023    Procedure: COLONOSCOPY;  Surgeon: Bozena Franco MD;  Location: [facility] OR     EYE SURGERY       LAPAROSCOPIC APPENDECTOMY  2012    Procedure:LAPAROSCOPIC APPENDECTOMY; Open Appendectomy; Surgeon:GERDA GASTON; Location:[facility]      No Known Allergies   Social History     Tobacco Use     Smoking status: Never     Smokeless tobacco: Never   Substance Use Topics     Alcohol use: No      Wt Readings from Last 1 Encounters:   23 57.8 kg (127 lb 6.4 oz)        Anesthesia Evaluation   Pt has had prior anesthetic.     No history of anesthetic complications       ROS/MED HX  ENT/Pulmonary:  - neg pulmonary ROS     Neurologic:  - neg neurologic ROS     Cardiovascular:     (+) Dyslipidemia -----    METS/Exercise Tolerance: >4 METS    Hematologic:       Musculoskeletal:       GI/Hepatic:    (-) GERD and liver disease   Renal/Genitourinary:    (-) renal disease   Endo:       Psychiatric/Substance Use:     (+) psychiatric history anxiety and depression     Infectious Disease:       Malignancy:       Other:            Physical Exam    Airway  airway exam normal           Respiratory Devices and Support         Dental       (+) Minor Abnormalities - some fillings, tiny chips      Cardiovascular   cardiovascular exam normal          Pulmonary   pulmonary exam normal                OUTSIDE LABS:  CBC:   Lab Results   Component Value Date    WBC 7.0 2019    WBC 8.4 10/13/2017    HGB 14.4 2019    HGB 12.2 (L) 10/13/2017    HCT 43.0 2019    HCT 39.7 (L) 10/13/2017     2019     10/13/2017     BMP:    Lab Results   Component Value Date     12/30/2022     12/21/2018    POTASSIUM 4.7 12/30/2022    POTASSIUM 4.2 12/21/2018    CHLORIDE 103 12/30/2022    CHLORIDE 103 12/21/2018    CO2 26 12/30/2022    CO2 28 12/21/2018    BUN 23.9 (H) 12/30/2022    BUN 21 12/21/2018    CR 1.05 12/30/2022    CR 1.15 12/21/2018    GLC 98 12/30/2022    GLC 81 12/21/2018     COAGS: No results found for: PTT, INR, FIBR  POC: No results found for: BGM, HCG, HCGS  HEPATIC:   Lab Results   Component Value Date    ALBUMIN 4.4 12/30/2022    PROTTOTAL 6.8 12/30/2022    ALT 20 12/30/2022    AST 30 12/30/2022    ALKPHOS 125 12/30/2022    BILITOTAL 0.4 12/30/2022     OTHER:   Lab Results   Component Value Date    CHUCK 9.5 12/30/2022    LIPASE 127 10/13/2017    TSH 1.12 10/23/2018       Anesthesia Plan    ASA Status:  1   NPO Status:  NPO Appropriate    Anesthesia Type: MAC.     - Reason for MAC: straight local not clinically adequate   Induction: Intravenous.   Maintenance: TIVA.        Consents    Anesthesia Plan(s) and associated risks, benefits, and realistic alternatives discussed. Questions answered and patient/representative(s) expressed understanding.    - Discussed:     - Discussed with:  Patient         Postoperative Care       PONV prophylaxis: Ondansetron (or other 5HT-3)     Comments:                Ganga Ruano MD

## 2023-03-12 NOTE — ANESTHESIA POSTPROCEDURE EVALUATION
Patient: Marvin Elaine    Procedure: Procedure(s):  COLONOSCOPY       Anesthesia Type:  MAC    Note:  Disposition: Outpatient   Postop Pain Control: Uneventful            Sign Out: Well controlled pain   PONV: No   Neuro/Psych: Uneventful            Sign Out: Acceptable/Baseline neuro status   Airway/Respiratory: Uneventful            Sign Out: Acceptable/Baseline resp. status   CV/Hemodynamics: Uneventful            Sign Out: Acceptable CV status; No obvious hypovolemia; No obvious fluid overload   Other NRE: NONE   DID A NON-ROUTINE EVENT OCCUR? No           Last vitals:  Vitals Value Taken Time   /70 03/07/23 1227   Temp 36.8  C (98.2  F) 03/07/23 1227   Pulse 73 03/07/23 1227   Resp 16 03/07/23 1227   SpO2         Electronically Signed By: Ganga Ruano MD  March 12, 2023  12:23 PM

## 2023-03-21 ENCOUNTER — MYC MEDICAL ADVICE (OUTPATIENT)
Dept: FAMILY MEDICINE | Facility: CLINIC | Age: 68
End: 2023-03-21
Payer: COMMERCIAL

## 2023-03-21 DIAGNOSIS — I10 HYPERTENSION GOAL BP (BLOOD PRESSURE) < 130/80: ICD-10-CM

## 2023-03-21 DIAGNOSIS — F41.9 ANXIETY: ICD-10-CM

## 2023-03-22 NOTE — TELEPHONE ENCOUNTER
JW,      Please see [employer] message.  Last OV 12/30/22      Thank you,  Edwige Del Rosario RN

## 2023-03-26 RX ORDER — PROPRANOLOL HYDROCHLORIDE 10 MG/1
10 TABLET ORAL 3 TIMES DAILY
Qty: 90 TABLET | Refills: 11 | Status: SHIPPED | OUTPATIENT
Start: 2023-03-26 | End: 2023-06-13

## 2023-04-13 ENCOUNTER — VIRTUAL VISIT (OUTPATIENT)
Dept: FAMILY MEDICINE | Facility: CLINIC | Age: 68
End: 2023-04-13
Payer: COMMERCIAL

## 2023-04-13 DIAGNOSIS — R13.10 DYSPHAGIA, UNSPECIFIED TYPE: Primary | ICD-10-CM

## 2023-04-13 PROCEDURE — 99207 PR NO CHARGE LOS: CPT | Mod: VID | Performed by: FAMILY MEDICINE

## 2023-04-27 ENCOUNTER — OFFICE VISIT (OUTPATIENT)
Dept: DERMATOLOGY | Facility: CLINIC | Age: 68
End: 2023-04-27
Payer: COMMERCIAL

## 2023-04-27 ENCOUNTER — TELEPHONE (OUTPATIENT)
Dept: DERMATOLOGY | Facility: CLINIC | Age: 68
End: 2023-04-27

## 2023-04-27 DIAGNOSIS — L20.9 ATOPIC DERMATITIS, UNSPECIFIED TYPE: ICD-10-CM

## 2023-04-27 DIAGNOSIS — L81.4 SOLAR LENTIGO: ICD-10-CM

## 2023-04-27 DIAGNOSIS — D18.01 CHERRY ANGIOMA: ICD-10-CM

## 2023-04-27 DIAGNOSIS — L57.0 ACTINIC KERATOSIS: ICD-10-CM

## 2023-04-27 DIAGNOSIS — L28.1 PRURIGO NODULARIS: Primary | ICD-10-CM

## 2023-04-27 DIAGNOSIS — L82.0 SEBORRHEIC KERATOSIS, INFLAMED: ICD-10-CM

## 2023-04-27 DIAGNOSIS — L82.1 SEBORRHEIC KERATOSIS: ICD-10-CM

## 2023-04-27 DIAGNOSIS — D22.9 MULTIPLE BENIGN NEVI: ICD-10-CM

## 2023-04-27 DIAGNOSIS — L20.9 ATOPIC DERMATITIS, UNSPECIFIED TYPE: Primary | ICD-10-CM

## 2023-04-27 DIAGNOSIS — L28.1 PRURIGO NODULARIS: ICD-10-CM

## 2023-04-27 PROCEDURE — 99214 OFFICE O/P EST MOD 30 MIN: CPT | Mod: 25 | Performed by: DERMATOLOGY

## 2023-04-27 PROCEDURE — 17110 DESTRUCTION B9 LES UP TO 14: CPT | Performed by: DERMATOLOGY

## 2023-04-27 PROCEDURE — 17000 DESTRUCT PREMALG LESION: CPT | Mod: XS | Performed by: DERMATOLOGY

## 2023-04-27 PROCEDURE — 17003 DESTRUCT PREMALG LES 2-14: CPT | Mod: XS | Performed by: DERMATOLOGY

## 2023-04-27 RX ORDER — TACROLIMUS 1 MG/G
OINTMENT TOPICAL
Qty: 60 G | Refills: 11 | Status: SHIPPED | OUTPATIENT
Start: 2023-04-27 | End: 2023-04-28

## 2023-04-27 ASSESSMENT — PAIN SCALES - GENERAL: PAINLEVEL: NO PAIN (0)

## 2023-04-27 NOTE — TELEPHONE ENCOUNTER
PRIOR AUTHORIZATION DENIED    Medication: tacrolimus (PROTOPIC) 0.1 % external ointment - EPA DENIED    Denial Date: 4/27/2023    Denial Rational:       Appeal Information:

## 2023-04-27 NOTE — NURSING NOTE
Dermatology Rooming Note    Marvin Elaine's goals for this visit include:   Chief Complaint   Patient presents with     Skin Check     Patient reports no new spots of concern. Patient would like a full body skin check. Patient reports folliculitis has not improved and would like to discuss treatment plan.     Migdalia San LPN

## 2023-04-27 NOTE — LETTER
4/27/2023       RE: Marvin Elaine  [address]  [facility] [address]     Dear Colleague,    Thank you for referring your patient, Marvin Elaine, to the [facility] DERMATOLOGY [facility] [place] at [facility]. Please see a copy of my visit note below.    [facility] Dermatology Note  Encounter Date: Apr 27, 2023  Office Visit     Dermatology Problem List:  1. Inflamed SK  -s/p cryotherapy   2. Xerosis cutis  -current t/x: cream based moisturizer  3. AKs. LiqN2.   4.  Acne/folliculitis with acne excorie/prurigo nodularis/atopic dermatitis.   - current tx: BP 4-5% wash, clindamycin 1% lotion, clindamycin 1% solution, tacrolimus 0.1%   - previous tx:doxycycline 100 mg PO BID/qdaily x 6 months (improvement),  bleach baths, hydrocortisone 0.2% cream  5. Seborrheic dermatitis.   - ketoconazole 2% shampoo three times per week  - Synalar solution at bedtime   ____________________________________________    Assessment & Plan:    # Actinic keratoses, left forehead x1, right forehead x1, right cheek x1  - Cryotherapy performed today (see procedure note(s) below).     # Inflamed Seborrheic keratosis, left thigh x1  - Cryotherapy today, see procedure below     # Seborrheic dermatitis, forehead and scalp. Chronic, stable.  - Continue ketoconazole 2% shampoo TIW. Okay to apply to face.  - Continue Synalar at night for scalp itching. Discussed using 10-15 drops at night and massage into scalp.      # Benign lesions: Multiple benign nevi, solar lentigos, seborrheic keratoses, cherry angiomas. Explained to patient benign nature of lesion. No treatment is necessary at this time unless the lesion changes or becomes symptomatic.   - ABCDs of melanoma were discussed and self skin checks were advised.  - Sun precaution was advised including the use of sun screens of SPF 30 or higher, sun protective clothing, and avoidance of tanning beds.     #  Acne/folliculitis with acne excorie. Chronic, flare.   # Atopic dermatitis/prurigo nodularis. Chronic, flare.   - Start tacrolimus 0.1% twice daily as needed.   - Lidex 0.05% ointment BID PRN flares  - Can continue BP wash and clindamycin 1% lotion  - Future considerations: Dupixent     Procedures Performed:   - Cryotherapy procedure note, location(s): see above. After verbal consent and discussion of risks and benefits including, but not limited to, dyspigmentation/scar, blister, and pain, 4 lesion(s) was(were) treated with 1-2 mm freeze border for 1-2 cycles with liquid nitrogen. Post cryotherapy instructions were provided.    Follow-up: 2-3 month(s) in-person, or earlier for new or changing lesions    Staff and Scribe:     Scribe Disclosure:  ISade, am serving as a scribe to document services personally performed by Harpal Dalal MD based on data collection and the provider's statements to me.     Provider Disclosure:   The documentation recorded by the scribe accurately reflects the services I personally performed and the decisions made by me.    Harpal Dalal MD    Department of Dermatology  [facility]: Phone: [phone], Fax:[phone]  [facility]: Phone: [phone] Fax: [phone]  ____________________________________________    CC: Skin Check (Patient reports no new spots of concern. Patient would like a full body skin check. Patient reports folliculitis has not improved and would like to discuss treatment plan.)    HPI:  Mr. Marvin Elaine is a(n) 67 year old male who presents today as a return patient for FBSE. Last seen in dermatology by me on 4/14/22, at which time patient was started on synalar for treatment of seborrheic dermatitis. Additionally, patient underwent cryotherapy for treatment of AKs.    Today, patient reports he is still getting new  acne spots. He reports that they itch and burn. He uses topical benadryl on the areas. He does not get seasonal allergies and he did not have eczema as a child. He has no spots of concern for his skin exam today. He does not spend a lot of time outdoors.     Patient is otherwise feeling well, without additional skin concerns.    Labs Reviewed:  N/A    Physical Exam:  Vitals: There were no vitals taken for this visit.  SKIN: Total skin excluding the undergarment areas was performed. The exam included the head/face, neck, both arms, chest, back, abdomen, both legs, digits and/or nails.   - Few scattered excoriated scaly pink papules on upper extremities, upper back, and bilateral ankles.  - There are dome shaped bright red papules on the trunk and extremities.   - Multiple regular brown pigmented macules and papules are identified on the trunk and extremities.   - Scattered brown macules on sun exposed areas.  - There are waxy stuck on tan to brown papules on the trunk and extremities.  - There are erythematous macules with overyling adherent scale on the left forehead, right forehead, right cheek.   - There is a tan to brown waxy stuck on papule with surrounding erythema on the left thigh.   - No other lesions of concern on areas examined.     Medications:  Current Outpatient Medications   Medication    tacrolimus (PROTOPIC) 0.1 % external ointment    atorvastatin (LIPITOR) 10 MG tablet    bisacodyl (DULCOLAX) 5 MG EC tablet    clindamycin (CLEOCIN T) 1 % external lotion    clindamycin 1 % EX external lotion    clindamycin 1 % EX external solution    EPINEPHrine (ANY BX GENERIC EQUIV) 0.3 MG/0.3ML injection 2-pack    fluocinolone (SYNALAR) 0.01 % solution    fluocinonide (LIDEX) 0.05 % external ointment    fluocinonide (LIDEX) 0.05 % external solution    ketoconazole (NIZORAL) 2 % external shampoo    multivitamin w/minerals (THERA-VIT-M) tablet    Na Sulfate-K Sulfate-Mg Sulf (SUPREP BOWEL PREP KIT) solution     PARoxetine (PAXIL) 40 MG tablet    polyethylene glycol (GOLYTELY) 236 g suspension    propranolol (INDERAL) 10 MG tablet    VITAMIN D, CHOLECALCIFEROL, PO     No current facility-administered medications for this visit.      Past Medical History:   Patient Active Problem List   Diagnosis    Anxiety    Persistent depressive disorder    CARDIOVASCULAR SCREENING; LDL GOAL LESS THAN 160    Chronic pelvic pain in male    Osteochondritis    Chronic midline low back pain without sciatica    Encounter for general adult medical examination with abnormal findings    Hyperlipidemia LDL goal <130     Past Medical History:   Diagnosis Date    Anxiety     Depressive disorder     Prostate infection

## 2023-04-27 NOTE — PROGRESS NOTES
[facility] Dermatology Note  Encounter Date: Apr 27, 2023  Office Visit     Dermatology Problem List:  1. Inflamed SK  -s/p cryotherapy   2. Xerosis cutis  -current t/x: cream based moisturizer  3. AKs. LiqN2.   4.  Acne/folliculitis with acne excorie/prurigo nodularis/atopic dermatitis.   - current tx: BP 4-5% wash, clindamycin 1% lotion, clindamycin 1% solution, tacrolimus 0.1%   - previous tx:doxycycline 100 mg PO BID/qdaily x 6 months (improvement),  bleach baths, hydrocortisone 0.2% cream  5. Seborrheic dermatitis.   - ketoconazole 2% shampoo three times per week  - Synalar solution at bedtime   ____________________________________________    Assessment & Plan:    # Actinic keratoses, left forehead x1, right forehead x1, right cheek x1  - Cryotherapy performed today (see procedure note(s) below).     # Inflamed Seborrheic keratosis, left thigh x1  - Cryotherapy today, see procedure below     # Seborrheic dermatitis, forehead and scalp. Chronic, stable.  - Continue ketoconazole 2% shampoo TIW. Okay to apply to face.  - Continue Synalar at night for scalp itching. Discussed using 10-15 drops at night and massage into scalp.      # Benign lesions: Multiple benign nevi, solar lentigos, seborrheic keratoses, cherry angiomas. Explained to patient benign nature of lesion. No treatment is necessary at this time unless the lesion changes or becomes symptomatic.   - ABCDs of melanoma were discussed and self skin checks were advised.  - Sun precaution was advised including the use of sun screens of SPF 30 or higher, sun protective clothing, and avoidance of tanning beds.     # Acne/folliculitis with acne excorie. Chronic, flare.   # Atopic dermatitis/prurigo nodularis. Chronic, flare.   - Start tacrolimus 0.1% twice daily as needed.   - Lidex 0.05% ointment BID PRN flares  - Can continue BP wash and clindamycin 1% lotion  - Future considerations: Dupixent     Procedures Performed:   - Cryotherapy  procedure note, location(s): see above. After verbal consent and discussion of risks and benefits including, but not limited to, dyspigmentation/scar, blister, and pain, 4 lesion(s) was(were) treated with 1-2 mm freeze border for 1-2 cycles with liquid nitrogen. Post cryotherapy instructions were provided.    Follow-up: 2-3 month(s) in-person, or earlier for new or changing lesions    Staff and Scribe:     Scribe Disclosure:  I, Sade Cardona, am serving as a scribe to document services personally performed by Harpal Dalal MD based on data collection and the provider's statements to me.     Provider Disclosure:   The documentation recorded by the scribe accurately reflects the services I personally performed and the decisions made by me.    Harpal Dalal MD    Department of Dermatology  [facility]: Phone: [phone], Fax:[phone]  [facility]: Phone: [phone] Fax: [phone]  ____________________________________________    CC: Skin Check (Patient reports no new spots of concern. Patient would like a full body skin check. Patient reports folliculitis has not improved and would like to discuss treatment plan.)    HPI:  Mr. Marvin Elaine is a(n) 67 year old male who presents today as a return patient for FBSE. Last seen in dermatology by me on 4/14/22, at which time patient was started on synalar for treatment of seborrheic dermatitis. Additionally, patient underwent cryotherapy for treatment of AKs.    Today, patient reports he is still getting new acne spots. He reports that they itch and burn. He uses topical benadryl on the areas. He does not get seasonal allergies and he did not have eczema as a child. He has no spots of concern for his skin exam today. He does not spend a lot of time outdoors.     Patient is otherwise feeling well, without additional skin  concerns.    Labs Reviewed:  N/A    Physical Exam:  Vitals: There were no vitals taken for this visit.  SKIN: Total skin excluding the undergarment areas was performed. The exam included the head/face, neck, both arms, chest, back, abdomen, both legs, digits and/or nails.   - Few scattered excoriated scaly pink papules on upper extremities, upper back, and bilateral ankles.  - There are dome shaped bright red papules on the trunk and extremities.   - Multiple regular brown pigmented macules and papules are identified on the trunk and extremities.   - Scattered brown macules on sun exposed areas.  - There are waxy stuck on tan to brown papules on the trunk and extremities.  - There are erythematous macules with overyling adherent scale on the left forehead, right forehead, right cheek.   - There is a tan to brown waxy stuck on papule with surrounding erythema on the left thigh.   - No other lesions of concern on areas examined.     Medications:  Current Outpatient Medications   Medication     tacrolimus (PROTOPIC) 0.1 % external ointment     atorvastatin (LIPITOR) 10 MG tablet     bisacodyl (DULCOLAX) 5 MG EC tablet     clindamycin (CLEOCIN T) 1 % external lotion     clindamycin 1 % EX external lotion     clindamycin 1 % EX external solution     EPINEPHrine (ANY BX GENERIC EQUIV) 0.3 MG/0.3ML injection 2-pack     fluocinolone (SYNALAR) 0.01 % solution     fluocinonide (LIDEX) 0.05 % external ointment     fluocinonide (LIDEX) 0.05 % external solution     ketoconazole (NIZORAL) 2 % external shampoo     multivitamin w/minerals (THERA-VIT-M) tablet     Na Sulfate-K Sulfate-Mg Sulf (SUPREP BOWEL PREP KIT) solution     PARoxetine (PAXIL) 40 MG tablet     polyethylene glycol (GOLYTELY) 236 g suspension     propranolol (INDERAL) 10 MG tablet     VITAMIN D, CHOLECALCIFEROL, PO     No current facility-administered medications for this visit.      Past Medical History:   Patient Active Problem List   Diagnosis     Anxiety      Persistent depressive disorder     CARDIOVASCULAR SCREENING; LDL GOAL LESS THAN 160     Chronic pelvic pain in male     Osteochondritis     Chronic midline low back pain without sciatica     Encounter for general adult medical examination with abnormal findings     Hyperlipidemia LDL goal <130     Past Medical History:   Diagnosis Date     Anxiety      Depressive disorder      Prostate infection

## 2023-04-27 NOTE — PATIENT INSTRUCTIONS
Patient Education     Checking for Skin Cancer  You can find cancer early by checking your skin each month. There are 3 kinds of skin cancer. They are melanoma, basal cell carcinoma, and squamous cell carcinoma. Doing monthly skin checks is the best way to find new marks or skin changes. Follow the instructions below for checking your skin.   The ABCDEs of checking moles for melanoma   Check your moles or growths for signs of melanoma using ABCDE:   Asymmetry: the sides of the mole or growth don t match  Border: the edges are ragged, notched, or blurred  Color: the color within the mole or growth varies  Diameter: the mole or growth is larger than 6 mm (size of a pencil eraser)  Evolving: the size, shape, or color of the mole or growth is changing (evolving is not shown in the images below)    Checking for other types of skin cancer  Basal cell carcinoma or squamous cell carcinoma have symptoms such as:     A spot or mole that looks different from all other marks on your skin  Changes in how an area feels, such as itching, tenderness, or pain  Changes in the skin's surface, such as oozing, bleeding, or scaliness  A sore that does not heal  New swelling or redness beyond the border of a mole    Who s at risk?  Anyone can get skin cancer. But you are at greater risk if you have:   Fair skin, light-colored hair, or light-colored eyes  Many moles or abnormal moles on your skin  A history of sunburns from sunlight or tanning beds  A family history of skin cancer  A history of exposure to radiation or chemicals  A weakened immune system  If you have had skin cancer in the past, you are at risk for recurring skin cancer.   How to check your skin  Do your monthly skin checkups in front of a full-length mirror. Check all parts of your body, including your:   Head (ears, face, neck, and scalp)  Torso (front, back, and sides)  Arms (tops, undersides, upper, and lower armpits)  Hands (palms, backs, and fingers, including  under the nails)  Buttocks and genitals  Legs (front, back, and sides)  Feet (tops, soles, toes, including under the nails, and between toes)  If you have a lot of moles, take digital photos of them each month. Make sure to take photos both up close and from a distance. These can help you see if any moles change over time.   Most skin changes are not cancer. But if you see any changes in your skin, call your doctor right away. Only he or she can diagnose a problem. If you have skin cancer, seeing your doctor can be the first step toward getting the treatment that could save your life.   light last reviewed this educational content on 4/1/2019 2000-2020 [employer] [address] [place] [address], [place] [address]. All rights reserved. This information is not intended as a substitute for professional medical care. Always follow your healthcare professional's instructions.       When should I call my doctor?  If you are worsening or not improving, please, contact us or seek urgent care as noted below.     Who should I call with questions (adults)?  [facility] (adult and pediatric): [phone]  [facility] (adult): [phone]  For urgent needs outside of business hours call the [facility] at [phone] and ask for the dermatology resident on call to be paged  If this is a medical emergency and you are unable to reach an ER, Call [phone]    Who should I call with questions (pediatric)?  [facility]- Pediatric Dermatology  Dr. Joan Walsh, Dr. Scotty Aldana, Dr. Deidra France, PRINCESS Santos, Dr. Brandie Wiggins, Dr. Elisabeth Boyce & Dr. Steve Berger  Non-urgent nurse triage line; [phone]- Melly and Jelena ZAMAN Care Coordinatorlouisa Wagner (/Complex ) [phone]    If you need a prescription refill, please contact your pharmacy. Refills are approved or denied by our  Physicians during normal business hours, Monday through Fridays  Per office policy, refills will not be granted if you have not been seen within the past year (or sooner depending on your child's condition)    Scheduling Information:  Pediatric Appointment Scheduling and Call Center [phone]  Radiology Scheduling- [phone]  Sedation Unit Scheduling- [phone]  [place] Scheduling- General [phone]; Pediatric Dermatology [phone]  Main  Services: [phone]  Telugu: [phone]  Belizean: [phone]  Hmong/Tongan/Turkmen: [phone]  Preadmission Nursing Department Fax Number: [phone] (Fax all pre-operative paperwork to this number)    For urgent matters arising during evenings, weekends, or holidays that cannot wait for normal business hours please call [phone] and ask for the dermatology resident on call to be paged. Cryotherapy    What is it?  Use of a very cold liquid, such as liquid nitrogen, to freeze and destroy abnormal skin cells that need to be removed    What should I expect?  Tenderness and redness  A small blister that might grow and fill with dark purple blood. There may be crusting.  More than one treatment may be needed if the lesions do not go away.    How do I care for the treated area?  Gently wash the area with your hands when bathing.  Use a thin layer of Vaseline to help with healing. You may use a Band-Aid.   The area should heal within 7-10 days and may leave behind a pink or lighter color.   Do not use an antibiotic or Neosporin ointment.   You may take acetaminophen (Tylenol) for pain.     Call your doctor if you have:  Severe pain  Signs of infection (warmth, redness, cloudy yellow drainage, and or a bad smell)  Questions or concerns    Who should I call with questions?      [facility]: [phone]      [facility]: [phone]      For urgent needs outside of business hours  call the [facility] at [phone] and ask for the dermatology resident on call

## 2023-04-28 RX ORDER — TACROLIMUS 1 MG/G
OINTMENT TOPICAL
Qty: 60 G | Refills: 11 | Status: SHIPPED | OUTPATIENT
Start: 2023-04-28 | End: 2023-05-12

## 2023-05-03 ENCOUNTER — MYC MEDICAL ADVICE (OUTPATIENT)
Dept: FAMILY MEDICINE | Facility: CLINIC | Age: 68
End: 2023-05-03
Payer: COMMERCIAL

## 2023-05-12 ENCOUNTER — OFFICE VISIT (OUTPATIENT)
Dept: FAMILY MEDICINE | Facility: CLINIC | Age: 68
End: 2023-05-12
Payer: COMMERCIAL

## 2023-05-12 VITALS
SYSTOLIC BLOOD PRESSURE: 123 MMHG | HEART RATE: 76 BPM | HEIGHT: 68 IN | TEMPERATURE: 98.6 F | RESPIRATION RATE: 10 BRPM | OXYGEN SATURATION: 97 % | DIASTOLIC BLOOD PRESSURE: 75 MMHG | WEIGHT: 124.7 LBS | BODY MASS INDEX: 18.9 KG/M2

## 2023-05-12 DIAGNOSIS — I10 HYPERTENSION GOAL BP (BLOOD PRESSURE) < 130/80: ICD-10-CM

## 2023-05-12 DIAGNOSIS — L50.9 URTICARIA, UNSPECIFIED: ICD-10-CM

## 2023-05-12 DIAGNOSIS — Z23 HIGH PRIORITY FOR 2019-NCOV VACCINE: ICD-10-CM

## 2023-05-12 DIAGNOSIS — R22.1 THROAT SWELLING: Primary | ICD-10-CM

## 2023-05-12 DIAGNOSIS — R13.10 DYSPHAGIA, UNSPECIFIED TYPE: ICD-10-CM

## 2023-05-12 DIAGNOSIS — R53.83 OTHER FATIGUE: ICD-10-CM

## 2023-05-12 LAB — TSH SERPL DL<=0.005 MIU/L-ACNC: 1.05 UIU/ML (ref 0.3–4.2)

## 2023-05-12 PROCEDURE — 84443 ASSAY THYROID STIM HORMONE: CPT | Performed by: FAMILY MEDICINE

## 2023-05-12 PROCEDURE — 86003 ALLG SPEC IGE CRUDE XTRC EA: CPT | Performed by: FAMILY MEDICINE

## 2023-05-12 PROCEDURE — 0124A COVID-19 BIVALENT 12+ (PFIZER): CPT | Performed by: FAMILY MEDICINE

## 2023-05-12 PROCEDURE — 36415 COLL VENOUS BLD VENIPUNCTURE: CPT | Performed by: FAMILY MEDICINE

## 2023-05-12 PROCEDURE — 91312 COVID-19 BIVALENT 12+ (PFIZER): CPT | Performed by: FAMILY MEDICINE

## 2023-05-12 PROCEDURE — 99213 OFFICE O/P EST LOW 20 MIN: CPT | Mod: 25 | Performed by: FAMILY MEDICINE

## 2023-05-12 ASSESSMENT — ANXIETY QUESTIONNAIRES
5. BEING SO RESTLESS THAT IT IS HARD TO SIT STILL: NOT AT ALL
GAD7 TOTAL SCORE: 0
7. FEELING AFRAID AS IF SOMETHING AWFUL MIGHT HAPPEN: NOT AT ALL
2. NOT BEING ABLE TO STOP OR CONTROL WORRYING: NOT AT ALL
GAD7 TOTAL SCORE: 0
1. FEELING NERVOUS, ANXIOUS, OR ON EDGE: NOT AT ALL
IF YOU CHECKED OFF ANY PROBLEMS ON THIS QUESTIONNAIRE, HOW DIFFICULT HAVE THESE PROBLEMS MADE IT FOR YOU TO DO YOUR WORK, TAKE CARE OF THINGS AT HOME, OR GET ALONG WITH OTHER PEOPLE: NOT DIFFICULT AT ALL
7. FEELING AFRAID AS IF SOMETHING AWFUL MIGHT HAPPEN: NOT AT ALL
8. IF YOU CHECKED OFF ANY PROBLEMS, HOW DIFFICULT HAVE THESE MADE IT FOR YOU TO DO YOUR WORK, TAKE CARE OF THINGS AT HOME, OR GET ALONG WITH OTHER PEOPLE?: NOT DIFFICULT AT ALL
GAD7 TOTAL SCORE: 0
3. WORRYING TOO MUCH ABOUT DIFFERENT THINGS: NOT AT ALL
6. BECOMING EASILY ANNOYED OR IRRITABLE: NOT AT ALL
4. TROUBLE RELAXING: NOT AT ALL

## 2023-05-12 ASSESSMENT — PAIN SCALES - GENERAL: PAINLEVEL: NO PAIN (0)

## 2023-05-12 ASSESSMENT — PATIENT HEALTH QUESTIONNAIRE - PHQ9
10. IF YOU CHECKED OFF ANY PROBLEMS, HOW DIFFICULT HAVE THESE PROBLEMS MADE IT FOR YOU TO DO YOUR WORK, TAKE CARE OF THINGS AT HOME, OR GET ALONG WITH OTHER PEOPLE: NOT DIFFICULT AT ALL
SUM OF ALL RESPONSES TO PHQ QUESTIONS 1-9: 2
SUM OF ALL RESPONSES TO PHQ QUESTIONS 1-9: 2

## 2023-05-12 NOTE — PATIENT INSTRUCTIONS
Throat swelling and dysphagia:    Discussed possibilities.  Considering most episodes fairly transient (less than one minute) unlikely to be from actual swelling/angioedema or eosinophilic esophagitis or stricture.  Will hold off on GI consult/endoscopy for now.     May be due to esophageal spasm however.  Discussed strategies to treat this including starting to eat slowly and if severe mint/peppermint.     Check blood food allergy testing today.       4. Other fatigue/sleepiness  Discussed possible contributors including propranolol, paxil.     Check tsh for hypothyroidism.     Although does not snore if truly feeling sleepy despite adequate sleep I would consider sleep apnea testing either via sleep consult or e-consult for home testing.  Will consider/think about.   - TSH with free T4 reflex; Future    5. Hypertension goal BP (blood pressure) < 130/80  At goal/controlled.     6. High priority for 2019-nCoV vaccine  Today.

## 2023-05-12 NOTE — PROGRESS NOTES
1. Throat swelling and dysphagia:    Discussed possibilities.  Considering most episodes fairly transient (less than one minute) unlikely to be from actual swelling/angioedema or eosinophilic esophagitis or stricture.  Will hold off on GI consult/endoscopy for now.     May be due to esophageal spasm however.  Discussed strategies to treat this including starting to eat slowly and if severe mint/peppermint.     Check blood food allergy testing today.       4. Other fatigue/sleepiness  Discussed possible contributors including propranolol, paxil.     Check tsh for hypothyroidism.     Although does not snore if truly feeling sleepy despite adequate sleep I would consider sleep apnea testing either via sleep consult or e-consult for home testing.  Will consider/think about.   - TSH with free T4 reflex; Future    5. Hypertension goal BP (blood pressure) < 130/80  At goal/controlled.     6. High priority for 2019-nCoV vaccine  Today.     25 minutes spent on the date of the encounter doing chart review, history and exam, negotiating and explaining the plan with the patient, documentation and further activities as noted above.               Geraldo Romero is a 67 year old, presenting for the following health issues:  Consult and Imm/Inj (COVID-19 VACCINE)        5/12/2023     9:33 AM   Additional Questions   Roomed by Tarun HAWTHORNE     History of Present Illness       Reason for visit:  Consult    He eats 0-1 servings of fruits and vegetables daily.He consumes 0 sweetened beverage(s) daily.He exercises with enough effort to increase his heart rate 30 to 60 minutes per day.  He exercises with enough effort to increase his heart rate 4 days per week.   He is taking medications regularly.    Today's PHQ-9         PHQ-9 Total Score: 2    PHQ-9 Q9 Thoughts of better off dead/self-harm past 2 weeks :   Not at all    How difficult have these problems made it for you to do your work, take care of things at home, or get along with  "other people: Not difficult at all  Today's EMBER-7 Score: 0     Has had 3-4 episodes of what is described as dysphagia since we last met . Only had the one episode with mouth swelling.     Questioning need for GI consult/upper endoscopy.             Review of Systems         Objective    /75   Pulse 76   Temp 98.6  F (37  C) (Temporal)   Resp 10   Ht 1.734 m (5' 8.25\")   Wt 56.6 kg (124 lb 11.2 oz)   SpO2 97%   BMI 18.82 kg/m    Body mass index is 18.82 kg/m .  Physical Exam   Clear speech, appears well.                     "

## 2023-05-20 LAB
ALMOND IGE QN: <0.1 KU(A)/L
CASHEW NUT IGE QN: <0.1 KU(A)/L
CODFISH IGE QN: <0.1 KU(A)/L
COW MILK IGE QN: <0.1 KU(A)/L
EGG WHITE IGE QN: <0.1 KU(A)/L
HAZELNUT IGE QN: <0.1 KU(A)/L
IGE SERPL-ACNC: 41 KU/L (ref 0–114)
PEANUT IGE QN: <0.1 KU(A)/L
SALMON IGE QN: <0.1 KU(A)/L
SCALLOP IGE QN: <0.1 KU(A)/L
SESAME SEED IGE QN: <0.1 KU(A)/L
SHRIMP IGE QN: <0.1 KU(A)/L
SOYBEAN IGE QN: <0.1 KU(A)/L
TUNA IGE QN: <0.1 KU(A)/L
WALNUT IGE QN: <0.1 KU(A)/L
WHEAT IGE QN: <0.1 KU(A)/L

## 2023-06-12 ENCOUNTER — MYC MEDICAL ADVICE (OUTPATIENT)
Dept: DERMATOLOGY | Facility: CLINIC | Age: 68
End: 2023-06-12
Payer: COMMERCIAL

## 2023-06-12 DIAGNOSIS — L21.9 DERMATITIS, SEBORRHEIC: ICD-10-CM

## 2023-06-13 RX ORDER — KETOCONAZOLE 20 MG/ML
SHAMPOO TOPICAL
Qty: 120 ML | Refills: 9 | Status: SHIPPED | OUTPATIENT
Start: 2023-06-13

## 2023-06-13 NOTE — TELEPHONE ENCOUNTER
ketoconazole (NIZORAL) 2 % external shampoo  Last Written Prescription Date:  4/14/22  Last Fill Quantity: 120ml,   # refills: 11  Last Office Visit : 4/27/23  Future Office visit: 7/19/23    Process 1

## 2023-06-13 NOTE — TELEPHONE ENCOUNTER
Received a [employer] message from the patient requesting refills of their Ketoconazole prescription. The patient requested that the medication be sent to [facility] in [place] if approved.

## 2023-06-20 ENCOUNTER — ALLIED HEALTH/NURSE VISIT (OUTPATIENT)
Dept: RESEARCH | Facility: CLINIC | Age: 68
End: 2023-06-20
Payer: COMMERCIAL

## 2023-06-20 VITALS
RESPIRATION RATE: 12 BRPM | WEIGHT: 122.9 LBS | TEMPERATURE: 98.1 F | OXYGEN SATURATION: 99 % | HEART RATE: 62 BPM | DIASTOLIC BLOOD PRESSURE: 65 MMHG | BODY MASS INDEX: 18.63 KG/M2 | SYSTOLIC BLOOD PRESSURE: 121 MMHG | HEIGHT: 68 IN

## 2023-06-20 DIAGNOSIS — Z00.6 EXAMINATION OF PARTICIPANT OR CONTROL IN CLINICAL RESEARCH: Primary | ICD-10-CM

## 2023-06-20 PROCEDURE — 99207 PR NO CHARGE NURSE ONLY: CPT

## 2023-06-20 NOTE — PROGRESS NOTES
"             Garcia Study Note    Study Description: This is a prospective intervention-based study that will involve data collection from individuals with measured or perceived mild-to-moderate hearing loss. This study is deemed to meet the qualification of a research study with non-significant risk.           Subject ID: [id number]      SCREENING     Demographic Info  Marvin Elaine   1955          67 year old  male    Race: White  Ethnicity: Non-/       Allergies:  No Known Allergies     Medical History:  Has the subject experienced any past and/or concomitant Medical History in the following categories: No  -Ear/Nose/Throat  -Dementia or other neurological condition preventing following instructions'  -Vascular conditions:     If yes, record that medical history in [facility], other conditions are NOT entered into [employer] and deleted from below    Past Medical History:   None related to the study    Medications  Has the subject taken either of these medications in the last 6 months?   Parenteral Aminoglycoside Antibiotics: No   (ex. gentamicin, amikacin, tobramycin, neomycin, streptomycin and others)  Chemotherapy and/or radiation to Head and/or Neck: No    Current Outpatient Medications   Medication   None related to the study          Vitals:  Time Subject Sat: 11:10 AM   /65 (BP Location: Left arm, Patient Position: Sitting, Cuff Size: Adult Regular)   Pulse 62   Temp 98.1  F (36.7  C)   Resp 12   Ht 1.73 m (5' 8.11\")   Wt 55.7 kg (122 lb 14.4 oz)   SpO2 99%   BMI 18.63 kg/m         Perceived Hearing Loss:  1. Do you have trouble hearing speech in noisy places? Yes  2. Do you find it hard to follow speech in groups? Yes  3. Do you have trouble hearing on the phone? Yes  4. Do you need to turn up the volume on the TV or radio, and other people complain it is too loud? No    ELIGIBILITY CRITERIA     Protocol Version: 2.0 (24-May-2023)  Consent Version: 3.0 (8-Jun-2023)  Criteria #  " Inclusion Criteria (ALL MUST BE YES)  YES/NO/N/A   1  Age > 18 years Yes   2  Proficient in written and spoken English, defined by self report Yes   3  Mild- to Moderate- hearing loss as measured by pure tone audiometry (PTA) reference test, or self-report of perceived hearing loss and 15-19 dB hearing loss (by 4PTA) NA   4  Participants have access to stable internet connection  Yes           Criteria # Exclusion Criteria (ALL MUST BE NO) YES/NO/N/A   5  Ear anatomy non-conducive to comfortable wear of headphone  NA   6  Active ear disease  No   7  Cerumen impaction that cannot be removed  No   8  Sudden loss of hearing (in the preceding 90 days), defined by self-report No   9  Self-report of loud environmental sound exposure (e.g., concert, construction site, fireworks) without hearing protection, within 72 hours of reference PTA assessed at Clinic Visit 1 NA   10  Tinnitus that impacts one's daily life, defined by self-report No   11  Use of cochlear implants No   12  Self-reported issues with small or confined spaces such as a single-person enclosed romero, and/or claustrophobia No   13  Health technology, fitness, media outlet employees (or spouse of employees) or employees of /sites contracted to execute this study  No   14  User noted preference to not wear headphone consistently, or charge headphone and smartphone consistently, during field-use  No   15  Hearing loss >60 dB HL at 0.25-3kHz and >65 dB HL at 4kHz in either ear; assessed during PTA NA   16  Hearing loss that requires electroacoustic settings which are not acoustically stable in the participant's ear per audiologist judgement NA   17  Current regular use of hearing aids No   18  Active treatment, or treatment in the past 6 months, with either a chemotherapeutic drug for cancer, or radiation therapy to the head or neck region No   19  Active treatment, or treatment in the past 6 months, with parenteral aminoglycoside antibiotics  No   20   In  the Investigator's opinion, unable to adhere to study procedures No     Patient does fulfill study inclusion criteria and no exclusion criteria are found at this time. IE criteria assessment will be completed at Clinic Visit 1.    Subject Disposition Summary  Is there a change in Subject Disposition? No    20-JUN-2023  Bismark Henry

## 2023-06-20 NOTE — PROGRESS NOTES
Garcia Study Consent    Study Description: This is a prospective intervention-based study that will involve data collection from individuals with measured or perceived mild-to-moderate hearing loss. This study is deemed to meet the qualification of a research study with non-significant risk.          Marvin Elaine a 67 year old male, was on-site  today to discuss participation in the Garcia Study.       The consent form was reviewed with the patient.     The review of the study included:    Study Purpose      Participant Responsibilities      Study Data and Devices      Benefits and Risks of Participation      Compensation and Costs of Participation      Voluntary Participation      Confidentiality      Injury and Legal Rights      Protocol Version: 2.0     Subject ID: [id number]    The subject was queried in regards to his willingness to continue and his questions were answered to his satisfaction.     The patient has given his agreement to volunteer and participate in the above noted study.     The Consent  and HIPAA form version 3.0 (8-Jun-2023) was signed on  20-JUN-2023 with the Clinical Research Unit of [facility].     A copy of the Garcia consent will be placed in subject's medical record. A copy of the consent form was given to the subject today.    Study data is directly entered into [employer] and [employer] per protocol.     No study procedures were done prior to Marvin Elaine providing informed consent.       20-JUN-2023    Bismark Henry

## 2023-06-20 NOTE — PROGRESS NOTES
"       Garcia Study Physical Exam      Medical History Reviewed? Yes  (Parenteral aminoglycoside antibiotics: gentamicin, amikacin, tobramycin, neomycin, and streptomycin, ect )    Does Nick use hearing aids daily? No    Physical Examination  For abnormal findings, please evaluate if the finding is Clinically Significant (by 'CS') or Not Clinically Significant (by 'NCS')  General Appearance   Normal  Head and Neck   Abnormal; NCS wears glasses; is wearing bilateral hearing aids  Lungs     Normal  Cardiovascular   Normal  Abdomen    Normal  Lymph Nodes   Normal  Skin     Normal  Neurological    Normal      Vitals:    06/20/23 1138   BP: 121/65   BP Location: Left arm   Patient Position: Sitting   Cuff Size: Adult Regular   Pulse: 62   Resp: 12   Temp: 98.1  F (36.7  C)   SpO2: 99%   Weight: 55.7 kg (122 lb 14.4 oz)   Height: 1.73 m (5' 8.11\")              Otoscopy    Left Ear Right Ear   Ear Canal  Normal Normal   Abnormal Findings Description  N/A N/A   Finding of Other Description N/A N/A       20-JUN-2023    Jade Carreon PA-C      "

## 2023-06-26 ENCOUNTER — ALLIED HEALTH/NURSE VISIT (OUTPATIENT)
Dept: RESEARCH | Facility: CLINIC | Age: 68
End: 2023-06-26

## 2023-06-26 ENCOUNTER — OFFICE VISIT (OUTPATIENT)
Dept: AUDIOLOGY | Facility: CLINIC | Age: 68
End: 2023-06-26
Payer: COMMERCIAL

## 2023-06-26 DIAGNOSIS — Z00.6 RESEARCH STUDY PATIENT: Primary | ICD-10-CM

## 2023-06-26 DIAGNOSIS — Z00.6 EXAMINATION OF PARTICIPANT OR CONTROL IN CLINICAL RESEARCH: Primary | ICD-10-CM

## 2023-06-26 PROCEDURE — 92567 TYMPANOMETRY: CPT | Performed by: AUDIOLOGIST

## 2023-06-26 PROCEDURE — 99207 PR NO CHARGE NURSE ONLY: CPT

## 2023-06-26 PROCEDURE — V5011 HEARING AID FITTING/CHECKING: HCPCS | Performed by: AUDIOLOGIST

## 2023-06-26 PROCEDURE — 92557 COMPREHENSIVE HEARING TEST: CPT | Performed by: AUDIOLOGIST

## 2023-06-26 NOTE — PROGRESS NOTES
Jose Inclusion/Exclusion Criteria:    Study Name: Garcia   : Francisco Montgomery MD      Study Description: This is a prospective intervention-based study that will involve data collection from individuals with measured or perceived mild-to-moderate hearing loss. This study is deemed to meet the qualification of a research study with non-significant risk.     Protocol Version: 2.0 (24-May-2023)  Consent Version: 3.0 (8-Jun-2023)    Criteria #  Inclusion Criteria (ALL MUST BE YES)  YES/NO/N/A   1  Age > 18 years  Yes   2  Proficient in written and spoken English, defined by self report Yes   3  Mild- to Moderate- hearing loss as measured by pure tone audiometry (PTA) reference test, or self-report of perceived hearing loss and 15-25 dB hearing loss (by 4PTA) Yes   4  Participants have access to stable internet connection    Yes             Criteria # Exclusion Criteria (ALL MUST BE NO) YES/NO/N/A   5  Ear anatomy non-conducive to comfortable wear of headphone  No   6  Active ear disease    No   7  Cerumen impaction that cannot be removed    No   8  Sudden loss of hearing (in the preceding 90 days), defined by self-report No   9 Self-report of loud environmental sound exposure (e.g., concert, construction site, fireworks) without hearing protection, within 72 hours of reference PTA assessed at Clinic Visit 1 No   10    Tinnitus that impacts one's daily life, defined by self-report No   11  Use of cochlear implants   No   12  Self-reported issues with small or confined spaces such as single-person enclosed romero, and/or claustrophobia No   13  Health technology, fitness, media outlet employees (or spouse of employees) or employees of /sites contracted to execute this study  No   14  User noted preference to not wear headphone consistently, or charge headphone and smartphone consistently, during field-use  No   15  Hearing loss >60 dB HL at 0.25-3kHz and >65 dB HL at 4kHz in either ear; assessed  during PTA No   16  Hearing loss that requires electroacoustic settings which are not acoustically stable in the participant's ear per audiologist judgement No   17    Current regular use of hearing aids No   18    Active treatment, or treatment in the past 6 months, with either a chemotherapeutic drug for cancer, or radiation therapy to the head or neck region No   19    Active treatment, or treatment in the past 6 months, with parenteral aminoglycoside antibiotics  No   20    In the Investigator's opinion, unable to adhere to study procedures No     Patient does fulfill study inclusion criteria and no exclusion criteria are found at this time. IE criteria assessment will be completed at Clinic Visit 1.  Following CV1, patient is qualified.     Francisco Montgomery MD    26-JUN-2023    Teddy Mitchell

## 2023-06-26 NOTE — PROGRESS NOTES
Garcia Study Clinic Visit 1 Note    Study Description: This is a prospective intervention-based study that will involve data collection from individuals with measured or perceived mild-to-moderate hearing loss. This study is deemed to meet the qualification of a research study with non-significant risk.           Marvin Elaine a 67 year old male, was seen at the [facility] today to discuss participation in the Garcia study.     Pure Tone Reference, Tympanometry and Bone Conduction were performed during this visit.     Subject Status: Enrolled and Randomized    Study data was captured on the audiogram and then entered into EDC later by a Research Coordinator.     No study procedures were done prior to Marvin Elaine providing informed consent.     All subject questions were answered and they voiced understanding.     Alba Alexis, AuD  06/26/23

## 2023-06-26 NOTE — PROGRESS NOTES
Garcia Study Clinic Visit 1 Note    Study Description: This is a prospective intervention-based study that will involve data collection from individuals with measured or perceived mild-to-moderate hearing loss. This study is deemed to meet the qualification of a research study with non-significant risk.         Marvin Elaine a 67 year old male, was seen at the [facility] today to discuss participation in the Garcia study and complete Clinic Visit 1.    Subject ID: [id number]    Study data including Pure Tone Reference, Tympanometry, Bone Conduction was captured on the audiogram and paper source and then entered into EDC later by Teddy Mitchell.     Randomization:   GROUP Confirmed? [x]   Treatment Arm: Self-Fit     Device Accountability Dispense  Was Device Kit Dispensed?  Yes  Date Device Kit was dispensed? 26-JUN-2023  Device Kit ID: [id number]   Phone ID: [id number]   Charging Case ID: [id number]   Case Serial Number: [id number]   Recommended Headphone Tip Size: M  Headphone Final Tip Size: L; changed to large following fit test.     Patient was extensively educated for their treatment arm. All questions were answered and subject voiced understanding.     Issues: NA    Is there a change in Subject Disposition?  No   Were any Adverse Events (AEs) experienced?  No  Were there any Protocol Deviations? No    26-JUN-2023    Teddy Mitchell

## 2023-07-03 ENCOUNTER — ALLIED HEALTH/NURSE VISIT (OUTPATIENT)
Dept: RESEARCH | Facility: CLINIC | Age: 68
End: 2023-07-03

## 2023-07-03 ENCOUNTER — OFFICE VISIT (OUTPATIENT)
Dept: AUDIOLOGY | Facility: CLINIC | Age: 68
End: 2023-07-03
Payer: COMMERCIAL

## 2023-07-03 DIAGNOSIS — Z00.6 RESEARCH STUDY PATIENT: Primary | ICD-10-CM

## 2023-07-03 DIAGNOSIS — Z00.6 EXAMINATION OF PARTICIPANT OR CONTROL IN CLINICAL RESEARCH: Primary | ICD-10-CM

## 2023-07-03 PROCEDURE — V5020 CONFORMITY EVALUATION: HCPCS | Performed by: AUDIOLOGIST

## 2023-07-03 PROCEDURE — V5011 HEARING AID FITTING/CHECKING: HCPCS | Performed by: AUDIOLOGIST

## 2023-07-03 PROCEDURE — 99207 PR NO CHARGE NURSE ONLY: CPT

## 2023-07-03 PROCEDURE — 92556 SPEECH AUDIOMETRY COMPLETE: CPT | Performed by: AUDIOLOGIST

## 2023-07-03 NOTE — PROGRESS NOTES
Garcia Study Clinic Visit 2 Note    Purpose: This is a prospective intervention-based study that will involve data collection from individuals with measured or perceived mild-to-moderate hearing loss. This study is deemed to meet the qualification of a research study with non-significant risk.        Marvin Elaine a 67 year old male , was seen at the Audiology clinic today to complete clinical visit 2 for the Garcia study.     Real Ear Measures and Speech in Noise were performed during this visit.     Study data was directly entered onto paper source and then entered into EDC later by a Research Coordinator.     All questions were answered and subject voiced understanding    Alba Alexis, Palmer  07/03/23

## 2023-07-03 NOTE — PROGRESS NOTES
Garcia Study Clinic Visit 2 Note    Purpose: This is a prospective intervention-based study that will involve data collection from individuals with measured or perceived mild-to-moderate hearing loss. This study is deemed to meet the qualification of a research study with non-significant risk.        Subject ID: [id number]  Treatment Arm: Self-Fit    Marvin Elaine a 67 year old male , was seen at the Audiology clinic today to complete clinical visit 2 for the Garcia study.     Study data including Real Ear Measures and Speech in Noise was directly entered onto paper source and then entered into EDC later by Teddy Mitchell.     Were the device serial numbers confirmed? Yes    Was there a change in Subject Disposition?  No  Were there any Adverse Events (AEs) experienced? No  Were there any Protocol Deviations? No    All subject questions were answered and they voiced their understanding.     03-JUL-2023    Teddy Mitchell

## 2023-07-05 ENCOUNTER — E-VISIT (OUTPATIENT)
Dept: FAMILY MEDICINE | Facility: CLINIC | Age: 68
End: 2023-07-05
Payer: COMMERCIAL

## 2023-07-05 DIAGNOSIS — F41.9 ANXIETY: ICD-10-CM

## 2023-07-05 DIAGNOSIS — I10 HYPERTENSION GOAL BP (BLOOD PRESSURE) < 130/80: ICD-10-CM

## 2023-07-05 PROCEDURE — 99421 OL DIG E/M SVC 5-10 MIN: CPT | Performed by: FAMILY MEDICINE

## 2023-07-05 RX ORDER — PROPRANOLOL HYDROCHLORIDE 10 MG/1
10 TABLET ORAL 2 TIMES DAILY
Qty: 90 TABLET | Refills: 6 | COMMUNITY
Start: 2023-07-05 | End: 2024-02-27

## 2023-07-05 RX ORDER — PROPRANOLOL HYDROCHLORIDE 10 MG/1
10 TABLET ORAL 3 TIMES DAILY
Qty: 90 TABLET | Refills: 6 | Status: CANCELLED | OUTPATIENT
Start: 2023-07-05

## 2023-07-11 ENCOUNTER — TELEPHONE (OUTPATIENT)
Dept: RESEARCH | Facility: CLINIC | Age: 68
End: 2023-07-11
Payer: COMMERCIAL

## 2023-07-11 DIAGNOSIS — Z00.6 RESEARCH SUBJECT: Primary | ICD-10-CM

## 2023-07-11 PROCEDURE — 99207 PR NO CHARGE NURSE ONLY: CPT

## 2023-07-11 NOTE — TELEPHONE ENCOUNTER
Garcia Compliance Call    Study Description: This is a prospective intervention-based study that will involve data collection from individuals with measured or perceived mild-to-moderate hearing loss. This study is deemed to meet the qualification of a research study with non-significant risk.        Subject ID: [id number]      Reason for Call: Compliance Report     Issues with the subject's data that were identified by study Sponsor were relayed to the subject. Corrections and troubleshooting strategies for these, including daily wear, were reviewed and the subject voiced understanding.      Subject verbally confirmed they are connected to their home WIFI, completing the data upload procedure, and appropriately charging devices.     Additional Comments: Participant called d/t useability. Participant stated not wearing devices yesterday, 7/10/23. He thought you needed 3 days total for HI2. Writer reeducated to wear devices daily and participant very agreeable. He even bought his own pair of headphones because he is hearing so much better. Also told participant to keep personal headphones away from study devices. All other study procedures followed.       11-JUL-2023    Roro Horta RN

## 2023-07-21 ENCOUNTER — ALLIED HEALTH/NURSE VISIT (OUTPATIENT)
Dept: RESEARCH | Facility: CLINIC | Age: 68
End: 2023-07-21

## 2023-07-21 ENCOUNTER — OFFICE VISIT (OUTPATIENT)
Dept: AUDIOLOGY | Facility: CLINIC | Age: 68
End: 2023-07-21
Payer: COMMERCIAL

## 2023-07-21 DIAGNOSIS — Z00.6 EXAMINATION OF PARTICIPANT OR CONTROL IN CLINICAL RESEARCH: Primary | ICD-10-CM

## 2023-07-21 DIAGNOSIS — Z00.6 RESEARCH STUDY PATIENT: Primary | ICD-10-CM

## 2023-07-21 PROCEDURE — 99207 PR NO CHARGE NURSE ONLY: CPT

## 2023-07-21 PROCEDURE — 92556 SPEECH AUDIOMETRY COMPLETE: CPT | Performed by: AUDIOLOGIST

## 2023-07-21 PROCEDURE — V5020 CONFORMITY EVALUATION: HCPCS | Performed by: AUDIOLOGIST

## 2023-07-21 NOTE — PROGRESS NOTES
Garcia Study Clinic Visit 3 Note    Study Description: This is a prospective intervention-based study that will involve data collection from individuals with measured or perceived mild-to-moderate hearing loss. This study is deemed to meet the qualification of a research study with non-significant risk.       Marvin Elaine a 67 year old male, was seen at the Audiology clinic today to complete Clinic Visit 3 for the Garcia study.     Real Ear Measures and Speech in Noise were performed during this visit.     Study data was directly entered onto paper source and then entered into the EDC later by a Research Coordinator.     All questions were answered and subject voiced understanding.     Alba [facility], AuD  07/21/23

## 2023-07-21 NOTE — PROGRESS NOTES
Garcia Study Clinic Visit 3 Note    Study Description: This is a prospective intervention-based study that will involve data collection from individuals with measured or perceived mild-to-moderate hearing loss. This study is deemed to meet the qualification of a research study with non-significant risk.       Subject ID: [id number]  Treatment Arm: Self-Fit    Marvin Elaine a 67 year old male, was seen at the Audiology clinic today to complete Clinic Visit 3 for the Garcia study.     Study data including Real Ear Measures and Speech in Noise was directly captured on paper source and then entered into the EDC later by Bismark Henry.     Subject completed IOI-HA in [employer] per protocol.       Device Accountability Return  Was the Device Kit Returned? Yes  Returned Device Kit ID: [id number]  Date Device Kit Returned: 21-JUL-2023    Were there Device Issues? No       Did the subject complete the study? Yes  Date of Study Completion: 21-JUL-2023    Were any Adverse Events (AEs) experienced? No  Any Protocol Deviations? No    This visit thus completes their participation in the study.     21-JUL-2023    Bismark Henry

## 2023-08-10 DIAGNOSIS — E78.5 HYPERLIPIDEMIA LDL GOAL <130: ICD-10-CM

## 2023-08-11 RX ORDER — ATORVASTATIN CALCIUM 10 MG/1
10 TABLET, FILM COATED ORAL DAILY
Qty: 1 TABLET | Refills: 1 | OUTPATIENT
Start: 2023-08-11

## 2023-08-28 NOTE — TELEPHONE ENCOUNTER
FUTURE VISIT INFORMATION      FUTURE VISIT INFORMATION:  Date: 8/31/23  Time: 11am  Location: [facility]  REFERRAL INFORMATION:  Referring provider:    Referring providers clinic:    Reason for visit/diagnosis  Pt feels like throat is closing up, Pt made appt for [facility] location     RECORDS REQUESTED FROM:       No records

## 2023-08-30 NOTE — PROGRESS NOTES
[facility]   at the [facility]   Otolaryngology Clinic     Patient: Marvin Elaine    MRN: [id number]    : 1955    Age/Gender: 67 year old male  Date of Service: 2023  Rendering Provider:   Ladonna Mcqueen MD     Referring Provider   PCP: Wegener, Joel Daniel Irwin  Referring Physician: Referred Self, MD  No address on file  Reason for Consultation   Throat tightness  History   HISTORY OF PRESENT ILLNESS: Mr. Elaine is a 67 year old male who presents to us today with with throat tightness.         he presents today for evaluation. he reports:   History obtained with SLP  From SLP notes    Trouble with swallowing   Happened one day - 1 year  Maybe ate too big of a bite  Sometimes with potato chips, stress, intermittent, can happen sometimes in the car  Swelling feeling, sometimes things getting stuck  Has happened a couple of times - 1-2 times per month  Throat felt like it swelled  Pin-point pain with one of them a couple weeks ago  Stuck in the bottom 1/2 of the neck  Lasts for about 1 minutes     Dysphonia: denies changes to his voice - he and his brother have always had a quiet voice     Dyspnea: denies breathing issues or stridor during throat episodes and outside    PAST MEDICAL HISTORY:   Past Medical History:   Diagnosis Date    Anxiety     Depressive disorder     Prostate infection        PAST SURGICAL HISTORY:   Past Surgical History:   Procedure Laterality Date    COLONOSCOPY      COLONOSCOPY N/A 3/7/2023    Procedure: COLONOSCOPY;  Surgeon: Bozena Franco MD;  [place] [facility] [place]    EYE SURGERY      LAPAROSCOPIC APPENDECTOMY  2012    Procedure:LAPAROSCOPIC APPENDECTOMY; Open Appendectomy; Surgeon:GERDA GASTON; Location: OR       CURRENT MEDICATIONS:   Current Outpatient Medications:     atorvastatin (LIPITOR) 10 MG tablet, Take 1 tablet (10 mg) by mouth daily, Disp: 90 tablet, Rfl: 1    clindamycin (CLEOCIN T) 1 %  external lotion, Apply once daily after showering., Disp: 60 mL, Rfl: 11    clindamycin 1 % EX external solution, Apply 10-15 drops once daily to the scalp. (Patient not taking: Reported on 5/12/2023), Disp: 60 mL, Rfl: 1    fluocinonide (LIDEX) 0.05 % external ointment, Apply twice daily as needed for itchy spots., Disp: 30 g, Rfl: 11    fluocinonide (LIDEX) 0.05 % external solution, Apply 10-15 drops to scalp at nighttime before bed as needed for itch/scale., Disp: 60 mL, Rfl: 11    ketoconazole (NIZORAL) 2 % external shampoo, Lather onto forehead and scalp at least 2-3 times weekly in the shower. Let sit for 1-2 minutes before rinsing., Disp: 120 mL, Rfl: 9    PARoxetine (PAXIL) 40 MG tablet, Take 1 tablet (40 mg) by mouth every morning, Disp: , Rfl:     propranolol (INDERAL) 10 MG tablet, Take 1 tablet (10 mg) by mouth 2 times daily, Disp: 90 tablet, Rfl: 6    ALLERGIES: Patient has no known allergies.    SOCIAL HISTORY:    Social History     Socioeconomic History    Marital status: Single     Spouse name: Not on file    Number of children: Not on file    Years of education: Not on file    Highest education level: Not on file   Occupational History    Not on file   Tobacco Use    Smoking status: Never    Smokeless tobacco: Never   Vaping Use    Vaping Use: Never used   Substance and Sexual Activity    Alcohol use: No    Drug use: No    Sexual activity: Not Currently     Partners: Female     Birth control/protection: None   Other Topics Concern    Parent/sibling w/ CABG, MI or angioplasty before 65F 55M? No   Social History Narrative    Not on file     Social Determinants of Health     Financial Resource Strain: Not on file   Food Insecurity: Not on file   Transportation Needs: Not on file   Physical Activity: Not on file   Stress: Not on file   Social Connections: Not on file   Intimate Partner Violence: Not on file   Housing Stability: Not on file         FAMILY HISTORY:   Family History   Problem Relation Age  of Onset    Respiratory Mother         copd    Psychotic Disorder Mother     Mental Illness Mother     Substance Abuse Mother     C.A.D. Father     Psychotic Disorder Father         depression, anxiety, alcohol abuse    Coronary Artery Disease Father     Hypertension Father     Hyperlipidemia Father     Substance Abuse Father     Substance Abuse Maternal Grandfather     Thyroid Disease Maternal Grandmother     Substance Abuse Paternal Grandfather     Hypertension Brother     Hyperlipidemia Brother     Substance Abuse Brother     Substance Abuse Brother     Prostate Cancer No family hx of     Cancer - colorectal No family hx of     Diabetes No family hx of     Skin Cancer No family hx of      Non-contributory for problems with anesthesia    REVIEW OF SYSTEMS:   The patient was asked a 14 point review of systems regarding constitutional symptoms, eye symptoms, ears, nose, mouth, throat symptoms, cardiovascular symptoms, respiratory symptoms, gastrointestinal symptoms, genitourinary symptoms, musculoskeletal symptoms, integumentary symptoms, neurological symptoms, psychiatric symptoms, endocrine symptoms, hematologic/lymphatic symptoms, and allergic/ immunologic symptoms.   The pertinent factors have been included in the HPI and below.  Patient Supplied Answers to Review of Systems       No data to display                Physical Examination   The patient underwent a physical examination as described below. The pertinent positive and negative findings are summarized after the description of the examination.  Constitutional: The patient's developmental and nutritional status was assessed. The patient's voice quality was assessed.  Head and Face: The head and face were inspected for deformities. The sinuses were palpated. The salivary glands were palpated. Facial muscle strength was assessed bilaterally.  Eyes: Extraocular movements and primary gaze alignment were assessed.  Ears, Nose, Mouth and Throat: The ears and  nose were examined for deformities. The nasal septum, mucosa, and turbinates were inspected by anterior rhinoscopy. The lips, teeth, and gums were examined for abnormalities. The oral mucosa, tongue, palate, tonsils, lateral and posterior pharynx were inspected for the presence of asymmetry or mucosal lesions.    Neck: The tracheal position was noted, and the neck mass palpated to determine if there were any asymmetries, abnormal neck masses, thyromegally, or thyroid nodules.  Respiratory: The nature of the breathing and chest expansion/symmetry was observed.  Cardiovascular: The patient was examined to determine the presence of any edema or jugular venous distension.  Abdomen: The contour of the abdomen was noted.  Lymphatic: The patient was examined for infraclavicular lymphadenopathy.  Musculoskeletal: The patient was inspected for the presence of skeletal deformities.  Extremities: The extremities were examined for any clubbing or cyanosis.  Skin: The skin was examined for inflammatory or neoplastic conditions.  Neurologic: The patient's orientation, mood, and affect were noted. The cranial nerve  functions were examined.  Other pertinent positive and negative findings on physical examination:      OC/OP: no lesions, uvula midline, soft palate elevates symmetrically   Neck: no lesions, bilateral  TH tenderness to palpation  All other physical examination findings were within normal limits and noncontributory.  Procedures   Flexible laryngoscopy (CPT 68567)      Pre-procedure diagnosis: dysphonia  Post-procedure diagnosis: same as above  Indication for procedure: Mr. Elaine is a 67 year old male with see above  Procedure(s): Fiberoptic Laryngoscopy    Details of Procedure: After informed consent was obtained, the patient was seated in the examination chair.  The areas of the nasopharynx as well as the hypopharynx were anesthetized with topical 4% lidocaine with 0.25% phenylephrine atomizer.  Examination of the  base of tongue was performed first.  Attention was directed to any evidence of masses in the area or evidence of leukoplakia or candidal infection.  Attention was directed to the epiglottis where its size and position was determined and its movement on phonation of the vowel  e .  The piriform sinuses were then inspected for any mass lesions or pooling of secretions.  Attention was then directed to the larynx. The vocal folds were inspected for infection or any areas of leukoplakia, for masses, polypoid degeneration, or hemorrhage.  Having done this, the arytenoids and vocal processes were inspected for erythema or evidence of granuloma formation.  The posterior commissure was then inspected for evidence of inflammatory changes in the mucosa and heaping up of mucosal tissue. The patient was then instructed to say the vowel  e .  Adduction of vocal folds to the midline was observed for any evidence of paresis or paralysis of the larynx or asymmetry in rotation of the larynx to the left or right. The patient was asked to breathe and the degree of abduction was noted bilaterally.  Subglottic view of the larynx was obtained for any additional mass lesions or mucosal changes.  Finally the post cricoid was examined for evidence of pooling of secretions, as well as the pharyngeal wall mucosa.   Anesthesia type: 0.25% phenylephrine    Findings:  Anatomic/physiological deviations: LNC, presbylarynx   Right vocal process: No restriction of mobility   Left vocal process: No restriction of mobility  Glottal gap: Complete glottal closure  Supraglottic structures: Normal  Hypopharynx: Normal     Estimated Blood Loss: minimal  Complications: None  Disposition: Patient tolerated the procedure well          Fiberoptic Endoscopic Evaluation of Swallowing (CPT 13666)  and Interpretation of Swallowing (CPT 26488)    Indications: See above notes for complete history and physical. Patient complains of dysphagia to both solids and liquids  and/or there is suggestion on history and endoscopic exam of the presence of dysphagia causing medical complaints.  Swallowing evaluation is being performed to assess the presence and degree of dysphagia, and to recommend a safe diet.     Pulmonary Status:  No PNA   Current Diet:              regular                                        thin liquids      Consistency Amounts:  Thin Liquid: sip   Puree: sip  Solid: cookies            Positioning: upright in a chair  Oral Peripheral Exam: See physical exam section.  Anatomic Notes: See Videostroboscopy report for assessment of anatomy and laryngeal functioning  Pharyngeal secretions prior to administration of liquid or food: No   Oral Phase Abnormal Findings: No abnormal behavior observed   Pharyngeal Phase Abnormal Findings: no penetration, no aspiration     Recommended Diet:  regular                                        thin liquids             Review of Relevant Clinical Data   I personally reviewed:    Labs:  Lab Results   Component Value Date    TSH 1.05 05/12/2023     Lab Results   Component Value Date     12/30/2022    CO2 26 12/30/2022    BUN 23.9 (H) 12/30/2022     Lab Results   Component Value Date    WBC 7.0 01/16/2019    HGB 14.4 01/16/2019    HCT 43.0 01/16/2019    MCV 95 01/16/2019     01/16/2019     No results found for: PT, PTT, INR  No results found for: JOANNE  No components found for: RHEUMATOIDFACTOR,  RF  No results found for: CRP  No components found for: CKTOT, URICACID  No components found for: C3, C4, DSDNAAB, NDNAABIFA  No results found for: MPOAB    Patient reported Quality of Life (QOL) Measures   Patient Supplied Answers To VHI Questionnaire       No data to display                  Patient Supplied Answers To EAT Questionnaire       No data to display                  Patient Supplied Answers To CSI Questionnaire       No data to display                  Patient Supplied Answers to Dyspnea Index Questionnaire:       No data  to display                Impression & Plan     IMPRESSION: Mr. Elaine is a 67 year old male who is being seen for the following:    Globus sensation   - has been going on for a 1 year  Trouble swallowing  Tight feeling, swelling bottom half of his neck  Doesn't feel like food gets stuck  1-2x/month  It goes away in 1-2 minutes  Called EMT once - no issues  - sometimes he gets this when he's in his recliner, sometimes he has this with swallowing chips  - scope shows presbylarynx  - FEES shows safe swallow  - symptoms likely due to laryngopharyngeal reflux and muscle tension dysphonia given b/l TH space tenderness  Plan  - reflux precautions  - consider voice therapy     RETURN VISIT: as needed    Thank you for the kind referral and for allowing me to share in the care of Mr. Elaine. If you have any questions, please do not hesitate to contact me.    Ladonna Mcqueen MD    Laryngology    [facility]  Department of  Otolaryngology - Head and Neck Surgery  Clinics & Surgery Center  [address] [place] [address]  Appointment line: [phone]  Fax: [phone]  [email or web address]

## 2023-08-31 ENCOUNTER — OFFICE VISIT (OUTPATIENT)
Dept: OTOLARYNGOLOGY | Facility: CLINIC | Age: 68
End: 2023-08-31
Payer: COMMERCIAL

## 2023-08-31 ENCOUNTER — PRE VISIT (OUTPATIENT)
Dept: OTOLARYNGOLOGY | Facility: CLINIC | Age: 68
End: 2023-08-31

## 2023-08-31 ENCOUNTER — THERAPY VISIT (OUTPATIENT)
Dept: SPEECH THERAPY | Facility: CLINIC | Age: 68
End: 2023-08-31
Payer: COMMERCIAL

## 2023-08-31 VITALS
HEIGHT: 68 IN | HEART RATE: 69 BPM | WEIGHT: 120 LBS | BODY MASS INDEX: 18.19 KG/M2 | SYSTOLIC BLOOD PRESSURE: 101 MMHG | DIASTOLIC BLOOD PRESSURE: 65 MMHG

## 2023-08-31 DIAGNOSIS — R09.A2 GLOBUS SENSATION: Primary | ICD-10-CM

## 2023-08-31 DIAGNOSIS — R13.10 DYSPHAGIA, UNSPECIFIED TYPE: Primary | ICD-10-CM

## 2023-08-31 DIAGNOSIS — R49.0 DYSPHONIA: Primary | ICD-10-CM

## 2023-08-31 DIAGNOSIS — R49.0 DYSPHONIA: ICD-10-CM

## 2023-08-31 DIAGNOSIS — J38.7 PRESBYLARYNGES: ICD-10-CM

## 2023-08-31 DIAGNOSIS — J38.7 IRRITABLE LARYNX SYNDROME: ICD-10-CM

## 2023-08-31 PROCEDURE — 92524 BEHAVRAL QUALIT ANALYS VOICE: CPT | Mod: GN | Performed by: SPEECH-LANGUAGE PATHOLOGIST

## 2023-08-31 PROCEDURE — 92610 EVALUATE SWALLOWING FUNCTION: CPT | Mod: GN | Performed by: SPEECH-LANGUAGE PATHOLOGIST

## 2023-08-31 PROCEDURE — 92613 ENDOSCOPY SWALLOW (FEES) I&R: CPT | Performed by: OTOLARYNGOLOGY

## 2023-08-31 PROCEDURE — 92612 ENDOSCOPY SWALLOW (FEES) VID: CPT | Mod: GN | Performed by: OTOLARYNGOLOGY

## 2023-08-31 PROCEDURE — 99204 OFFICE O/P NEW MOD 45 MIN: CPT | Mod: 25 | Performed by: OTOLARYNGOLOGY

## 2023-08-31 ASSESSMENT — PAIN SCALES - GENERAL: PAINLEVEL: NO PAIN (0)

## 2023-08-31 NOTE — LETTER
2023       RE: Marvin Elaine  [address]  [facility] [place] [address]     Dear Colleague,    Thank you for referring your patient, Marvin Elaine, to the [facility] EAR NOSE [facility] [place] at [facility]. Please see a copy of my visit note below.        [facility]   at the [facility]   Otolaryngology Clinic     Patient: Marvin Elaine    MRN: [id number]    : 1955    Age/Gender: 67 year old male  Date of Service: 2023  Rendering Provider:   Ladonna Mcqueen MD     Referring Provider   PCP: Wegener, Joel Daniel Irwin  Referring Physician: Jackie Chase MD  No address on file  Reason for Consultation   Throat tightness  History   HISTORY OF PRESENT ILLNESS: Mr. Elaine is a 67 year old male who presents to us today with with throat tightness.         he presents today for evaluation. he reports:   History obtained with SLP  From SLP notes    Trouble with swallowing   Happened one day - 1 year  Maybe ate too big of a bite  Sometimes with potato chips, stress, intermittent, can happen sometimes in the car  Swelling feeling, sometimes things getting stuck  Has happened a couple of times - 1-2 times per month  Throat felt like it swelled  Pin-point pain with one of them a couple weeks ago  Stuck in the bottom 1/2 of the neck  Lasts for about 1 minutes     Dysphonia: denies changes to his voice - he and his brother have always had a quiet voice     Dyspnea: denies breathing issues or stridor during throat episodes and outside    PAST MEDICAL HISTORY:   Past Medical History:   Diagnosis Date    Anxiety     Depressive disorder     Prostate infection        PAST SURGICAL HISTORY:   Past Surgical History:   Procedure Laterality Date    COLONOSCOPY  2017    COLONOSCOPY N/A 3/7/2023    Procedure: COLONOSCOPY;  Surgeon: Bozena Franco MD;  Location: [facility] OR    EYE SURGERY      LAPAROSCOPIC  APPENDECTOMY  01/21/2012    Procedure:LAPAROSCOPIC APPENDECTOMY; Open Appendectomy; Surgeon:GERDA GASTON; Location:[facility] OR       CURRENT MEDICATIONS:   Current Outpatient Medications:     atorvastatin (LIPITOR) 10 MG tablet, Take 1 tablet (10 mg) by mouth daily, Disp: 90 tablet, Rfl: 1    clindamycin (CLEOCIN T) 1 % external lotion, Apply once daily after showering., Disp: 60 mL, Rfl: 11    clindamycin 1 % EX external solution, Apply 10-15 drops once daily to the scalp. (Patient not taking: Reported on 5/12/2023), Disp: 60 mL, Rfl: 1    fluocinonide (LIDEX) 0.05 % external ointment, Apply twice daily as needed for itchy spots., Disp: 30 g, Rfl: 11    fluocinonide (LIDEX) 0.05 % external solution, Apply 10-15 drops to scalp at nighttime before bed as needed for itch/scale., Disp: 60 mL, Rfl: 11    ketoconazole (NIZORAL) 2 % external shampoo, Lather onto forehead and scalp at least 2-3 times weekly in the shower. Let sit for 1-2 minutes before rinsing., Disp: 120 mL, Rfl: 9    PARoxetine (PAXIL) 40 MG tablet, Take 1 tablet (40 mg) by mouth every morning, Disp: , Rfl:     propranolol (INDERAL) 10 MG tablet, Take 1 tablet (10 mg) by mouth 2 times daily, Disp: 90 tablet, Rfl: 6    ALLERGIES: Patient has no known allergies.    SOCIAL HISTORY:    Social History     Socioeconomic History    Marital status: Single     Spouse name: Not on file    Number of children: Not on file    Years of education: Not on file    Highest education level: Not on file   Occupational History    Not on file   Tobacco Use    Smoking status: Never    Smokeless tobacco: Never   Vaping Use    Vaping Use: Never used   Substance and Sexual Activity    Alcohol use: No    Drug use: No    Sexual activity: Not Currently     Partners: Female     Birth control/protection: None   Other Topics Concern    Parent/sibling w/ CABG, MI or angioplasty before 65F 55M? No   Social History Narrative    Not on file     Social Determinants of Health      Financial Resource Strain: Not on file   Food Insecurity: Not on file   Transportation Needs: Not on file   Physical Activity: Not on file   Stress: Not on file   Social Connections: Not on file   Intimate Partner Violence: Not on file   Housing Stability: Not on file         FAMILY HISTORY:   Family History   Problem Relation Age of Onset    Respiratory Mother         copd    Psychotic Disorder Mother     Mental Illness Mother     Substance Abuse Mother     C.A.D. Father     Psychotic Disorder Father         depression, anxiety, alcohol abuse    Coronary Artery Disease Father     Hypertension Father     Hyperlipidemia Father     Substance Abuse Father     Substance Abuse Maternal Grandfather     Thyroid Disease Maternal Grandmother     Substance Abuse Paternal Grandfather     Hypertension Brother     Hyperlipidemia Brother     Substance Abuse Brother     Substance Abuse Brother     Prostate Cancer No family hx of     Cancer - colorectal No family hx of     Diabetes No family hx of     Skin Cancer No family hx of      Non-contributory for problems with anesthesia    REVIEW OF SYSTEMS:   The patient was asked a 14 point review of systems regarding constitutional symptoms, eye symptoms, ears, nose, mouth, throat symptoms, cardiovascular symptoms, respiratory symptoms, gastrointestinal symptoms, genitourinary symptoms, musculoskeletal symptoms, integumentary symptoms, neurological symptoms, psychiatric symptoms, endocrine symptoms, hematologic/lymphatic symptoms, and allergic/ immunologic symptoms.   The pertinent factors have been included in the HPI and below.  Patient Supplied Answers to Review of Systems       No data to display                Physical Examination   The patient underwent a physical examination as described below. The pertinent positive and negative findings are summarized after the description of the examination.  Constitutional: The patient's developmental and nutritional status was assessed.  The patient's voice quality was assessed.  Head and Face: The head and face were inspected for deformities. The sinuses were palpated. The salivary glands were palpated. Facial muscle strength was assessed bilaterally.  Eyes: Extraocular movements and primary gaze alignment were assessed.  Ears, Nose, Mouth and Throat: The ears and nose were examined for deformities. The nasal septum, mucosa, and turbinates were inspected by anterior rhinoscopy. The lips, teeth, and gums were examined for abnormalities. The oral mucosa, tongue, palate, tonsils, lateral and posterior pharynx were inspected for the presence of asymmetry or mucosal lesions.    Neck: The tracheal position was noted, and the neck mass palpated to determine if there were any asymmetries, abnormal neck masses, thyromegally, or thyroid nodules.  Respiratory: The nature of the breathing and chest expansion/symmetry was observed.  Cardiovascular: The patient was examined to determine the presence of any edema or jugular venous distension.  Abdomen: The contour of the abdomen was noted.  Lymphatic: The patient was examined for infraclavicular lymphadenopathy.  Musculoskeletal: The patient was inspected for the presence of skeletal deformities.  Extremities: The extremities were examined for any clubbing or cyanosis.  Skin: The skin was examined for inflammatory or neoplastic conditions.  Neurologic: The patient's orientation, mood, and affect were noted. The cranial nerve  functions were examined.  Other pertinent positive and negative findings on physical examination:      OC/OP: no lesions, uvula midline, soft palate elevates symmetrically   Neck: no lesions, bilateral  TH tenderness to palpation  All other physical examination findings were within normal limits and noncontributory.  Procedures   Flexible laryngoscopy (CPT 72912)      Pre-procedure diagnosis: dysphonia  Post-procedure diagnosis: same as above  Indication for procedure: Mr. Elaine is a 67  year old male with see above  Procedure(s): Fiberoptic Laryngoscopy    Details of Procedure: After informed consent was obtained, the patient was seated in the examination chair.  The areas of the nasopharynx as well as the hypopharynx were anesthetized with topical 4% lidocaine with 0.25% phenylephrine atomizer.  Examination of the base of tongue was performed first.  Attention was directed to any evidence of masses in the area or evidence of leukoplakia or candidal infection.  Attention was directed to the epiglottis where its size and position was determined and its movement on phonation of the vowel  e .  The piriform sinuses were then inspected for any mass lesions or pooling of secretions.  Attention was then directed to the larynx. The vocal folds were inspected for infection or any areas of leukoplakia, for masses, polypoid degeneration, or hemorrhage.  Having done this, the arytenoids and vocal processes were inspected for erythema or evidence of granuloma formation.  The posterior commissure was then inspected for evidence of inflammatory changes in the mucosa and heaping up of mucosal tissue. The patient was then instructed to say the vowel  e .  Adduction of vocal folds to the midline was observed for any evidence of paresis or paralysis of the larynx or asymmetry in rotation of the larynx to the left or right. The patient was asked to breathe and the degree of abduction was noted bilaterally.  Subglottic view of the larynx was obtained for any additional mass lesions or mucosal changes.  Finally the post cricoid was examined for evidence of pooling of secretions, as well as the pharyngeal wall mucosa.   Anesthesia type: 0.25% phenylephrine    Findings:  Anatomic/physiological deviations: LNC, presbylarynx   Right vocal process: No restriction of mobility   Left vocal process: No restriction of mobility  Glottal gap: Complete glottal closure  Supraglottic structures: Normal  Hypopharynx: Normal      Estimated Blood Loss: minimal  Complications: None  Disposition: Patient tolerated the procedure well          Fiberoptic Endoscopic Evaluation of Swallowing (CPT 17891)  and Interpretation of Swallowing (CPT 65041)    Indications: See above notes for complete history and physical. Patient complains of dysphagia to both solids and liquids and/or there is suggestion on history and endoscopic exam of the presence of dysphagia causing medical complaints.  Swallowing evaluation is being performed to assess the presence and degree of dysphagia, and to recommend a safe diet.     Pulmonary Status:  No PNA   Current Diet:              regular                                        thin liquids      Consistency Amounts:  Thin Liquid: sip   Puree: sip  Solid: cookies            Positioning: upright in a chair  Oral Peripheral Exam: See physical exam section.  Anatomic Notes: See Videostroboscopy report for assessment of anatomy and laryngeal functioning  Pharyngeal secretions prior to administration of liquid or food: No   Oral Phase Abnormal Findings: No abnormal behavior observed   Pharyngeal Phase Abnormal Findings: no penetration, no aspiration     Recommended Diet:  regular                                        thin liquids             Review of Relevant Clinical Data   I personally reviewed:    Labs:  Lab Results   Component Value Date    TSH 1.05 05/12/2023     Lab Results   Component Value Date     12/30/2022    CO2 26 12/30/2022    BUN 23.9 (H) 12/30/2022     Lab Results   Component Value Date    WBC 7.0 01/16/2019    HGB 14.4 01/16/2019    HCT 43.0 01/16/2019    MCV 95 01/16/2019     01/16/2019     No results found for: PT, PTT, INR  No results found for: JOANNE  No components found for: RHEUMATOIDFACTOR,  RF  No results found for: CRP  No components found for: CKTOT, URICACID  No components found for: C3, C4, DSDNAAB, NDNAABIFA  No results found for: MPOAB    Patient reported Quality of Life (QOL)  Measures   Patient Supplied Answers To VHI Questionnaire       No data to display                  Patient Supplied Answers To EAT Questionnaire       No data to display                  Patient Supplied Answers To CSI Questionnaire       No data to display                  Patient Supplied Answers to Dyspnea Index Questionnaire:       No data to display                Impression & Plan     IMPRESSION: Mr. Elaine is a 67 year old male who is being seen for the following:    Globus sensation   - has been going on for a 1 year  Trouble swallowing  Tight feeling, swelling bottom half of his neck  Doesn't feel like food gets stuck  1-2x/month  It goes away in 1-2 minutes  Called EMT once - no issues  - sometimes he gets this when he's in his recliner, sometimes he has this with swallowing chips  - scope shows presbylarynx  - FEES shows safe swallow  - symptoms likely due to laryngopharyngeal reflux and muscle tension dysphonia given b/l TH space tenderness  Plan  - reflux precautions  - consider voice therapy     RETURN VISIT: as needed    Thank you for the kind referral and for allowing me to share in the care of Mr. Elaine. If you have any questions, please do not hesitate to contact me.    Ladonna Mcqueen MD    Laryngology    [facility]  Department of  Otolaryngology - Head and Neck Surgery  Clinics & Surgery Center  [address] [place] [address]  Appointment line: [phone]  Fax: [phone]  [email or web address]

## 2023-08-31 NOTE — PROGRESS NOTES
"[facility]  Clinical Voice and Upper Airway Evaluation Report    Patient's Name: Marvin Elaine  Date of Evaluation: 8/31/2023  Providing SLP: Maria Alejandra Masters MS CCC-SLP  Seen in Conjunction With: Ladonna Mcqueen MD - Cheyenne Mcclure, CCC-SLP  Insurance Coverage: [facility] Medicare  Chief Complaint: Throat closing  Evaluation Location: [facility]  Time of Evaluation: 11:26 AM - 12:19 PM      Patient History:     Nick is a 67-year-old male who presents today for evaluation of throat tightness and globus sensation beginning about 1 year ago. This occurred when he was eating one day and possibly took too large of a bite. He endorses a swelling feeling in the inferior 1/2 of the anterior throat, sometimes as though something is feeling stuck. These instances occur 1-2 times per month, infrequently triggered by eating foods like potato chips, stress, or during various activities like driving, and last for about 1 minute before spontaneously recovering. He denies dyspnea or coughing/throat clearing during these instances and has no history of GERD or frequent symptoms. With regard to his voice, he has always had a quiet voice and denies any changes to this.      Quality of Life Questionnaires:    Patient Supplied Answers To Last 2 VHI Questionnaires      8/31/2023    10:35 AM   Voice Handicap Index (VHI-10)   My voice makes it difficult for people to hear me 1   People have difficulty understanding me in a noisy room 2   My voice difficulties restrict my personal and social life.  1   I feel left out of conversations because of my voice 0   My voice problem causes me to lose income 0   I feel as though I have to strain to produce voice 1   The clarity of my voice is unpredictable 1   My voice problem upsets me 0   My voice makes me feel handicapped 0   People ask, \"What's wrong with your voice?\" 0   VHI-10 6     Patient Supplied Answers To Last 2 CSI Questionnaires      8/31/2023    " "10:37 AM   Cough Severity Index (CSI)   My cough is worse when I lie down 0   My coughing problem causes me to restrict my personal and social life 0   I tend to avoid places because of my cough problem 0   I feel embarrassed because of my coughing problem 0   People ask, ''What's wrong?'' because I cough a lot 0   I run out of air when I cough 0   My coughing problem affects my voice 0   My coughing problem limits my physical activity 0   My coughing problem upsets me 0   People ask me if I am sick because I cough a lot 0   CSI Score 0     Patient Supplied Answers To Last 2 EAT Questionnaires      8/31/2023    10:36 AM   Eating Assessment Tool (EAT-10)   My swallowing problem has caused me to lose weight 0   My swallowing problem interferes with my ability to go out for meals 0   Swallowing liquids takes extra effort 0   Swallowing solids takes extra effort 0   Swallowing pills takes extra effort 0   Swallowing is painful 0   The pleasure of eating is affected by my swallowing 0   When I swallow food sticks in my throat 0   I cough when I eat 0   Swallowing is stressful 0   EAT-10 0       Perceptual Analysis (22618): Evaluation of Voice / Speech / Non-Communicative Laryngeal Behaviors    The GRBAS is a perceptual rating of voice change. 0 indicated no impairment, 3 indicates a severe impairment. \"C\" and \"I\" may be used to signify if these feature was observed consistently or intermittently respectively. This is a rating based on clinical judgement of disordered voice quality.  G ( 2-3 ) General Dysphonia     R ( 2 ) Roughness     B ( 1 ) Breathiness     A ( 2-3 ) Asthenia     S ( 0-1 ) Strain  Additional information: Nick denies any changes to his voice quality or function    *Validity of this measure may be slightly reduced when performed via acoustic signal from virtual visit*    Laryngeal palpation:   Thyrohyoid space: some tenderness without urge to cough    Additional observations:   Cough/ Throat Clear: not " "observed today    Breathing patterns: shallow  Overt tension: WNL at rest  Habitual pitch: elevated compared to age- and gender-matched peers  Pitch range: appropriate compared to age- and gender-matched peers  Maximum phonation time at normal pitch and loudness on /a/ vowel: not assessed today  Resonance: mid-oral focused  Loudness: moderately reduced      Laryngoscopy without stroboscopy:    Provider performing exam: Ladonna Mcqueen MD    Informed consent: Informed verbal consent was obtained, which includes potential side-effects, risks, and benefits of the procedure.     Anesthetic: Topical anesthesia with 3% lidocaine and 0.25% phenylephrine was applied the nostrils bilaterally. Viscous lidocaine 4% was applied to the tip of the endoscope.    Scope type: A distal chip flexible laryngoscope was passed through the nare with halogen light source(s).    The laryngeal and pharyngeal structures were evaluated for gross appearance, mobility, function, and focal lesions / abnormalities of the associated mucosa.  Velar Function: complete closure with slight bubbling of secretions and no weakness observed   General Appearance: WNL   Secretions: WNL   Subglottis Appearance: visible portion is patent   R Arytenoid Abduction / Adduction: symmetrical and timely ab/adduction with appropriate range of motion   L Arytenoid Abduction / Adduction: \" \"   Mediolateral Compression: mild with phonation  Anteroposterior Compression: WNL   Vocal Fold Elongation: appropriate   Left (L) Vocal Fold Edge and Mucosa: white with bowed appearance L>R  Right (R) Vocal Fold Edge and Mucosa: \" \"   Narrow Band Imaging: not utilized today  Glottic Closure: medium-sized anterior glottic gap  Therapeutic Probes:   Humming resulted in slightly increased volume and clarity  Glottal coup resulted in similar laryngeal appearance and voice quality  Increased volume resulted in slightly increased volume and clarity    FEES: Evaluation was performed by Cheyenne " "Ren and Dr. Mcqueen. 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 Perceptually, his voice is moderately to significantly weak with moderate roughness and slightly elevated pitch compared to age- and gender-matched peers. Laryngoscopy today demonstrated bilateral bowing of the true vocal folds. Given the infrequent and short-lived nature of his globus sensation, no further therapy is needed at this time. Nick is in agreement with this plan of care.       Billed Procedures:    Perceptual voice assessment 09431  Assessment and treatment time: 53 minutes  Chart review, interpretation of testing, documentation preparation, etc: 21 minutes      Thank you for allowing me to participate in this patient's care,    Maria Alejandra Masters MS CCC-SLP  Speech-Language Pathologist  [facility]  Department of Otolaryngology - Head and Neck Surgery  [facility]  [email or web address]  Direct: [phone]  [phone]    *This note may have been completed using [id number] dictation software, so errors may exist. Please contact me for clarification if needed*    "

## 2023-08-31 NOTE — PROGRESS NOTES
SPEECH LANGUAGE PATHOLOGY EVALUATION    Subjective      Presenting condition or subjective complaint: Pt seen today in conjunction with ENT clinic visit per MD request for clinical swallow evaluation.  Date of onset: 08/31/22 (pt reports onset about a year ago)    Relevant medical history: anxiety, depression    Patient goals for therapy: to get rid of globus sensation     Objective     SWALLOW EVALUTION  Dysphagia history: Pt reports globus sensation that has been ongoing since about a year ago. Globus can be triggered by eating foods. He denies coughing/throat clearing. He sometimes feels like things get stuck in the bottom 1/2 of his neck and this feeling can last for about a minute. He takes regular textures and thin liquids. He denies GERD.     Current Diet/Method of Nutritional Intake: thin liquids (level 0), regular diet        CLINICAL SWALLOW EVALUATION  Oral Motor Function: Dentition: adequate dentition  Secretion management: WFL  Mucosal quality: adequate  Mandibular function: intact  Oral labial function: WFL  Lingual function: WFL  Velar function: intact   Buccal function: intact  Laryngeal function: cough, throat clear, volitional swallow, impaired vocal quality: breathy with reduced volume        Level of assist required for feeding: no assistance needed   Textures Trialed:   Clinical Swallow Eval: Thin Liquids  Mode of presentation: straw   Volume presented: 3oz  Preparatory Phase: WFL  Oral Phase: WFL  Pharyngeal phase of swallow: intact   Diagnostic statement: PO trials evaluated under endoscopy completed by MD. No penetration/aspiration or significant residuals.     Clinical Swallow Eval: Purees  Mode of presentation: spoon   Volume presented: ~3 tablespoons  Preparatory Phase: WFL  Oral Phase: WFL  Pharyngeal phase of swallow: intact   Diagnostic statement: PO trials evaluated under endoscopy completed by MD. No penetration/aspiration or significant residuals.    Clinical Swallow Eval:  Solids  Mode of presentation: self-fed   Volume presented: 1 Latha jacques  Preparatory Phase: WFL  Oral Phase: WFL  Pharyngeal phase of swallow: intact   Diagnostic statement: PO trials evaluated under endoscopy completed by MD. No penetration/aspiration or significant residuals.    ESOPHAGEAL PHASE OF SWALLOW  no observed or reported concerns related to esophageal function     SWALLOW ASSESSMENT CLINICAL IMPRESSIONS AND RATIONALE  Diet Consistency Recommendations: thin liquids (level 0), regular diet    Recommended Feeding/Eating Techniques:  general safe swallow strategies    Medication Administration Recommendations: as tolerated  Instrumental Assessment Recommendations: instrumental evaluation not recommended at this time     Assessment & Plan   CLINICAL IMPRESSIONS   Medical Diagnosis: Dysphagia, unspecified    Treatment Diagnosis: Safe, functional oropharyngeal swallow response   Impression/Assessment: Pt presents today with a safe, functional oropharyngeal swallow response. No penetration/aspiration of significant residuals with any PO trial texture assessed under MD laryngoscopy.     PLAN OF CARE  Evaluation only    Recommended Referrals to Other Professionals:  none  Education Assessment:   Learner/Method: Patient    Risks and benefits of evaluation/treatment have been explained.   Patient/Family/caregiver agrees with Plan of Care.     Evaluation Time:    SLP Eval: oral/pharyngeal swallow function, clinical minutes (63318): 15      Signing Clinician: LILLIAN Ly

## 2023-08-31 NOTE — PATIENT INSTRUCTIONS
1.  You were seen in the ENT Clinic today by . If you have any questions or concerns after your appointment, please call [phone]. Press option #1 for scheduling related needs. Press option #3 for Nurse advice.    2.   has recommended  the following:   - consider voice therapy    3.  Plan is to return to clinic as needed      Mahi Hartmann LPN  [phone]  [facility] Otolaryngology

## 2023-09-11 DIAGNOSIS — L28.1 PRURIGO NODULARIS: ICD-10-CM

## 2023-09-11 NOTE — TELEPHONE ENCOUNTER
# Acne/folliculitis with acne excorie. Chronic, flare.   # Atopic dermatitis/prurigo nodularis. Chronic, flare.   - Start tacrolimus 0.1% twice daily as needed.   - Lidex 0.05% ointment BID PRN flares  - Can continue BP wash and clindamycin 1% lotion  - Future considerations: Dupixent

## 2023-09-14 RX ORDER — FLUOCINONIDE 0.5 MG/G
OINTMENT TOPICAL
Qty: 30 G | Refills: 11 | OUTPATIENT
Start: 2023-09-14

## 2023-09-15 ENCOUNTER — TELEPHONE (OUTPATIENT)
Dept: DERMATOLOGY | Facility: CLINIC | Age: 68
End: 2023-09-15
Payer: COMMERCIAL

## 2023-09-15 NOTE — TELEPHONE ENCOUNTER
Last Clinic Visit:   4/27/23   NV: NONE  RTC  2-3 M0S   Scheduling has been notified to contact the pt for appointment.

## 2023-09-15 NOTE — TELEPHONE ENCOUNTER
Patient Contacted and schedule the following:    Appointment type: Return  Provider: Dr. Govea  Return date: 04/19/24  Specialty phone number: [phone]

## 2023-11-20 ENCOUNTER — PATIENT OUTREACH (OUTPATIENT)
Dept: GASTROENTEROLOGY | Facility: CLINIC | Age: 68
End: 2023-11-20
Payer: COMMERCIAL

## 2023-12-12 ENCOUNTER — TELEPHONE (OUTPATIENT)
Dept: RESEARCH | Facility: CLINIC | Age: 68
End: 2023-12-12
Payer: COMMERCIAL

## 2023-12-12 DIAGNOSIS — E78.5 HYPERLIPIDEMIA LDL GOAL <130: ICD-10-CM

## 2023-12-12 RX ORDER — ATORVASTATIN CALCIUM 10 MG/1
10 TABLET, FILM COATED ORAL DAILY
Qty: 90 TABLET | Refills: 0 | Status: SHIPPED | OUTPATIENT
Start: 2023-12-12 | End: 2024-02-13

## 2023-12-12 NOTE — TELEPHONE ENCOUNTER
"    [facility] Appointment Reminder Note    Study Description: This study is designed to assess performance of an investigational Software-based hearing test against traditional Reference pure tone audiometry (PTA) as measured by median absolute deviation (MAD) in participants across a range of hearing ability.    Marvin Elaine a 68 year old male, was called today to discuss participation in the [facility] study. The following was reviewed with the participant.         Reminders  Please come 10 minutes early for your scheduled appointment time.  You will see the urgent care  when walking into the clinic and please go to your right near optometry entrance.  You may take a seat by the \"GoingOn waiting sign\" and a staff member will come out to greet you.  Parking is free.  Appointment should last approximately 3 hours, at the most.  Feel free to eat before your arrival.  Wear comfortable clothing  Wear your hearing aids if applicable    Any questions call [phone].    Address to [facility]  [address] [place] [id number]        Roro Horta RN    "

## 2023-12-12 NOTE — TELEPHONE ENCOUNTER
Prescription approved per [facility] Refill Protocol.  Luisa Espinoza, RN  [facility] Triage Nurse

## 2024-02-13 DIAGNOSIS — E78.5 HYPERLIPIDEMIA LDL GOAL <130: ICD-10-CM

## 2024-02-13 RX ORDER — ATORVASTATIN CALCIUM 10 MG/1
10 TABLET, FILM COATED ORAL DAILY
Qty: 30 TABLET | Refills: 0 | Status: SHIPPED | OUTPATIENT
Start: 2024-02-13 | End: 2024-02-27

## 2024-02-21 ENCOUNTER — TELEPHONE (OUTPATIENT)
Dept: DERMATOLOGY | Facility: CLINIC | Age: 69
End: 2024-02-21
Payer: COMMERCIAL

## 2024-02-21 NOTE — TELEPHONE ENCOUNTER
Left Voicemail (1st Attempt) and Sent Mychart (1st Attempt) for the patient to call back and schedule the following:    Appointment type: Return  Provider: Dr. Govea  Return date: 4/26/24  Specialty phone number: [phone]

## 2024-02-26 ENCOUNTER — TELEPHONE (OUTPATIENT)
Dept: DERMATOLOGY | Facility: CLINIC | Age: 69
End: 2024-02-26
Payer: COMMERCIAL

## 2024-02-26 SDOH — HEALTH STABILITY: PHYSICAL HEALTH: ON AVERAGE, HOW MANY MINUTES DO YOU ENGAGE IN EXERCISE AT THIS LEVEL?: 30 MIN

## 2024-02-26 SDOH — HEALTH STABILITY: PHYSICAL HEALTH: ON AVERAGE, HOW MANY DAYS PER WEEK DO YOU ENGAGE IN MODERATE TO STRENUOUS EXERCISE (LIKE A BRISK WALK)?: 3 DAYS

## 2024-02-26 ASSESSMENT — PATIENT HEALTH QUESTIONNAIRE - PHQ9
SUM OF ALL RESPONSES TO PHQ QUESTIONS 1-9: 1
10. IF YOU CHECKED OFF ANY PROBLEMS, HOW DIFFICULT HAVE THESE PROBLEMS MADE IT FOR YOU TO DO YOUR WORK, TAKE CARE OF THINGS AT HOME, OR GET ALONG WITH OTHER PEOPLE: NOT DIFFICULT AT ALL
SUM OF ALL RESPONSES TO PHQ QUESTIONS 1-9: 1

## 2024-02-26 ASSESSMENT — SOCIAL DETERMINANTS OF HEALTH (SDOH): HOW OFTEN DO YOU GET TOGETHER WITH FRIENDS OR RELATIVES?: MORE THAN THREE TIMES A WEEK

## 2024-02-26 NOTE — TELEPHONE ENCOUNTER
Left Voicemail (2nd Attempt), sent a letter informing pt that the following has been cancelled     Appointment type: Return  Provider: Dr. Govea  Return date: 4/26/24  Specialty phone number: [phone]

## 2024-02-27 ENCOUNTER — OFFICE VISIT (OUTPATIENT)
Dept: FAMILY MEDICINE | Facility: CLINIC | Age: 69
End: 2024-02-27
Payer: COMMERCIAL

## 2024-02-27 VITALS
HEART RATE: 67 BPM | BODY MASS INDEX: 18.85 KG/M2 | RESPIRATION RATE: 18 BRPM | WEIGHT: 124.4 LBS | OXYGEN SATURATION: 99 % | DIASTOLIC BLOOD PRESSURE: 90 MMHG | SYSTOLIC BLOOD PRESSURE: 155 MMHG | TEMPERATURE: 97.1 F | HEIGHT: 68 IN

## 2024-02-27 DIAGNOSIS — F41.9 ANXIETY: ICD-10-CM

## 2024-02-27 DIAGNOSIS — E78.5 HYPERLIPIDEMIA LDL GOAL <130: ICD-10-CM

## 2024-02-27 DIAGNOSIS — Z00.00 ENCOUNTER FOR MEDICARE ANNUAL WELLNESS EXAM: Primary | ICD-10-CM

## 2024-02-27 DIAGNOSIS — I10 HYPERTENSION GOAL BP (BLOOD PRESSURE) < 130/80: ICD-10-CM

## 2024-02-27 PROCEDURE — G0439 PPPS, SUBSEQ VISIT: HCPCS | Performed by: FAMILY MEDICINE

## 2024-02-27 PROCEDURE — 36415 COLL VENOUS BLD VENIPUNCTURE: CPT | Performed by: FAMILY MEDICINE

## 2024-02-27 PROCEDURE — 80053 COMPREHEN METABOLIC PANEL: CPT | Performed by: FAMILY MEDICINE

## 2024-02-27 PROCEDURE — 80061 LIPID PANEL: CPT | Performed by: FAMILY MEDICINE

## 2024-02-27 RX ORDER — METHYLPHENIDATE HYDROCHLORIDE 10 MG/1
10 TABLET ORAL DAILY PRN
COMMUNITY
Start: 2023-08-31

## 2024-02-27 RX ORDER — ATORVASTATIN CALCIUM 10 MG/1
10 TABLET, FILM COATED ORAL DAILY
Qty: 90 TABLET | Refills: 3 | Status: SHIPPED | OUTPATIENT
Start: 2024-02-27 | End: 2024-03-05

## 2024-02-27 RX ORDER — PROPRANOLOL HYDROCHLORIDE 10 MG/1
10 TABLET ORAL 2 TIMES DAILY
Qty: 90 TABLET | Refills: 3 | Status: SHIPPED | OUTPATIENT
Start: 2024-02-27 | End: 2024-03-05

## 2024-02-27 ASSESSMENT — ANXIETY QUESTIONNAIRES
8. IF YOU CHECKED OFF ANY PROBLEMS, HOW DIFFICULT HAVE THESE MADE IT FOR YOU TO DO YOUR WORK, TAKE CARE OF THINGS AT HOME, OR GET ALONG WITH OTHER PEOPLE?: NOT DIFFICULT AT ALL
5. BEING SO RESTLESS THAT IT IS HARD TO SIT STILL: NOT AT ALL
7. FEELING AFRAID AS IF SOMETHING AWFUL MIGHT HAPPEN: NOT AT ALL
GAD7 TOTAL SCORE: 0
4. TROUBLE RELAXING: NOT AT ALL
GAD7 TOTAL SCORE: 0
3. WORRYING TOO MUCH ABOUT DIFFERENT THINGS: NOT AT ALL
2. NOT BEING ABLE TO STOP OR CONTROL WORRYING: NOT AT ALL
7. FEELING AFRAID AS IF SOMETHING AWFUL MIGHT HAPPEN: NOT AT ALL
IF YOU CHECKED OFF ANY PROBLEMS ON THIS QUESTIONNAIRE, HOW DIFFICULT HAVE THESE PROBLEMS MADE IT FOR YOU TO DO YOUR WORK, TAKE CARE OF THINGS AT HOME, OR GET ALONG WITH OTHER PEOPLE: NOT DIFFICULT AT ALL
6. BECOMING EASILY ANNOYED OR IRRITABLE: NOT AT ALL
1. FEELING NERVOUS, ANXIOUS, OR ON EDGE: NOT AT ALL

## 2024-02-27 ASSESSMENT — PAIN SCALES - GENERAL: PAINLEVEL: NO PAIN (0)

## 2024-02-27 NOTE — PATIENT INSTRUCTIONS
Knee arthritis , no braces for now    Pain right plantar third finger mtp joint, ice if recurs.     Has skin check appointment.   Elevated blood pressue.  Check home bps resting 2-3 times a week and update me with results in 3-4 weeks.     Preventive Care Advice   This is general advice given by our system to help you stay healthy. However, your care team may have specific advice just for you. Please talk to your care team about your preventive care needs.  Nutrition  Eat 5 or more servings of fruits and vegetables each day.  Try wheat bread, brown rice and whole grain pasta (instead of white bread, rice, and pasta).  Get enough calcium and vitamin D. Check the label on foods and aim for 100% of the RDA (recommended daily allowance).  Lifestyle  Exercise at least 150 minutes each week   (30 minutes a day, 5 days a week).  Do muscle strengthening activities 2 days a week. These help control your weight and prevent disease.  No smoking.  Wear sunscreen to prevent skin cancer.  Have a dental exam and cleaning every 6 months.  Yearly exams  See your health care team every year to talk about:  Any changes in your health.  Any medicines your care team has prescribed.  Preventive care, family planning, and ways to prevent chronic diseases.  Shots (vaccines)   HPV shots (up to age 26), if you've never had them before.  Hepatitis B shots (up to age 59), if you've never had them before.  COVID-19 shot: Get this shot when it's due.  Flu shot: Get a flu shot every year.  Tetanus shot: Get a tetanus shot every 10 years.  Pneumococcal, hepatitis A, and RSV shots: Ask your care team if you need these based on your risk.  Shingles shot (for age 50 and up).  General health tests  Diabetes screening:  Starting at age 35, Get screened for diabetes at least every 3 years.  If you are younger than age 35, ask your care team if you should be screened for diabetes.  Cholesterol test: At age 39, start having a cholesterol test every 5  years, or more often if advised.  Bone density scan (DEXA): At age 50, ask your care team if you should have this scan for osteoporosis (brittle bones).  Hepatitis C: Get tested at least once in your life.  STIs (sexually transmitted infections)  Before age 24: Ask your care team if you should be screened for STIs.  After age 24: Get screened for STIs if you're at risk. You are at risk for STIs (including HIV) if:  You are sexually active with more than one person.  You don't use condoms every time.  You or a partner was diagnosed with a sexually transmitted infection.  If you are at risk for HIV, ask about PrEP medicine to prevent HIV.  Get tested for HIV at least once in your life, whether you are at risk for HIV or not.  Cancer screening tests  Cervical cancer screening: If you have a cervix, begin getting regular cervical cancer screening tests at age 21. Most people who have regular screenings with normal results can stop after age 65. Talk about this with your provider.  Breast cancer scan (mammogram): If you've ever had breasts, begin having regular mammograms starting at age 40. This is a scan to check for breast cancer.  Colon cancer screening: It is important to start screening for colon cancer at age 45.  Have a colonoscopy test every 10 years (or more often if you're at risk) Or, ask your provider about stool tests like a FIT test every year or Cologuard test every 3 years.  To learn more about your testing options, visit: https://www.[employer]/282330.pdf.  For help making a decision, visit: https://bit.ly/ck26164.  Prostate cancer screening test: If you have a prostate and are age 55 to 69, ask your provider if you would benefit from a yearly prostate cancer screening test.  Lung cancer screening: If you are a current or former smoker age 50 to 80, ask your care team if ongoing lung cancer screenings are right for you.  For informational purposes only. Not to replace the advice of your health care  provider. Copyright   2023 [facility]. All rights reserved. Clinically reviewed by the [facility] Transitions Program. [employer] 568347 - REV 01/24.    Learning About Activities of Daily Living  What are activities of daily living?     Activities of daily living (ADLs) are the basic self-care tasks you do every day. As you age, and if you have health problems, you may find that it's harder to do these things for yourself. That's when you may need some help.  Your doctor uses ADLs to measure how much help you need. Knowing what you can and can't do for yourself is an important first step to getting help. And when you have the help you need, you can stay as independent as possible.  Your doctor will want to know if you are able to do tasks such as:  Take a bath or shower without help.  Go to the bathroom by yourself.  Dress and undress without help.  Shave, comb your hair, and brush teeth on your own.  Get in and out of bed or a chair without help.  Feed yourself without help.  If you are having trouble doing basic self-care tasks, talk with your doctor. You may want to bring a caregiver or family member who can help the doctor understand your needs and abilities.  How will a doctor assess your ADLs?  Asking about ADLs is part of a routine health checkup your doctor will likely do as you age. Your health check might be done in a doctor's office, in your home, or at a hospital. The goal is to find out if you are having any problems that could make your health problems worse or that make it unsafe for you to be on your own.  To measure your ADLs, your doctor will ask how hard it is for you to do routine tasks. He or she may also want to know if you have changed the way you do a task because of a health problem. He or she may watch how you:  Walk back and forth.  Keep your balance while you stand or walk.  Move from sitting to standing or from a bed to a chair.  Button or unbutton a shirt or  sweater.  Remove and put on your shoes.  It's normal to feel a little worried or anxious if you find you can't do all the things you used to be able to do. Talking with your doctor about ADLs isn't a test that you either pass or fail. It's just a way to get more information about your health and safety.  Follow-up care is a key part of your treatment and safety. Be sure to make and go to all appointments, and call your doctor if you are having problems. It's also a good idea to know your test results and keep a list of the medicines you take.  Current as of: February 26, 2023               Content Version: 13.8    1370-0005 [employer].   Care instructions adapted under license by your healthcare professional. If you have questions about a medical condition or this instruction, always ask your healthcare professional. [employer] disclaims any warranty or liability for your use of this information.      Hearing Loss: Care Instructions  Overview     Hearing loss is a sudden or slow decrease in how well you hear. It can range from slight to profound. Permanent hearing loss can occur with aging. It also can happen when you are exposed long-term to loud noise. Examples include listening to loud music, riding motorcycles, or being around other loud machines.  Hearing loss can affect your work and home life. It can make you feel lonely or depressed. You may feel that you have lost your independence. But hearing aids and other devices can help you hear better and feel connected to others.  Follow-up care is a key part of your treatment and safety. Be sure to make and go to all appointments, and call your doctor if you are having problems. It's also a good idea to know your test results and keep a list of the medicines you take.  How can you care for yourself at home?  Avoid loud noises whenever possible. This helps keep your hearing from getting worse.  Always wear hearing protection around loud  "noises.  Wear a hearing aid as directed.  A professional can help you pick a hearing aid that will work best for you.  You can also get hearing aids over the counter for mild to moderate hearing loss.  Have hearing tests as your doctor suggests. They can show whether your hearing has changed. Your hearing aid may need to be adjusted.  Use other devices as needed. These may include:  Telephone amplifiers and hearing aids that can connect to a television, stereo, radio, or microphone.  Devices that use lights or vibrations. These alert you to the doorbell, a ringing telephone, or a baby monitor.  Television closed-captioning. This shows the words at the bottom of the screen. Most new TVs can do this.  TTY (text telephone). This lets you type messages back and forth on the telephone instead of talking or listening. These devices are also called TDD. When messages are typed on the keyboard, they are sent over the phone line to a receiving TTY. The message is shown on a monitor.  Use text messaging, social media, and email if it is hard for you to communicate by telephone.  Try to learn a listening technique called speechreading. It is not lipreading. You pay attention to people's gestures, expressions, posture, and tone of voice. These clues can help you understand what a person is saying. Face the person you are talking to, and have them face you. Make sure the lighting is good. You need to see the other person's face clearly.  Think about counseling if you need help to adjust to your hearing loss.  When should you call for help?  Watch closely for changes in your health, and be sure to contact your doctor if:    You think your hearing is getting worse.     You have new symptoms, such as dizziness or nausea.   Where can you learn more?  Go to https://www.healthwise.net/patiented  Enter R798 in the search box to learn more about \"Hearing Loss: Care Instructions.\"  Current as of: February 28, 2023               Content " Version: 13.8    8706-7342 [employer].   Care instructions adapted under license by your healthcare professional. If you have questions about a medical condition or this instruction, always ask your healthcare professional. [employer] disclaims any warranty or liability for your use of this information.      Learning About Stress  What is stress?     Stress is your body's response to a hard situation. Your body can have a physical, emotional, or mental response. Stress is a fact of life for most people, and it affects everyone differently. What causes stress for you may not be stressful for someone else.  A lot of things can cause stress. You may feel stress when you go on a job interview, take a test, or run a race. This kind of short-term stress is normal and even useful. It can help you if you need to work hard or react quickly. For example, stress can help you finish an important job on time.  Long-term stress is caused by ongoing stressful situations or events. Examples of long-term stress include long-term health problems, ongoing problems at work, or conflicts in your family. Long-term stress can harm your health.  How does stress affect your health?  When you are stressed, your body responds as though you are in danger. It makes hormones that speed up your heart, make you breathe faster, and give you a burst of energy. This is called the fight-or-flight stress response. If the stress is over quickly, your body goes back to normal and no harm is done.  But if stress happens too often or lasts too long, it can have bad effects. Long-term stress can make you more likely to get sick, and it can make symptoms of some diseases worse. If you tense up when you are stressed, you may develop neck, shoulder, or low back pain. Stress is linked to high blood pressure and heart disease.  Stress also harms your emotional health. It can make you morales, tense, or depressed. Your relationships may  suffer, and you may not do well at work or school.  What can you do to manage stress?  You can try these things to help manage stress:   Do something active. Exercise or activity can help reduce stress. Walking is a great way to get started. Even everyday activities such as housecleaning or yard work can help.  Try yoga or roshni chi. These techniques combine exercise and meditation. You may need some training at first to learn them.  Do something you enjoy. For example, listen to music or go to a movie. Practice your hobby or do volunteer work.  Meditate. This can help you relax, because you are not worrying about what happened before or what may happen in the future.  Do guided imagery. Imagine yourself in any setting that helps you feel calm. You can use online videos, books, or a teacher to guide you.  Do breathing exercises. For example:  From a standing position, bend forward from the waist with your knees slightly bent. Let your arms dangle close to the floor.  Breathe in slowly and deeply as you return to a standing position. Roll up slowly and lift your head last.  Hold your breath for just a few seconds in the standing position.  Breathe out slowly and bend forward from the waist.  Let your feelings out. Talk, laugh, cry, and express anger when you need to. Talking with supportive friends or family, a counselor, or a jodee leader about your feelings is a healthy way to relieve stress. Avoid discussing your feelings with people who make you feel worse.  Write. It may help to write about things that are bothering you. This helps you find out how much stress you feel and what is causing it. When you know this, you can find better ways to cope.  What can you do to prevent stress?  You might try some of these things to help prevent stress:  Manage your time. This helps you find time to do the things you want and need to do.  Get enough sleep. Your body recovers from the stresses of the day while you are  "sleeping.  Get support. Your family, friends, and community can make a difference in how you experience stress.  Limit your news feed. Avoid or limit time on social media or news that may make you feel stressed.  Do something active. Exercise or activity can help reduce stress. Walking is a great way to get started.  Where can you learn more?  Go to https://www.[employer].net/patiented  Enter N032 in the search box to learn more about \"Learning About Stress.\"  Current as of: February 26, 2023               Content Version: 13.8    7445-3826 [employer].   Care instructions adapted under license by your healthcare professional. If you have questions about a medical condition or this instruction, always ask your healthcare professional. [employer] disclaims any warranty or liability for your use of this information.      Substance Use Disorder: Care Instructions  Overview     You can improve your life and health by stopping your use of alcohol or drugs. When you don't drink or use drugs, you may feel and sleep better. You may get along better with your family, friends, and coworkers. There are medicines and programs that can help with substance use disorder.  How can you care for yourself at home?  Here are some ways to help you stay sober and prevent relapse.  If you have been given medicine to help keep you sober or reduce your cravings, be sure to take it exactly as prescribed.  Talk to your doctor about programs that can help you stop using drugs or drinking alcohol.  Do not keep alcohol or drugs in your home.  Plan ahead. Think about what you'll say if other people ask you to drink or use drugs. Try not to spend time with people who drink or use drugs.  Use the time and money spent on drinking or drugs to do something that's important to you.  Preventing a relapse  Have a plan to deal with relapse. Learn to recognize changes in your thinking that lead you to drink or use drugs. Get help " before you start to drink or use drugs again.  Try to stay away from situations, friends, or places that may lead you to drink or use drugs.  If you feel the need to drink alcohol or use drugs again, seek help right away. Call a trusted friend or family member. Some people get support from organizations such as Narcotics Anonymous or [employer] or from treatment facilities.  If you relapse, get help as soon as you can. Some people make a plan with another person that outlines what they want that person to do for them if they relapse. The plan usually includes how to handle the relapse and who to notify in case of relapse.  Don't give up. Remember that a relapse doesn't mean that you have failed. Use the experience to learn the triggers that lead you to drink or use drugs. Then quit again. Recovery is a lifelong process. Many people have several relapses before they are able to quit for good.  Follow-up care is a key part of your treatment and safety. Be sure to make and go to all appointments, and call your doctor if you are having problems. It's also a good idea to know your test results and keep a list of the medicines you take.  When should you call for help?   Call 911  anytime you think you may need emergency care. For example, call if you or someone else:    Has overdosed or has withdrawal signs. Be sure to tell the emergency workers that you are or someone else is using or trying to quit using drugs. Overdose or withdrawal signs may include:  Losing consciousness.  Seizure.  Seeing or hearing things that aren't there (hallucinations).     Is thinking or talking about suicide or harming others.   Where to get help 24 hours a day, 7 days a week   If you or someone you know talks about suicide, self-harm, a mental health crisis, a substance use crisis, or any other kind of emotional distress, get help right away. You can:    Call the Suicide and Crisis Lifeline at 698.     Call 4-473-031-YDFZ  "(1-412.135.8901).     Text HOME to 737154 to access the Crisis Text Line.   Consider saving these numbers in your phone.  Go to [email or web address] for more information or to chat online.  Call your doctor now or seek immediate medical care if:    You are having withdrawal symptoms. These may include nausea or vomiting, sweating, shakiness, and anxiety.   Watch closely for changes in your health, and be sure to contact your doctor if:    You have a relapse.     You need more help or support to stop.   Where can you learn more?  Go to https://www.[employer].net/patiented  Enter H573 in the search box to learn more about \"Substance Use Disorder: Care Instructions.\"  Current as of: March 21, 2023               Content Version: 13.8    1237-8047 [employer].   Care instructions adapted under license by your healthcare professional. If you have questions about a medical condition or this instruction, always ask your healthcare professional. [employer] disclaims any warranty or liability for your use of this information.      "

## 2024-02-27 NOTE — PROGRESS NOTES
Preventive Care Visit  [facility]  Joel Daniel Wegener, MD, Family Medicine  Feb 27, 2024    Assessment & Plan     Encounter for Medicare annual wellness exam    Wellness Visit Notes:    -Last colon cancer screening done via Colonscopy 3/2023, ok to discontinue (impression: No specimens collected)  -Last PSA done 12/2022 (impression: 2.86) Patient does not want to repeat this visit.     Knee arthritis , no braces for now    Pain right plantar third finger mtp joint, ice if recurs.     Has skin check appointment.   Elevated blood pressue.  Check home bps resting 2-3 times a week and update me with results in 3-4 weeks.     Hypertension goal BP (blood pressure) < 130/80    - COMPREHENSIVE METABOLIC PANEL; Future  - COMPREHENSIVE METABOLIC PANEL  - propranolol (INDERAL) 10 MG tablet; Take 1 tablet (10 mg) by mouth 2 times daily    Hyperlipidemia LDL goal <130    - Lipid panel reflex to direct LDL Non-fasting; Future  - Lipid panel reflex to direct LDL Non-fasting  - atorvastatin (LIPITOR) 40 MG tablet; Take 1 tablet (40 mg) by mouth daily    Anxiety    - propranolol (INDERAL) 10 MG tablet; Take 1 tablet (10 mg) by mouth 2 times daily    Patient has been advised of split billing requirements and indicates understanding: Yes          Counseling  Appropriate preventive services were discussed with this patient, including applicable screening as appropriate for fall prevention, nutrition, physical activity, Tobacco-use cessation, weight loss and cognition.  Checklist reviewing preventive services available has been given to the patient.          Subjective   Nick is a 68 year old, presenting for the following:  Physical (AWV)        2/27/2024    12:05 PM   Additional Questions   Roomed by Ariadna ZAMAN         Health Care Directive  Patient does not have a Health Care Directive or Living Will: Discussed advance care planning with patient; information given to patient to review.    HPI      -Immunizations: Up  to date                2/26/2024   General Health   How would you rate your overall physical health? Good   Feel stress (tense, anxious, or unable to sleep) Only a little   (!) STRESS CONCERN      2/26/2024   Nutrition   Diet: Regular (no restrictions)         2/26/2024   Exercise   Days per week of moderate/strenous exercise 3 days   Average minutes spent exercising at this level 30 min         2/26/2024   Social Factors   Frequency of gathering with friends or relatives More than three times a week   Worry food won't last until get money to buy more No   Food not last or not have enough money for food? No   Do you have housing?  Yes   Are you worried about losing your housing? No   Lack of transportation? No   Unable to get utilities (heat,electricity)? No         2/26/2024   Fall Risk   Fallen 2 or more times in the past year? No   Trouble with walking or balance? No          2/26/2024   Activities of Daily Living- Home Safety   Needs help with the following daily activites None of the above   Safety concerns in the home No grab bars in the bathroom         2/26/2024   Dental   Dentist two times every year? Yes         2/26/2024   Hearing Screening   Hearing concerns? (!) I NEED TO ASK PEOPLE TO SPEAK UP OR REPEAT THEMSELVES.    (!) IT'S HARDER TO UNDERSTAND WOMEN'S VOICES THAN MEN'S VOICES.    (!) IT'S HARD TO FOLLOW A CONVERSATION IN A NOISY RESTAURANT OR CROWDED ROOM.    (!) TROUBLE UNDESTANDING A SPEAKER IN A PUBLIC MEETING OR [employer] SERVICE.    (!) TROUBLE UNDERSTANDING SOFT OR WHISPERED SPEECH.    (!) TROUBLE UNDERSTANDING SPEECH ON THE TELEPHONE         2/26/2024   Driving Risk Screening   Patient/family members have concerns about driving No         2/26/2024   General Alertness/Fatigue Screening   Have you been more tired than usual lately? No         2/26/2024   Urinary Incontinence Screening   Bothered by leaking urine in past 6 months No         2/26/2024   TB Screening   Were you born outside of  [place]?  No       Today's PHQ-9 Score:       2/26/2024     1:13 PM   PHQ-9 SCORE   PHQ-9 Total Score MyChart 1 (Minimal depression)   PHQ-9 Total Score 1         2/26/2024   Substance Use   Alcohol more than 3/day or more than 7/wk Not Applicable   Do you have a current opioid prescription? No   How severe/bad is pain from 1 to 10? 0/10 (No Pain)   Do you use any other substances recreationally? (!) XANAX OR OTHER BENZOS     Social History     Tobacco Use    Smoking status: Never    Smokeless tobacco: Never   Vaping Use    Vaping Use: Never used   Substance Use Topics    Alcohol use: No    Drug use: No           2/26/2024   AAA Screening   Family history of Abdominal Aortic Aneurysm (AAA)? No   ASCVD Risk   The 10-year ASCVD risk score (Ezio BURNS, et al., 2019) is: 22.7%    Values used to calculate the score:      Age: 68 years      Sex: Male      Is Non- : No      Diabetic: No      Tobacco smoker: No      Systolic Blood Pressure: 155 mmHg      Is BP treated: Yes      HDL Cholesterol: 68 mg/dL      Total Cholesterol: 165 mg/dL            Reviewed and updated as needed this visit by Provider                    Past Medical History:   Diagnosis Date    Anxiety     Depressive disorder     Prostate infection      Past Surgical History:   Procedure Laterality Date    COLONOSCOPY  2017    COLONOSCOPY N/A 3/7/2023    Procedure: COLONOSCOPY;  Surgeon: Bozena Franco MD;  Location: [facility] OR    EYE SURGERY  11/21    LAPAROSCOPIC APPENDECTOMY  01/21/2012    Procedure:LAPAROSCOPIC APPENDECTOMY; Open Appendectomy; Surgeon:GERDA GASTON; Location: OR     Current providers sharing in care for this patient include:  Patient Care Team:  Wegener, Joel Daniel Irwin, MD as PCP - General (Family Medicine)  ABHAY Soto MD as MD (Dermatology)  Wegener, Joel Daniel Irwin, MD as Assigned PCP  Ana Paula Hernandez AuD as Audiologist (Audiology)  Ladonna Mcqueen MD as MD  "(Otolaryngology)  Ladonna Mcqueen MD as Assigned Surgical Provider    The following health maintenance items are reviewed in Epic and correct as of today:  Health Maintenance   Topic Date Due    PSA  12/30/2023    DTAP/TDAP/TD IMMUNIZATION (2 - Td or Tdap) 04/28/2024    MEDICARE ANNUAL WELLNESS VISIT  02/27/2025    CMP  02/27/2025    LIPID  02/27/2025    ANNUAL REVIEW OF HM ORDERS  02/27/2025    FALL RISK ASSESSMENT  02/27/2025    PHQ-9  02/27/2025    GLUCOSE  02/27/2027    ADVANCE CARE PLANNING  01/18/2028    COLORECTAL CANCER SCREENING  03/07/2033    HEPATITIS C SCREENING  Completed    DEPRESSION ACTION PLAN  Completed    INFLUENZA VACCINE  Completed    Pneumococcal Vaccine: 65+ Years  Completed    ZOSTER IMMUNIZATION  Completed    RSV VACCINE (Pregnancy & 60+)  Completed    COVID-19 Vaccine  Completed    IPV IMMUNIZATION  Aged Out    HPV IMMUNIZATION  Aged Out    MENINGITIS IMMUNIZATION  Aged Out    RSV MONOCLONAL ANTIBODY  Aged Out         Review of Systems  Constitutional, neuro, ENT, endocrine, pulmonary, cardiac, gastrointestinal, genitourinary, musculoskeletal, integument and psychiatric systems are negative, except as otherwise noted.     Objective    Exam  BP (!) 155/90   Pulse 67   Temp 97.1  F (36.2  C) (Temporal)   Resp 18   Ht 1.727 m (5' 8\")   Wt 56.4 kg (124 lb 6.4 oz)   SpO2 99%   BMI 18.91 kg/m     Estimated body mass index is 18.91 kg/m  as calculated from the following:    Height as of this encounter: 1.727 m (5' 8\").    Weight as of this encounter: 56.4 kg (124 lb 6.4 oz).    Physical Exam  GENERAL: alert and no distress  EYES: Eyes grossly normal to inspection, PERRL and conjunctivae and sclerae normal  HENT: ear canals and TM's normal, nose and mouth without ulcers or lesions  NECK: no adenopathy, no asymmetry, masses, or scars  RESP: lungs clear to auscultation - no rales, rhonchi or wheezes  CV: regular rate and rhythm, normal S1 S2, no S3 or S4, no murmur, click or rub, no peripheral " edema  ABDOMEN: soft, nontender, no hepatosplenomegaly, no masses and bowel sounds normal  MS: no gross musculoskeletal defects noted, no edema  SKIN: no suspicious lesions or rashes  NEURO: Normal strength and tone, mentation intact and speech normal  PSYCH: mentation appears normal, affect normal/bright        2/27/2024   Mini Cog   Clock Draw Score 2 Normal   3 Item Recall 3 objects recalled   Mini Cog Total Score 5            Signed Electronically by: Joel Daniel Wegener, MD    Answers submitted by the patient for this visit:  Patient Health Questionnaire (Submitted on 2/26/2024)  If you checked off any problems, how difficult have these problems made it for you to do your work, take care of things at home, or get along with other people?: Not difficult at all  PHQ9 TOTAL SCORE: 1

## 2024-02-28 LAB
ALBUMIN SERPL BCG-MCNC: 4.4 G/DL (ref 3.5–5.2)
ALP SERPL-CCNC: 115 U/L (ref 40–150)
ALT SERPL W P-5'-P-CCNC: 13 U/L (ref 0–70)
ANION GAP SERPL CALCULATED.3IONS-SCNC: 10 MMOL/L (ref 7–15)
AST SERPL W P-5'-P-CCNC: 21 U/L (ref 0–45)
BILIRUB SERPL-MCNC: 0.4 MG/DL
BUN SERPL-MCNC: 27.5 MG/DL (ref 8–23)
CALCIUM SERPL-MCNC: 9.3 MG/DL (ref 8.8–10.2)
CHLORIDE SERPL-SCNC: 103 MMOL/L (ref 98–107)
CHOLEST SERPL-MCNC: 165 MG/DL
CREAT SERPL-MCNC: 1.14 MG/DL (ref 0.67–1.17)
DEPRECATED HCO3 PLAS-SCNC: 27 MMOL/L (ref 22–29)
EGFRCR SERPLBLD CKD-EPI 2021: 69 ML/MIN/1.73M2
FASTING STATUS PATIENT QL REPORTED: NO
GLUCOSE SERPL-MCNC: 95 MG/DL (ref 70–99)
HDLC SERPL-MCNC: 68 MG/DL
LDLC SERPL CALC-MCNC: 79 MG/DL
NONHDLC SERPL-MCNC: 97 MG/DL
POTASSIUM SERPL-SCNC: 4.5 MMOL/L (ref 3.4–5.3)
PROT SERPL-MCNC: 6.7 G/DL (ref 6.4–8.3)
SODIUM SERPL-SCNC: 140 MMOL/L (ref 135–145)
TRIGL SERPL-MCNC: 92 MG/DL

## 2024-03-04 DIAGNOSIS — F41.9 ANXIETY: ICD-10-CM

## 2024-03-04 DIAGNOSIS — I10 HYPERTENSION GOAL BP (BLOOD PRESSURE) < 130/80: ICD-10-CM

## 2024-03-04 RX ORDER — PROPRANOLOL HYDROCHLORIDE 10 MG/1
10 TABLET ORAL 2 TIMES DAILY
Qty: 90 TABLET | Refills: 3 | OUTPATIENT
Start: 2024-03-04

## 2024-03-05 ENCOUNTER — MYC MEDICAL ADVICE (OUTPATIENT)
Dept: FAMILY MEDICINE | Facility: CLINIC | Age: 69
End: 2024-03-05
Payer: COMMERCIAL

## 2024-03-05 DIAGNOSIS — F41.9 ANXIETY: ICD-10-CM

## 2024-03-05 DIAGNOSIS — I10 HYPERTENSION GOAL BP (BLOOD PRESSURE) < 130/80: ICD-10-CM

## 2024-03-05 RX ORDER — PROPRANOLOL HYDROCHLORIDE 10 MG/1
10 TABLET ORAL 2 TIMES DAILY
Qty: 90 TABLET | Refills: 3 | Status: SHIPPED | OUTPATIENT
Start: 2024-03-05 | End: 2024-08-29

## 2024-03-05 RX ORDER — ATORVASTATIN CALCIUM 40 MG/1
40 TABLET, FILM COATED ORAL DAILY
Qty: 90 TABLET | Refills: 3 | Status: SHIPPED | OUTPATIENT
Start: 2024-03-05

## 2024-03-05 RX ORDER — PROPRANOLOL HYDROCHLORIDE 10 MG/1
10 TABLET ORAL 2 TIMES DAILY
Qty: 90 TABLET | Refills: 3 | Status: CANCELLED | OUTPATIENT
Start: 2024-03-05

## 2024-03-05 RX ORDER — ATORVASTATIN CALCIUM 40 MG/1
40 TABLET, FILM COATED ORAL DAILY
Qty: 90 TABLET | Refills: 1 | Status: CANCELLED | OUTPATIENT
Start: 2024-03-05

## 2024-04-19 ENCOUNTER — OFFICE VISIT (OUTPATIENT)
Dept: DERMATOLOGY | Facility: CLINIC | Age: 69
End: 2024-04-19
Payer: COMMERCIAL

## 2024-04-19 DIAGNOSIS — D22.9 MULTIPLE BENIGN NEVI: ICD-10-CM

## 2024-04-19 DIAGNOSIS — L98.9 SKIN LESION: ICD-10-CM

## 2024-04-19 DIAGNOSIS — L82.1 SEBORRHEIC KERATOSES: ICD-10-CM

## 2024-04-19 DIAGNOSIS — L57.0 ACTINIC KERATOSES: Primary | ICD-10-CM

## 2024-04-19 DIAGNOSIS — L81.4 SOLAR LENTIGO: ICD-10-CM

## 2024-04-19 DIAGNOSIS — L28.1 PRURIGO NODULARIS: ICD-10-CM

## 2024-04-19 DIAGNOSIS — D49.2 NEOPLASM OF UNSPECIFIED BEHAVIOR OF BONE, SOFT TISSUE, AND SKIN: ICD-10-CM

## 2024-04-19 DIAGNOSIS — D18.01 CHERRY ANGIOMA: ICD-10-CM

## 2024-04-19 DIAGNOSIS — L56.8 ACTINIC CHEILITIS: ICD-10-CM

## 2024-04-19 PROCEDURE — 17003 DESTRUCT PREMALG LES 2-14: CPT | Mod: XU | Performed by: DERMATOLOGY

## 2024-04-19 PROCEDURE — 11102 TANGNTL BX SKIN SINGLE LES: CPT | Performed by: DERMATOLOGY

## 2024-04-19 PROCEDURE — 88305 TISSUE EXAM BY PATHOLOGIST: CPT | Mod: TC | Performed by: DERMATOLOGY

## 2024-04-19 PROCEDURE — 99214 OFFICE O/P EST MOD 30 MIN: CPT | Mod: 25 | Performed by: DERMATOLOGY

## 2024-04-19 PROCEDURE — 17000 DESTRUCT PREMALG LESION: CPT | Mod: XS | Performed by: DERMATOLOGY

## 2024-04-19 PROCEDURE — 88305 TISSUE EXAM BY PATHOLOGIST: CPT | Mod: 26 | Performed by: DERMATOLOGY

## 2024-04-19 RX ORDER — FLUOROURACIL 50 MG/G
CREAM TOPICAL 2 TIMES DAILY
Qty: 40 G | Refills: 1 | Status: SHIPPED | OUTPATIENT
Start: 2024-04-19

## 2024-04-19 ASSESSMENT — PAIN SCALES - GENERAL: PAINLEVEL: NO PAIN (0)

## 2024-04-19 NOTE — LETTER
4/19/2024       RE: Marvin Elaine  [address]  [place] [address]     Dear Colleague,    Thank you for referring your patient, Marvin Elaine, to the [facility] [place] at [facility]. Please see a copy of my visit note below.    [facility] Note  Encounter Date: Apr 19, 2024  Office Visit     Dermatology Problem List:  1. Inflamed SK  -s/p cryotherapy   2. Xerosis cutis  -current t/x: cream based moisturizer  3. AKs. LiqN2.   4.  Acne/folliculitis with acne excorie/prurigo nodularis/atopic dermatitis.   - current tx: BP 4-5% wash, clindamycin 1% lotion, clindamycin 1% solution, tacrolimus 0.1%   - previous tx:doxycycline 100 mg PO BID/qdaily x 6 months (improvement),  bleach baths, hydrocortisone 0.2% cream  5. Seborrheic dermatitis.   - ketoconazole 2% shampoo three times per week  - Synalar solution at bedtime   6. Actinic cheilitis, efudex ordered 4/19/24    #. NUB, pink shiny plaque. The differential diagnosis includes r/o BCC . S/p shave 4/19/24  # Lesion to Monitor  - left lateral shin: 4 -5 mm pink macule dermoscopy with hemorrhage.   ____________________________________________    Assessment & Plan:    # Lesion to Monitor,  left lateral shin: 4 -5 mm pink macule dermoscopy with hemorrhage.   - ddx trauma v BCC  - photo today, recheck next visit  - Monitor: Patient to continue monitoring at home and will contact the clinic for any changes.    # Neoplasm of unspecified behavior of the skin (D49.2) on the  right forearm: pink shiny plaque. The differential diagnosis includes r/o BCC .   - Shave biopsy performed today (see procedure note(s) below).    # Actinic Keratosis. Forehead, left nasal ala,   - Cryotherapy performed today (see procedure note(s) below).   - Patient did not tolerate well. After 2 lesions were treated, we opted to treat with Efudex BID x up to 3-4 weeks as  tolerated.    #Actinic Cheilitis , lower lip  Start Efudex BID x up to 1-2 weeks as tolerated. Anticipated reaction discussed.    # Benign lesions - SKs, cherry angiomas, lentigenes.  - No treatment required    # Multiple benign nevi.   - Monitor for ABCDEs of melanoma   - Continue sun protection - recommend SPF 30 or higher with frequent application   - Return sooner if noticing changing or symptomatic lesions    # Acne/folliculitis with acne excorie. Chronic, flare.   # Atopic dermatitis/prurigo nodularis. Chronic, flare.   - not currently controlled on topicals  - will refer to MTM to start dupilumab   - Can continue tacrolimus 0.1% twice daily as needed but not currently using due to concern for side effects  - Continue Lidex 0.05% ointment BID PRN flares  - Can continue BP wash and clindamycin 1% lotion      Procedures Performed:   - Shave biopsy procedure note, location(s): right forearm. After discussion of benefits and risks including but not limited to bleeding, infection, scar, incomplete removal, recurrence, and non-diagnostic biopsy, written consent and photographs were obtained. The area was cleaned with isopropyl alcohol. 0.5mL of 1% lidocaine with epinephrine was injected to obtain adequate anesthesia of lesion(s). Shave biopsy at site(s) performed. Hemostasis was achieved with aluminium chloride. Petrolatum ointment and a sterile dressing were applied. The patient tolerated the procedure and no complications were noted. The patient was provided with verbal and written post care instructions.     Cryotherapy procedure note: After verbal consent and discussion of risks and benefits including but no limited to dyspigmentation/scar, blister, and pain, 2was(were) treated with 1-2mm freeze border for 2 cycles with liquid nitrogen. Post cryotherapy instructions were provided.  Patient did not tolerate well see above.       3 month(s) in-person, or earlier for new or changing lesions      Staff Involved:  "  Scribe/Staff    Scribe Disclosure:   I, KRIS CHARLES, am serving as a scribe; to document services personally performed by Julieta Govea MD -based on data collection and the provider's statements to me.     Provider Disclosure:   The documentation recorded by the scribe accurately reflects the services I personally performed and the decisions made by me.    Julieta Govea MD    Department of Dermatology  [facility]: Phone: [phone], Fax: [phone]  4/25/2024       ____________________________________________    CC: Skin Check (Patient would like a full body skin check. Patient reports no new spots of concern. ) and Derm Problem (Folliculitis has been flaring \"on and off\" so he would like to discuss his medications.)    HPI:  Mr. Marvin Elaine is a(n) 68 year old male who presents today as a return patient for 3 month follow up and skin exam.     - Today he expresses some concern about the treatment plan regarding his folliculitis. Notes he can control the symptoms of flare ups but would like to see less flare ups. He will get flare ups on the back, arms, and legs and has been worse on the legs. Using fluocinonide and lidocaine which assists with itching.     - Not using tacrolimus due to the side effects. Not using ketoconazole shampoo due to lack of improvement.     - No lesions of concern noted today. No changing, growing, painful, or bleeding lesions today.  No history of eczema as a child.       Patient is otherwise feeling well, without additional skin concerns.    Labs Reviewed:  N/A    Physical Exam:  Vitals: There were no vitals taken for this visit.  SKIN: Full body skin exam excluding the genitals was performed including face, scalp, neck, ears, chest, back, bilateral arms, hands, bilateral legs, feet, and buttocks.   - right forearm: pink shiny plaque  - There are dome shaped bright red " papules on the trunk and extremities .   - Multiple regular brown pigmented macules and papules are identified on the trunk and extremities. .   - Scattered brown macules on sun exposed areas.  - Waxy stuck on papules and plaques on trunk and extremities.   - scattered excoriated  papules on extremities.   - diffuse scaling on the lower lip  - left lateral shin: 4 -5 mm pink macule dermoscopy with hemorrhage.   - No other lesions of concern on areas examined.     Medications:  Current Outpatient Medications   Medication Sig Dispense Refill    atorvastatin (LIPITOR) 40 MG tablet Take 1 tablet (40 mg) by mouth daily 90 tablet 3    clindamycin (CLEOCIN T) 1 % external lotion Apply once daily after showering. 60 mL 11    clindamycin 1 % EX external solution Apply 10-15 drops once daily to the scalp. 60 mL 1    fluocinonide (LIDEX) 0.05 % external ointment Apply twice daily as needed for itchy spots. Please keep f/u appt. 30 g 5    fluocinonide (LIDEX) 0.05 % external solution Apply 10-15 drops to scalp at nighttime before bed as needed for itch/scale. 60 mL 11    ketoconazole (NIZORAL) 2 % external shampoo Lather onto forehead and scalp at least 2-3 times weekly in the shower. Let sit for 1-2 minutes before rinsing. 120 mL 9    methylphenidate (RITALIN) 10 MG tablet Take 10 mg by mouth daily as needed      PARoxetine (PAXIL) 40 MG tablet Take 1 tablet (40 mg) by mouth every morning      propranolol (INDERAL) 10 MG tablet Take 1 tablet (10 mg) by mouth 2 times daily 90 tablet 3     No current facility-administered medications for this visit.      Past Medical History:   Patient Active Problem List   Diagnosis    Anxiety    Persistent depressive disorder    CARDIOVASCULAR SCREENING; LDL GOAL LESS THAN 160    Chronic pelvic pain in male    Osteochondritis    Chronic midline low back pain without sciatica    Encounter for general adult medical examination with abnormal findings    Hyperlipidemia LDL goal <130     Past  Medical History:   Diagnosis Date    Anxiety     Depressive disorder     Prostate infection         CC No referring provider defined for this encounter. on close of this encounter.

## 2024-04-19 NOTE — NURSING NOTE
"Dermatology Rooming Note    Marvin Elaine's goals for this visit include:   Chief Complaint   Patient presents with    Skin Check     Patient would like a full body skin check. Patient reports no new spots of concern.     Derm Problem     Folliculitis has been flaring \"on and off\" so he would like to discuss his medications.      Hollis Montes, CHARLES    "

## 2024-04-19 NOTE — NURSING NOTE
Lidocaine-epinephrine 1-1:616159 % injection   0.3 mL once for one use, starting 4/19/2024 ending 4/19/2024,  2mL disp, R-0, injection  Injected by Hollis Montes CMA

## 2024-04-19 NOTE — PROGRESS NOTES
[facility] Dermatology Note  Encounter Date: Apr 19, 2024  Office Visit     Dermatology Problem List:  1. Inflamed SK  -s/p cryotherapy   2. Xerosis cutis  -current t/x: cream based moisturizer  3. AKs. LiqN2.   4.  Acne/folliculitis with acne excorie/prurigo nodularis/atopic dermatitis.   - current tx: BP 4-5% wash, clindamycin 1% lotion, clindamycin 1% solution, tacrolimus 0.1%   - previous tx:doxycycline 100 mg PO BID/qdaily x 6 months (improvement),  bleach baths, hydrocortisone 0.2% cream  5. Seborrheic dermatitis.   - ketoconazole 2% shampoo three times per week  - Synalar solution at bedtime   6. Actinic cheilitis, efudex ordered 4/19/24    #. NUB, pink shiny plaque. The differential diagnosis includes r/o BCC . S/p shave 4/19/24  # Lesion to Monitor  - left lateral shin: 4 -5 mm pink macule dermoscopy with hemorrhage.   ____________________________________________    Assessment & Plan:    # Lesion to Monitor,  left lateral shin: 4 -5 mm pink macule dermoscopy with hemorrhage.   - ddx trauma v BCC  - photo today, recheck next visit  - Monitor: Patient to continue monitoring at home and will contact the clinic for any changes.    # Neoplasm of unspecified behavior of the skin (D49.2) on the  right forearm: pink shiny plaque. The differential diagnosis includes r/o BCC .   - Shave biopsy performed today (see procedure note(s) below).    # Actinic Keratosis. Forehead, left nasal ala,   - Cryotherapy performed today (see procedure note(s) below).   - Patient did not tolerate well. After 2 lesions were treated, we opted to treat with Efudex BID x up to 3-4 weeks as tolerated.    #Actinic Cheilitis , lower lip  Start Efudex BID x up to 1-2 weeks as tolerated. Anticipated reaction discussed.    # Benign lesions - SKs, cherry angiomas, lentigenes.  - No treatment required    # Multiple benign nevi.   - Monitor for ABCDEs of melanoma   - Continue sun protection - recommend SPF 30 or higher with  frequent application   - Return sooner if noticing changing or symptomatic lesions    # Acne/folliculitis with acne excorie. Chronic, flare.   # Atopic dermatitis/prurigo nodularis. Chronic, flare.   - not currently controlled on topicals  - will refer to MTM to start dupilumab   - Can continue tacrolimus 0.1% twice daily as needed but not currently using due to concern for side effects  - Continue Lidex 0.05% ointment BID PRN flares  - Can continue BP wash and clindamycin 1% lotion      Procedures Performed:   - Shave biopsy procedure note, location(s): right forearm. After discussion of benefits and risks including but not limited to bleeding, infection, scar, incomplete removal, recurrence, and non-diagnostic biopsy, written consent and photographs were obtained. The area was cleaned with isopropyl alcohol. 0.5mL of 1% lidocaine with epinephrine was injected to obtain adequate anesthesia of lesion(s). Shave biopsy at site(s) performed. Hemostasis was achieved with aluminium chloride. Petrolatum ointment and a sterile dressing were applied. The patient tolerated the procedure and no complications were noted. The patient was provided with verbal and written post care instructions.     Cryotherapy procedure note: After verbal consent and discussion of risks and benefits including but no limited to dyspigmentation/scar, blister, and pain, 2was(were) treated with 1-2mm freeze border for 2 cycles with liquid nitrogen. Post cryotherapy instructions were provided.  Patient did not tolerate well see above.       3 month(s) in-person, or earlier for new or changing lesions      Staff Involved:   Scribe/Staff    Scribe Disclosure:   I, KRIS CHARLES, am serving as a scribe; to document services personally performed by Julieta Govea MD -based on data collection and the provider's statements to me.     Provider Disclosure:   The documentation recorded by the scribe accurately reflects the services I personally performed  "and the decisions made by me.    Julieta Govea MD    Department of Dermatology  [facility]: Phone: [phone], Fax: [phone]  4/25/2024       ____________________________________________    CC: Skin Check (Patient would like a full body skin check. Patient reports no new spots of concern. ) and Derm Problem (Folliculitis has been flaring \"on and off\" so he would like to discuss his medications.)    HPI:  Mr. Marvin Elaine is a(n) 68 year old male who presents today as a return patient for 3 month follow up and skin exam.     - Today he expresses some concern about the treatment plan regarding his folliculitis. Notes he can control the symptoms of flare ups but would like to see less flare ups. He will get flare ups on the back, arms, and legs and has been worse on the legs. Using fluocinonide and lidocaine which assists with itching.     - Not using tacrolimus due to the side effects. Not using ketoconazole shampoo due to lack of improvement.     - No lesions of concern noted today. No changing, growing, painful, or bleeding lesions today.  No history of eczema as a child.       Patient is otherwise feeling well, without additional skin concerns.    Labs Reviewed:  N/A    Physical Exam:  Vitals: There were no vitals taken for this visit.  SKIN: Full body skin exam excluding the genitals was performed including face, scalp, neck, ears, chest, back, bilateral arms, hands, bilateral legs, feet, and buttocks.   - right forearm: pink shiny plaque  - There are dome shaped bright red papules on the trunk and extremities .   - Multiple regular brown pigmented macules and papules are identified on the trunk and extremities. .   - Scattered brown macules on sun exposed areas.  - Waxy stuck on papules and plaques on trunk and extremities.   - scattered excoriated  papules on extremities.   - diffuse scaling on the lower " lip  - left lateral shin: 4 -5 mm pink macule dermoscopy with hemorrhage.   - No other lesions of concern on areas examined.     Medications:  Current Outpatient Medications   Medication Sig Dispense Refill    atorvastatin (LIPITOR) 40 MG tablet Take 1 tablet (40 mg) by mouth daily 90 tablet 3    clindamycin (CLEOCIN T) 1 % external lotion Apply once daily after showering. 60 mL 11    clindamycin 1 % EX external solution Apply 10-15 drops once daily to the scalp. 60 mL 1    fluocinonide (LIDEX) 0.05 % external ointment Apply twice daily as needed for itchy spots. Please keep f/u appt. 30 g 5    fluocinonide (LIDEX) 0.05 % external solution Apply 10-15 drops to scalp at nighttime before bed as needed for itch/scale. 60 mL 11    ketoconazole (NIZORAL) 2 % external shampoo Lather onto forehead and scalp at least 2-3 times weekly in the shower. Let sit for 1-2 minutes before rinsing. 120 mL 9    methylphenidate (RITALIN) 10 MG tablet Take 10 mg by mouth daily as needed      PARoxetine (PAXIL) 40 MG tablet Take 1 tablet (40 mg) by mouth every morning      propranolol (INDERAL) 10 MG tablet Take 1 tablet (10 mg) by mouth 2 times daily 90 tablet 3     No current facility-administered medications for this visit.      Past Medical History:   Patient Active Problem List   Diagnosis    Anxiety    Persistent depressive disorder    CARDIOVASCULAR SCREENING; LDL GOAL LESS THAN 160    Chronic pelvic pain in male    Osteochondritis    Chronic midline low back pain without sciatica    Encounter for general adult medical examination with abnormal findings    Hyperlipidemia LDL goal <130     Past Medical History:   Diagnosis Date    Anxiety     Depressive disorder     Prostate infection         CC No referring provider defined for this encounter. on close of this encounter.

## 2024-04-19 NOTE — PATIENT INSTRUCTIONS
Efudex Treatment  Today, you are being prescribed Fluorouracil (Efudex) a topical cream used for the treatment of Actinic Keratosis (AKs).  The medication is working to eliminate the unhealthy cells.  This treatment may be unattractive and somewhat uncomfortable.  You may experience some mild discomfort while being treated.  You will want to stop any other creams such as glycolic acid products, retin A, Tazorac, etc. to the area. You may use bland makeup/cover-up as long as it doesn't sting or cause you discomfort.  Apply the cream at night as your physician recommends. Use a cotton-tipped applicator, or use gloves if applying it with your fingertips. If applied with unprotected fingertips, it is important to wash your hands well after you apply this medicine.   Keep this medication away from pets.  We recommend avoiding excessive sun exposure to the treated area  You may use moisturizing creams over bothersome areas such as Vanicream or Cetaphil cream if the reaction becomes too bothersome. Please, call the clinic if this occurs.   Potential Side Effects  Your treated areas may be unsightly during therapy.  This will improve slowly following the discontinuation of therapy.   During the first week of application, mild inflammation may occur.   During the following weeks, redness, and swelling may occur with some crusting and burning.   Lesions resolve as the skin exfoliates.   Over 1 to 2 weeks, new skin grows into the treatment area.  Keep this medication away from pets  Specific side effects that usually do not require medical attention (report to your doctor or health care professional if they continue or are bothersome) include:  Red or dark-colored skin   Mild erosion (loss of upper layer of skin)   Mild eye irritation including burning, itching, sensitivity, stinging, or watering   Increased sensitivity of the skin to sun and ultraviolet light   Pain and burning of the affected area   Dryness, scaling or  swelling of the affected area   Skin rash, itching of the affected area   Tenderness   If you have severe pain, please, call the clinic immediately and indicate that you have pain.  Ask for a call from the RN.     Who should I call with questions?  [facility]: [phone]  [facility]: [phone]  For urgent needs outside of business hours call the [facility] at [phone] and ask for the dermatology resident on call   Cryotherapy    What is it?  Use of a very cold liquid, such as liquid nitrogen, to freeze and destroy abnormal skin cells that need to be removed    What should I expect?  Tenderness and redness  A small blister that might grow and fill with dark purple blood. There may be crusting.  More than one treatment may be needed if the lesions do not go away.    How do I care for the treated area?  Gently wash the area with your hands when bathing.  Use a thin layer of Vaseline to help with healing. You may use a Band-Aid.   The area should heal within 7-10 days and may leave behind a pink or lighter color.   Do not use an antibiotic or Neosporin ointment.   You may take acetaminophen (Tylenol) for pain.     Call your doctor if you have:  Severe pain  Signs of infection (warmth, redness, cloudy yellow drainage, and or a bad smell)  Questions or concerns    Who should I call with questions?      [facility]: [phone]      [facility]: [phone]      For urgent needs outside of business hours call the [facility] at [phone] and ask for the dermatology resident on call     Wound Care After a Biopsy    What is a skin biopsy?  A skin biopsy allows the doctor to examine a very small piece of tissue under the microscope to determine the diagnosis and the best treatment for the skin condition. A local anesthetic (numbing medicine) is injected with a very small  needle into the skin area to be tested. A small piece of skin is taken from the area. Sometimes a suture (stitch) is used.     What are the risks of a skin biopsy?  I will experience scar, bleeding, swelling, pain, crusting and redness. I may experience incomplete removal or recurrence. Risks of this procedure are excessive bleeding, bruising, infection, nerve damage, numbness, thick (hypertrophic or keloidal) scar and non-diagnostic biopsy.    How should I care for my wound for the first 24 hours?  Keep the wound dry and covered for 24 hours  If it bleeds, hold direct pressure on the area for 15 minutes. If bleeding does not stop, call us or go to the emergency room  Avoid strenuous exercise the first 1-2 days or as your doctor instructs you    How should I care for the wound after 24 hours?  After 24 hours, remove the bandage  You may bathe or shower as normal  If you had a scalp biopsy, you can shampoo as usual and can use shower water to clean the biopsy site daily  Clean the wound once a day with gentle soap and water  Do not scrub, be gentle  Apply white petroleum/Vaseline after cleaning the wound with a cotton swab or a clean finger, and keep the site covered with a Bandaid /bandage. Bandages are not necessary with a scalp biopsy  If you are unable to cover the site with a Bandaid /bandage, re-apply ointment 2-3 times a day to keep the site moist. Moisture will help with healing  Avoid strenuous activity for first 1-2 days  Avoid lakes, rivers, pools, and oceans until the stitches are removed or the site is healed    How do I clean my wound?  Wash hands thoroughly with soap or use hand  before all wound care  Clean the wound with gentle soap and water  Apply white petroleum/Vaseline  to wound after it is clean  Replace the Bandaid /bandage to keep the wound covered for the first few days or as instructed by your doctor  If you had a scalp biopsy, warm shower water to the area on a daily basis should  suffice    What should I use to clean my wound?   Cotton-tipped applicators (Qtips )  White petroleum jelly (Vaseline ). Use a clean new container and use Q-tips to apply.  Bandaids  as needed  Gentle soap     How should I care for my wound long term?  Do not get your wound dirty  Keep up with wound care for one week or until the area is healed.  If you have stitches, stitches need to be removed in 14 days. You may return to our clinic for this or you may have it done locally at your doctor s office.  A small scab will form and fall off by itself when the area is completely healed. The area will be red and will become pink in color as it heals. Sun protection is very important for how your scar will turn out. Sunscreen with an SPF 30 or greater is recommended once the area is healed.  You should have some soreness but it should be mild and slowly go away over several days. Talk to your doctor about using tylenol for pain,    When should I call my doctor?  If you have increased:   Pain or swelling  Pus or drainage (clear or slightly yellow drainage is ok)  Temperature over 100F  Spreading redness or warmth around wound    When will I hear about my results?  The biopsy results can take 2 weeks to come back.  Your results will automatically release to [employer] before your provider has even reviewed them.  The clinic will call you with the results, send you a [employer] message, or have you schedule a follow-up clinic or phone time to discuss the results.  Contact our clinics if you do not hear from us in 2 weeks.    Who should I call with questions?  [facility]: [phone]  [facility]: [phone]  For urgent needs outside of business hours call the [facility] at [phone] and ask for the dermatology resident on call

## 2024-04-22 LAB
PATH REPORT.COMMENTS IMP SPEC: ABNORMAL
PATH REPORT.COMMENTS IMP SPEC: ABNORMAL
PATH REPORT.COMMENTS IMP SPEC: YES
PATH REPORT.FINAL DX SPEC: ABNORMAL
PATH REPORT.GROSS SPEC: ABNORMAL
PATH REPORT.MICROSCOPIC SPEC OTHER STN: ABNORMAL
PATH REPORT.RELEVANT HX SPEC: ABNORMAL

## 2024-04-24 ENCOUNTER — TELEPHONE (OUTPATIENT)
Dept: DERMATOLOGY | Facility: CLINIC | Age: 69
End: 2024-04-24
Payer: COMMERCIAL

## 2024-04-24 NOTE — TELEPHONE ENCOUNTER
Called patient to schedule surgery with Dr. Cagle    Date of Surgery: 06/20    Surgery type: excision    Consult scheduled: Not Applicable    Has patient had mohs with us before? Not Applicable    Additional comments: emma Turner on 4/24/2024 at 9:32 AM

## 2024-04-29 ENCOUNTER — TELEPHONE (OUTPATIENT)
Dept: DERMATOLOGY | Facility: CLINIC | Age: 69
End: 2024-04-29
Payer: COMMERCIAL

## 2024-04-29 NOTE — TELEPHONE ENCOUNTER
MTM referral from: [facility] visit (referral by provider)    MTM referral outreach attempt #2 on April 29, 2024 at 12:13 PM      Outcome: Patient not reachable after several attempts, sent [employer] message    Use hbc for the carrier/Plan on the flowsheet      Lexii Mccall CPhT  MT

## 2024-05-28 ENCOUNTER — TELEPHONE (OUTPATIENT)
Dept: AUDIOLOGY | Facility: CLINIC | Age: 69
End: 2024-05-28
Payer: COMMERCIAL

## 2024-05-28 ENCOUNTER — NURSE TRIAGE (OUTPATIENT)
Dept: FAMILY MEDICINE | Facility: CLINIC | Age: 69
End: 2024-05-28
Payer: COMMERCIAL

## 2024-05-28 NOTE — TELEPHONE ENCOUNTER
Patient confirmed scheduled appointment:  Date: 9/16  Time: 10:30am  Visit type: Hearing Aid Check with Audio   Provider: Ana Paula Hernandez  Location: [facility]  Testing/imaging:   Additional notes:

## 2024-05-28 NOTE — TELEPHONE ENCOUNTER
Spoke with patient regarding concerns brought up by other audiology provider that he has swimmers ear and he is leaving for [place] tomorrow. He reported no symptoms or concerns. He spoke with PCP clinic and they advised him to follow up if symptoms arise. He also reported he would like a hearing test and hearing aid check. I will have scheduling team reach out.    Krissy Garcia, [facility]  Licensed Audiologist  [place] License #[id number]

## 2024-05-28 NOTE — TELEPHONE ENCOUNTER
[facility] Call Center    Phone Message    May a detailed message be left on voicemail: yes     Reason for Call: Other: patient is having some concerns with ears would like to talk with care team      Action Taken: Other: ENT    Travel Screening: Not Applicable

## 2024-05-28 NOTE — TELEPHONE ENCOUNTER
Call received from patient:  Leaving in 3-4 hours to go to [facility] for 2 weeks  Saw Audiology clinician today  Fluid build up behind both ears and Audiologist told him he likely has swimmer's ear  No symptoms  If does not have hearing aides in, sounds are muffled but that is typical  Does not wear hearing aides every day  No otalgia  No ear drainage  No erythema of ears  Ear canals were clear/no cerumen build up  No swimming  No neck stiffness  Afebrile   Updated COVID-19 vaccination at [facility] and [address] [address] in [place], [place] (not yet translated on [facility])    See Care Advice section of encounter for home care measures reviewed with patient.    Discussed with patient given patient does not report any symptoms, no reason to delay or cancel travel; follow home care measures and seek care locally if needed.  If ear pain, congestion, redness and/or edema develop upon returning to [place], please call to speak with a triage nurse.  Triage nurses are available 24/7.    Patient verbalized understanding and in agreement with plan.    EDUARDO Oconnell, RN-BC  [facility]        Reason for Disposition   Swimmer's Ear with no complications    Additional Information   Negative: Symptoms to not match Swimmer's Ear   Negative: Pink or red swelling behind the ear   Negative: Stiff neck (can't touch chin to chest)   Negative: Patient sounds very sick or weak to the triager   Negative: Diabetes mellitus or a weak immune system (e.g., HIV positive, cancer chemotherapy, transplant patient)   Negative: Redness or swelling of outer ear (ear lobe)   Negative: Fever > 100.4 F (38.0 C)   Negative: Severe earache pain   Negative: Yellow or green discharge from ear canal   Negative: Decreased hearing (or another adult says that the ear canal is completely blocked with discharge)   Negative: Swollen lymph node near ear   Negative: Patient wants to be seen   Negative: Diagnosis is  uncertain   Negative: Ear symptoms persist after 3 days of home treatment    Protocols used: Ear - [facility]-[place]

## 2024-06-20 ENCOUNTER — OFFICE VISIT (OUTPATIENT)
Dept: DERMATOLOGY | Facility: CLINIC | Age: 69
End: 2024-06-20
Payer: COMMERCIAL

## 2024-06-20 ENCOUNTER — TELEPHONE (OUTPATIENT)
Dept: DERMATOLOGY | Facility: CLINIC | Age: 69
End: 2024-06-20

## 2024-06-20 VITALS — SYSTOLIC BLOOD PRESSURE: 133 MMHG | HEART RATE: 66 BPM | DIASTOLIC BLOOD PRESSURE: 72 MMHG

## 2024-06-20 DIAGNOSIS — D49.2 NEOPLASM OF UNSPECIFIED BEHAVIOR OF BONE, SOFT TISSUE, AND SKIN: ICD-10-CM

## 2024-06-20 DIAGNOSIS — C44.612 BASAL CELL CARCINOMA (BCC) OF RIGHT UPPER EXTREMITY: ICD-10-CM

## 2024-06-20 PROCEDURE — 88305 TISSUE EXAM BY PATHOLOGIST: CPT | Mod: 26 | Performed by: DERMATOLOGY

## 2024-06-20 PROCEDURE — 88305 TISSUE EXAM BY PATHOLOGIST: CPT | Mod: TC | Performed by: DERMATOLOGY

## 2024-06-20 PROCEDURE — 11602 EXC TR-EXT MAL+MARG 1.1-2 CM: CPT | Mod: GC | Performed by: DERMATOLOGY

## 2024-06-20 PROCEDURE — 12032 INTMD RPR S/A/T/EXT 2.6-7.5: CPT | Mod: GC | Performed by: DERMATOLOGY

## 2024-06-20 NOTE — LETTER
6/20/2024       RE: Marvin Elaine  [address]  [facility] [address]     Dear Colleague,    Thank you for referring your patient, Marvin Elaine, to the [facility] DERMATOLOG[facility] SURGERY CLINIC [place] at [facility]. Please see a copy of my visit note below.    DERMATOLOGY EXCISION PROCEDURE NOTE    Dermatology Problem List:  1. Inflamed SK  -s/p cryotherapy   2. Xerosis cutis  -current t/x: cream based moisturizer  3. AKs. LiqN2.   4.  Acne/folliculitis with acne excorie/prurigo nodularis/atopic dermatitis.   - current tx: BP 4-5% wash, clindamycin 1% lotion, clindamycin 1% solution, tacrolimus 0.1%   - previous tx:doxycycline 100 mg PO BID/qdaily x 6 months (improvement),  bleach baths, hydrocortisone 0.2% cream  5. Seborrheic dermatitis.   - ketoconazole 2% shampoo three times per week  - Synalar solution at bedtime   6. Actinic cheilitis, efudex ordered 4/19/24  7. NMSC  - nmBCC, right forearm, s/p shave 4/19/24, s/p exc 6/20/24     # Lesion to Monitor  - left lateral shin: 4 -5 mm pink macule dermoscopy with hemorrhage.     NAME OF PROCEDURE: Excision intermediate layered linear closure  Staff surgeon: Brandon Cagle MD  Resident: Alexandrea Sierra MD  Scrub Nurse: Marilee Renae RN    PRE-OPERATIVE DIAGNOSIS:  Basal Cell Carcinoma  POST-OPERATIVE DIAGNOSIS: Same   LOCATION: R Forearm  FINAL EXCISION SIZE(DEFECT SIZE): 1.8 cm  MARGIN: 0.4 cm  FINAL REPAIR LENGTH: 4 cm   ANESTHESIA: 6cc Lidocaine 1% with epi 1 : 100,000      INDICATIONS: This patient presented with a 1.0cm Basal Cell Carcinoma. Excision was indicated. We discussed the principles of treatment and most likely complications including scarring, bleeding, infection, incomplete excision, wound dehiscence, pain, nerve damage, and recurrence. Informed consent was obtained and the patient underwent the procedure as follows:    PROCEDURE: The patient was taken to the operative suite.  Time-out was performed.  The treatment area was anesthetized with 1% lidocaine with epinephrine. The area was prepped with Chlorhexidine and rinsed with sterile saline and draped with sterile towels. The lesion was delineated and excised down to subcutaneous fat in a elliptical manner. Hemostasis was obtained by electrocoagulation.     REPAIR: An intermediate layered linear closure was selected as the procedure which would maximally preserve both function and cosmesis.    After the excision of the tumor, the area was carefully undermined. Hemostasis was obtained with electrocoagulation.  Closure was oriented so that the wound was in the patient's natural skin tension lines. The subcutaneous and dermal layers were then closed with 4.0 monocryl sutures. The epidermis was then carefully approximated along the length of the wound using 5.0  FAGsimple running sutures.     Estimated blood loss was less than 10 ml for all surgical sites. A sterile pressure dressing was applied and wound care instructions, with a written handout, were given. The patient was discharged from the [facility] alert and ambulatory.    The patient elected for pathology results to automatically release and understands that the clinical staff will contact them as soon as possible to notify them of the results.    Follow-up in n/a weeks for suture removal.    Dr. Brandon Cagle was immediately available for the entire surgery and was physicially present for the key portions of the procedure.    Anatomic Pathology Results: pending    Clinical Follow-Up: PRN    Staff Involved:   Scribe/Staff    Scribe Disclosure:   I, KRIS CHARLES, am serving as a scribe; to document services personally performed by Brandon Cagle MD -based on data collection and the provider's statements to me.     Attending attestation:  I personally performed the entire procedure.  I have reviewed the note and edited it as necessary, and agree with its contents.    Brandon ALONZO  Tsering REZA  Professor  Director of Dermatologic Surgery  Department of Dermatology  [facility]    Dermatology Surgery Clinic  [facility] and Surgery [address] [address]

## 2024-06-20 NOTE — PROGRESS NOTES
DERMATOLOGY EXCISION PROCEDURE NOTE    Dermatology Problem List:  1. Inflamed SK  -s/p cryotherapy   2. Xerosis cutis  -current t/x: cream based moisturizer  3. AKs. LiqN2.   4.  Acne/folliculitis with acne excorie/prurigo nodularis/atopic dermatitis.   - current tx: BP 4-5% wash, clindamycin 1% lotion, clindamycin 1% solution, tacrolimus 0.1%   - previous tx:doxycycline 100 mg PO BID/qdaily x 6 months (improvement),  bleach baths, hydrocortisone 0.2% cream  5. Seborrheic dermatitis.   - ketoconazole 2% shampoo three times per week  - Synalar solution at bedtime   6. Actinic cheilitis, efudex ordered 4/19/24  7. NMSC  - nmBCC, right forearm, s/p shave 4/19/24, s/p exc 6/20/24     # Lesion to Monitor  - left lateral shin: 4 -5 mm pink macule dermoscopy with hemorrhage.     NAME OF PROCEDURE: Excision intermediate layered linear closure  Staff surgeon: Brandon Cagle MD  Resident: Alexandrea Sierra MD  Scrub Nurse: Marilee Renae RN    PRE-OPERATIVE DIAGNOSIS:  Basal Cell Carcinoma  POST-OPERATIVE DIAGNOSIS: Same   LOCATION: R Forearm  FINAL EXCISION SIZE(DEFECT SIZE): 1.8 cm  MARGIN: 0.4 cm  FINAL REPAIR LENGTH: 4 cm   ANESTHESIA: 6cc Lidocaine 1% with epi 1 : 100,000      INDICATIONS: This patient presented with a 1.0cm Basal Cell Carcinoma. Excision was indicated. We discussed the principles of treatment and most likely complications including scarring, bleeding, infection, incomplete excision, wound dehiscence, pain, nerve damage, and recurrence. Informed consent was obtained and the patient underwent the procedure as follows:    PROCEDURE: The patient was taken to the operative suite. Time-out was performed.  The treatment area was anesthetized with 1% lidocaine with epinephrine. The area was prepped with Chlorhexidine and rinsed with sterile saline and draped with sterile towels. The lesion was delineated and excised down to subcutaneous fat in a elliptical manner. Hemostasis was obtained by electrocoagulation.      REPAIR: An intermediate layered linear closure was selected as the procedure which would maximally preserve both function and cosmesis.    After the excision of the tumor, the area was carefully undermined. Hemostasis was obtained with electrocoagulation.  Closure was oriented so that the wound was in the patient's natural skin tension lines. The subcutaneous and dermal layers were then closed with 4.0 monocryl sutures. The epidermis was then carefully approximated along the length of the wound using 5.0  FAGsimple running sutures.     Estimated blood loss was less than 10 ml for all surgical sites. A sterile pressure dressing was applied and wound care instructions, with a written handout, were given. The patient was discharged from the [facility] alert and ambulatory.    The patient elected for pathology results to automatically release and understands that the clinical staff will contact them as soon as possible to notify them of the results.    Follow-up in n/a weeks for suture removal.    Dr. Brandon Cagle was immediately available for the entire surgery and was physicially present for the key portions of the procedure.    Anatomic Pathology Results: pending    Clinical Follow-Up: PRN    Staff Involved:   Scribe/Staff    Scribe Disclosure:   I, KRIS CHARLES, am serving as a scribe; to document services personally performed by Brandon Cagle MD -based on data collection and the provider's statements to me.     Attending attestation:  I personally performed the entire procedure.  I have reviewed the note and edited it as necessary, and agree with its contents.    Brandon Cagle M.D.  Professor  Director of Dermatologic Surgery  Department of Dermatology  [facility]    Dermatology Surgery Clinic  [facility] [place] [id number]             no

## 2024-06-20 NOTE — PATIENT INSTRUCTIONS
Wound care    I will experience scar, bleeding, swelling, pain, crusting and redness. I may experience incomplete removal or recurrence. Risks are bleeding, bruising, swelling, infection, nerve damage, & a large wound. A second procedure may be recommended to obtain the best cosmetic or functional result.       A three month office visit with your Surgeon is recommended for scar evaluation. Please reach out sooner if you have concerns about you surgical site/wound.    Caring for your skin after surgery    After your surgery, a pressure bandage will be placed over the area that has stitches. This is important to prevent bleeding. Please follow these instructions over the next 1 to 2 weeks. Following this regimen will help to prevent complications as your wound heals.     For the first 48 hours after your surgery:    Leave the pressure dressing on and keep it dry. If it should come loose, you may re-tape it, but do not take it off.  Relax and take it easy. Do not do any vigorous exercise or heavy lifting. This could cause the wound to bleed.  Post-Operative pain is usually mild. If you are able to take tylenol, You may take plain or extra-strength Tylenol (acetaminophen) As directed on the bottle (do not take more than 4,000mg in one day). If you are able to take ibuprofen, you can alternate the tylenol and ibuprofen.   Avoid alcohol as this may increase your tendency to bleed.   You may put an ice pack around the bandaged area for 20 minutes at a time as needed. This may help reduce swelling, bruising, and pain. Make sure the ice pack is waterproof so that the pressure bandage doesn t get wet.  If the wound is on the face try to sleep with your head elevated. Either in a recliner or propped up in bed, this will decrease swelling around the eyes.   You may see a small amount of drainage or blood on your pressure bandage. This is normal. However:  If drainage or bleeding continues or saturates the bandage, you will  "need to apply firm pressure over the bandage with a piece of gauze for 15 minutes.  If bleeding continues after applying pressure for 15 minutes, apply an ice pack to the bandaged area for 15 minutes.  If bleeding still continues, call our office or go to the nearest emergency room.    Remove pressure dressing 48 hours after surgery:    Carefully remove the pressure bandage. If it seems sticky or too difficult to get off, you may need to soak it off in the shower.  After the pressure dressing is removed, you may shower and get the wound wet. However, Do Not let the forceful stream of the shower hit the wound directly.  Follow these wound care and dressing change instructions:   In the shower wash the surgical site last with its own separate wash cloth.  You may allow water to run over the site. Take a clean wash cloth wet with soapy warm water and gently pat the suture site to help remove any crust or drainage.   Do Not rub or scrub the site    After site is clean pat dry and apply a thin layer of Vaseline ointment  over the suture site with a cotton swab or clean finger.   Cover the suture site with Telfa (non-stick) dressing. You may tape a piece of gauze over the Telfa for extra protection if you wish.  Continue wound care at least once a day, twice if you are active or around a contaminated environment.  Continue daily wound care until your surgical site is completely healed.   Dissolving stitches, if you have been told your stitches are dissolving they should dissolve in one to one and a half week.      If you are able to take acetaminophen (\"Tylenol,\" etc.) and ibuprofen (\"Advil\" or \"Motrin,\" etc.), then you may STAGGER these medications by taking 400 mg of ibuprofen (usually two tabs) every 8 hours and 1,000 mg of acetaminophen (e.g., two tabs of extra-strength Tylenol) every 8 hours.    This means, for example, that you could take the followin,000 mg of Tylenol, followed 4 hours later by 400 mg " Ibuprofen, followed 4 hours later with 1,000 mg of Tylenol, followed 4 hours later by 400 mg Ibuprofen, followed 4 hours later with 1,000 mg of Tylenol, and so forth.     Essentially, you can take either 1,000 mg of Tylenol or 400 mg of ibuprofen in alternating fashion EVERY FOUR HOURS.    Do NOT exceed more than 4,000 mg of Tylenol or 3,200 mg of ibuprofen per 24 hours. If you are not able to take Tylenol or ibuprofen as above due to other health issues (or a physician has told you directly that you are not allowed to take one of them, say due to pre-existing severe liver or kidney issues), then disregard the above directions.    Scientific evidence supports that this combination/schedule of pain medications is just as effective, if not more effective, than taking a narcotic pain medicine.       Follow up will be a 3 month scar evaluation either in person or via a telephone visit with you sending in a photo via [employer]. Unless you have been told to follow up sooner or if you have concerns and would like to be see sooner. Please call or send us in a [employer] message if possible and attach a photo.        What to expect:    The first couple of days your wound may be tender and may bleed slightly when doing wound care.  There may be swelling and bruising around the wound, especially if it is near the eyes. For your comfort, you may apply ice or cold compresses to the bruises after your have removed the pressure bandage.  The area around your wound may be numb for several weeks or even months.  You may experience periodic sharp pain or mild itching around the wound as it heals.   The suture line will look dark for a while but will lighten over time.       When to call us:    You have bleeding that will not stop after applying pressure and ice.  You have pain that is not controlled with Tylenol (acetaminophen.)  You have signs or symptoms of an infection such as:  Fever over 100 degrees Fahrenheit  Redness, warmth or  foul-smelling drainage from the wound  If you have any questions, or are not sure how to take care of the wound.    Phone numbers:    During business hours (M-F 8:00-4:30 p.m.)    [facility]- [phone] (Option 1 appt. Ask the call representative for the Dermatology Surgery Team)    For Dermatology Surgery scheduling please call Mallika at [phone]    ---------------------------------------------------------  Evenings/Weekends/Holidays    Hospital - [phone] (Ask for the dermatology resident on call. They will connect with the surgery Fellow)  TTY for hearing sntobqnd-[phone]  *Ask  to page dermatologist on-call  Emergency Zlxg-[phone]  TTY for hearing impaired- [phone]

## 2024-06-20 NOTE — TELEPHONE ENCOUNTER
Follow up call attempted & left voicemail following excision procedure with Dr. Cagle.       Are you having pain?   Are you taking pain medication?   Are you applying ice?    Have you had any noticeable bleeding through the bandage?    Do you have any other concerns?        Please call [phone] if you have any questions or concerns.

## 2024-06-20 NOTE — NURSING NOTE
Chief Complaint   Patient presents with    Derm Problem     R clyde NGUYỄN, RN-BSN  Dermatology Surgery  [phone]

## 2024-06-21 LAB
PATH REPORT.COMMENTS IMP SPEC: NORMAL
PATH REPORT.COMMENTS IMP SPEC: NORMAL
PATH REPORT.FINAL DX SPEC: NORMAL
PATH REPORT.GROSS SPEC: NORMAL
PATH REPORT.MICROSCOPIC SPEC OTHER STN: NORMAL
PATH REPORT.RELEVANT HX SPEC: NORMAL

## 2024-07-03 ENCOUNTER — MYC MEDICAL ADVICE (OUTPATIENT)
Dept: FAMILY MEDICINE | Facility: CLINIC | Age: 69
End: 2024-07-03
Payer: COMMERCIAL

## 2024-07-15 ENCOUNTER — OFFICE VISIT (OUTPATIENT)
Dept: FAMILY MEDICINE | Facility: CLINIC | Age: 69
End: 2024-07-15
Payer: COMMERCIAL

## 2024-07-15 VITALS
SYSTOLIC BLOOD PRESSURE: 118 MMHG | DIASTOLIC BLOOD PRESSURE: 78 MMHG | WEIGHT: 124.6 LBS | RESPIRATION RATE: 16 BRPM | HEART RATE: 65 BPM | HEIGHT: 68 IN | TEMPERATURE: 98.2 F | OXYGEN SATURATION: 99 % | BODY MASS INDEX: 18.88 KG/M2

## 2024-07-15 DIAGNOSIS — H65.22 LEFT CHRONIC SEROUS OTITIS MEDIA: Primary | ICD-10-CM

## 2024-07-15 PROCEDURE — 99213 OFFICE O/P EST LOW 20 MIN: CPT | Performed by: FAMILY MEDICINE

## 2024-07-15 ASSESSMENT — PAIN SCALES - GENERAL: PAINLEVEL: NO PAIN (0)

## 2024-07-15 NOTE — PATIENT INSTRUCTIONS
"ASSESSMENT AND PLAN  1. Left chronic serous otitis media  Use oxymetazolone nasal spray three sprays three times daily for three days and ibuprofen 200mg (over the counter strength) three pills three times daily for three days.  Taught how to \"pop\" ears.     ENT referral given for follow up to address prior to hearing/audiology re-check.   - Adult ENT  Referral; Future        "

## 2024-07-15 NOTE — PROGRESS NOTES
"  ASSESSMENT AND PLAN  1. Left chronic serous otitis media  Use oxymetazolone nasal spray three sprays three times daily for three days and ibuprofen 200mg (over the counter strength) three pills three times daily for three days.  Taught how to \"pop\" ears.     ENT referral given for follow up to address prior to hearing/audiology re-check.   - Adult ENT  Referral; Future          Subjective   Nick is a 68 year old, presenting for the following health issues:  Ear Problem        7/15/2024     7:08 AM   Additional Questions   Roomed by Lucy LISA MA apprentice   Accompanied by n/a     History of Present Illness       Reason for visit:  Supposed fluid in ear  Symptoms include:  Apparent only to hearing aid tech   He is taking medications regularly.     Concern - Fluid in ear  Onset: Was told by hearing aid tech the third week in may, was re told two weeks ago (7/1/24)  Description: dizziness from time to time, but otherwise no symptoms  Intensity: mild  Progression of Symptoms:  same  Accompanying Signs & Symptoms: n/a  Previous history of similar problem: no  Precipitating factors:        Worsened by: n/a  Alleviating factors:        Improved by: n/a  Therapies tried and outcome: None                 Objective    /78 (BP Location: Right arm, Patient Position: Sitting, Cuff Size: Adult Regular)   Pulse 65   Temp 98.2  F (36.8  C) (Oral)   Resp 16   Ht 1.734 m (5' 8.25\")   Wt 56.5 kg (124 lb 9.6 oz)   SpO2 99%   BMI 18.81 kg/m    Body mass index is 18.81 kg/m .  Physical Exam   Right ear without cerumen or visible air/fluid line and TYMPANIC MEMBRANE moves with pneumatic otoscopy.     Left tympanic membrane with decreased mobility and faint air-fluid level without erythema or inflammation.              Signed Electronically by: Joel Daniel Wegener, MD    "

## 2024-07-26 ENCOUNTER — MYC MEDICAL ADVICE (OUTPATIENT)
Dept: FAMILY MEDICINE | Facility: CLINIC | Age: 69
End: 2024-07-26
Payer: COMMERCIAL

## 2024-08-23 ENCOUNTER — TELEPHONE (OUTPATIENT)
Dept: FAMILY MEDICINE | Facility: CLINIC | Age: 69
End: 2024-08-23
Payer: COMMERCIAL

## 2024-08-23 NOTE — TELEPHONE ENCOUNTER
JOHN,    Patient calling,    Patient would like to try and change blood pressure medication due to propranolol side effects.  Patient has had fatigue and nightmares.    Patient states blood pressure has been approximately 120s/70s on current regimen.    Roland VELOZ RN

## 2024-08-23 NOTE — TELEPHONE ENCOUNTER
Medication Question or Refill    Contacts       Contact Date/Time Type Contact Phone/Fax    08/23/2024 12:22 PM CDT Phone (Incoming) Nick Elaine (Self) [phone] (M)            What medication are you calling about (include dose and sig)?: patient is requesting to change to something other than Propranolol, per provider recommendation    Preferred Pharmacy:             [id number] - [place], [place] - [address]  [address] [address]  Phone: [phone] Fax: [phone]      Controlled Substance Agreement on file:   CSA -- Patient Level:    CSA: None found at the patient level.       Who prescribed the medication?: pcp    Do you need a refill? Yes    When did you use the medication last? 8/23/24    Patient offered an appointment? No    Do you have any questions or concerns?  Yes: would like to change due to fatigue,sleepiness and nightmares.      Could we send this information to you in [facility] or would you prefer to receive a phone call?:   Patient would prefer a phone call   Okay to leave a detailed message?: Yes at Cell number on file:    Telephone Information:   Mobile [phone]

## 2024-08-26 NOTE — TELEPHONE ENCOUNTER
Please help him to schedule visit of some kind (virtual or in-person) to discuss possibilities in more detail.   Joel Wegener,MD w

## 2024-08-29 ENCOUNTER — VIRTUAL VISIT (OUTPATIENT)
Dept: FAMILY MEDICINE | Facility: CLINIC | Age: 69
End: 2024-08-29
Payer: COMMERCIAL

## 2024-08-29 DIAGNOSIS — I10 HYPERTENSION GOAL BP (BLOOD PRESSURE) < 130/80: Primary | ICD-10-CM

## 2024-08-29 DIAGNOSIS — R53.83 OTHER FATIGUE: ICD-10-CM

## 2024-08-29 PROCEDURE — 99442 PR PHYSICIAN TELEPHONE EVALUATION 11-20 MIN: CPT | Mod: 93 | Performed by: FAMILY MEDICINE

## 2024-08-29 NOTE — PROGRESS NOTES
"Nick is a 68 year old who is being evaluated via a billable telephone visit.    What phone number would you like to be contacted at? [phone]   How would you like to obtain your AVS? [employer]  Originating Location (pt. Location): Home    Distant Location (provider location):  On-site    Assessment & Plan     Hypertension goal BP (blood pressure) < 130/80  Was started on low-dose propranolol for mixture of recent increased blood pressue (although traditionally low) and in effort to potentially help anxiety.  Soon after starting developed worsening fatigue and nightmares and felt this was side effect to med (which it can be) .  Tried to \"wait it out\" but has not improved.     Plan:   Check home blood pressue 2-3 times week after sitting/resting for 5-10 minutes.   Stop propranolol  Will take about one week for med to fully wear off so please update me with home bps at that time.     If actually needing additional medication discussed losartan as a good next option (would need to screen for hyperkalemia a few weeks after starting).     Will plan to update me via [facility].     Other fatigue  After residential, taking two small naps a day and also worse with propranolol .  Mainly want to tell me so we can keep track of this.  Could consider re-checking thyroid test in future.  Discussed true sleepiness only caused by lack of sleep.  Will monitor for now.                 Subjective   Nick is a 68 year old, presenting for the following health issues:  Recheck Medication (Patient wants to discuss Propranolol alternatives)        8/29/2024    12:47 PM   Additional Questions   Roomed by Ariadna ZAMAN     History of Present Illness       Reason for visit:  Propranolol check   He is taking medications regularly.                   Objective    Vitals - Patient Reported  Pain Score: No Pain (0)        Physical Exam   General: Alert and no distress //Respiratory: No audible wheeze, cough, or shortness of breath // Psychiatric:  " Appropriate affect, tone, and pace of words            Phone call duration: 13 minutes  Signed Electronically by: Joel Daniel Wegener, MD

## 2024-09-11 ENCOUNTER — OFFICE VISIT (OUTPATIENT)
Dept: DERMATOLOGY | Facility: CLINIC | Age: 69
End: 2024-09-11
Payer: COMMERCIAL

## 2024-09-11 DIAGNOSIS — L56.8 ACTINIC CHEILITIS: ICD-10-CM

## 2024-09-11 DIAGNOSIS — L73.9 FOLLICULITIS: Primary | ICD-10-CM

## 2024-09-11 DIAGNOSIS — L57.0 ACTINIC KERATOSES: ICD-10-CM

## 2024-09-11 PROCEDURE — 99214 OFFICE O/P EST MOD 30 MIN: CPT | Performed by: DERMATOLOGY

## 2024-09-11 RX ORDER — MUPIROCIN 20 MG/G
OINTMENT TOPICAL
Qty: 30 G | Refills: 1 | Status: SHIPPED | OUTPATIENT
Start: 2024-09-11

## 2024-09-11 NOTE — LETTER
9/11/2024       RE: Marvin Elaine  [address]  [place] [address]     Dear Colleague,    Thank you for referring your patient, Marvin Elaine, to the [facility] [place] [facility]. Please see a copy of my visit note below.    [facility] Dermatology Note  Encounter Date: Sep 11, 2024  Office Visit     Dermatology Problem List:  Last skin check 4/19/24  # NMSC  - n & micronod BCC, right forearm, s/p shave 4/19/24, s/p exc 6/20/24  # Actinic cheilitis, good response to efudex 4/19/24  # Folliculitis, R forearm, adjacent to excision site  - Mupirocin ointment   #. AKs. LiqN2.   #  Acne/folliculitis with acne excorie/prurigo nodularis/atopic dermatitis.   - current tx: BP 4-5% wash, clindamycin 1% lotion, clindamycin 1% solution, tacrolimus 0.1%   - previous tx:doxycycline 100 mg PO BID/qdaily x 6 months (improvement),  bleach baths, hydrocortisone 0.2% cream  # Seborrheic dermatitis.   - ketoconazole 2% shampoo three times per week  - Synalar solution at bedtime      # Lesion to Monitor  - left lateral shin: 4 -5 mm pink macule dermoscopy with hemorrhage.     ____________________________________________    Assessment & Plan:    # Actinic Keratosis. Forehead, nose.  # Actinic cheilitis.   s/p Efudex with good response. Some recurrence.  - can repeat Efudex BID prn, for up to 1-2 weeks on lips and up to 3-4 weeks prn on temples/forehead    # Folliculitis, adjacent to BCC 06/2024 excision, R forearm. Patient reports pustule type lesions that developed and resolved in area. Reviewed photos sent by patient.   - Prescription provided for mupirocin ointment. Apply BID when pustules appear.     # LTM - L lateral shin - we meant to recheck today but forgot, will send mychart message.    Procedures Performed:   none    Follow-up: 7 month(s) in-person for skin check, or earlier for new or changing lesions    Staff  and Scribe:     Scribe Disclosure:   I, Flory Moore, am serving as a scribe to document services personally performed by Julieta Govea MD based on data collection and the provider's statements to me.       Provider Disclosure:   The documentation recorded by the scribe accurately reflects the services I personally performed and the decisions made by me.    Julieta Govea MD    Department of Dermatology  [facility]: Phone: [phone], Fax: [phone]  9/11/2024     ____________________________________________    CC: Derm Problem (Post efudex treatment of AK on forehead area. He states that he had a moderate reaction. He also would like his excision site on his right forearm checked today.)    HPI:  Mr. Marvin Elaine is a(n) 68 year old male who presents today as a return patient for recheck.    Last seen in dermatology 6/20/24 by Dr Brandon Cagle for excision of BCC on the R forearm.    Today, patient reports he is here for recheck on forehead AK. Reports a moderate reaction to efudex. He would also like the excision site on R forearm checked. He reports some pimple type lesions developed on top/next to incision which resolved.      Patient is otherwise feeling well, without additional skin concerns.    Labs Reviewed:  Last Derm Path 4/19/24  Final Diagnosis   Right forearm:  - Basal cell carcinoma, nodular and micronodular types       Physical Exam:  Vitals: There were no vitals taken for this visit.  SKIN: Focused examination of Face and R forearm was performed.  - Well healed linear scar. Reviewed photo of adjacent pustule.   - There are erythematous macules with overyling adherent scale on the lateral forehead, some scaling on the lip.   - No other lesions of concern on areas examined.     Medications:  Current Outpatient Medications   Medication Sig Dispense Refill     atorvastatin (LIPITOR) 40 MG tablet Take  1 tablet (40 mg) by mouth daily 90 tablet 3     fluocinonide (LIDEX) 0.05 % external ointment Apply twice daily as needed for itchy spots. Please keep f/u appt. 30 g 5     fluocinonide (LIDEX) 0.05 % external solution Apply 10-15 drops to scalp at nighttime before bed as needed for itch/scale. 60 mL 11     ketoconazole (NIZORAL) 2 % external shampoo Lather onto forehead and scalp at least 2-3 times weekly in the shower. Let sit for 1-2 minutes before rinsing. 120 mL 9     methylphenidate (RITALIN) 10 MG tablet Take 10 mg by mouth daily as needed       PARoxetine (PAXIL) 40 MG tablet Take 1 tablet (40 mg) by mouth every morning       clindamycin (CLEOCIN T) 1 % external lotion Apply once daily after showering. (Patient not taking: Reported on 9/11/2024) 60 mL 11     clindamycin 1 % EX external solution Apply 10-15 drops once daily to the scalp. (Patient not taking: Reported on 9/11/2024) 60 mL 1     fluorouracil (EFUDEX) 5 % external cream Apply topically 2 times daily To lower lip for 1-2 weeks as tolerated, stop when significant irritation occurs (Patient not taking: Reported on 9/11/2024) 40 g 1     No current facility-administered medications for this visit.      Past Medical History:   Patient Active Problem List   Diagnosis     Anxiety     Persistent depressive disorder     CARDIOVASCULAR SCREENING; LDL GOAL LESS THAN 160     Chronic pelvic pain in male     Osteochondritis     Chronic midline low back pain without sciatica     Encounter for general adult medical examination with abnormal findings     Hyperlipidemia LDL goal <130     Past Medical History:   Diagnosis Date     Anxiety      Depressive disorder      Prostate infection         CC Referred Self, MD  No address on file on close of this encounter.      Again, thank you for allowing me to participate in the care of your patient.      Sincerely,    Julieta Govea MD

## 2024-09-11 NOTE — PROGRESS NOTES
[facility] Dermatology Note  Encounter Date: Sep 11, 2024  Office Visit     Dermatology Problem List:  Last skin check 4/19/24  # NMSC  - n & micronod BCC, right forearm, s/p shave 4/19/24, s/p exc 6/20/24  # Actinic cheilitis, good response to efudex 4/19/24  # Folliculitis, R forearm, adjacent to excision site  - Mupirocin ointment   #. AKs. LiqN2.   #  Acne/folliculitis with acne excorie/prurigo nodularis/atopic dermatitis.   - current tx: BP 4-5% wash, clindamycin 1% lotion, clindamycin 1% solution, tacrolimus 0.1%   - previous tx:doxycycline 100 mg PO BID/qdaily x 6 months (improvement),  bleach baths, hydrocortisone 0.2% cream  # Seborrheic dermatitis.   - ketoconazole 2% shampoo three times per week  - Synalar solution at bedtime      # Lesion to Monitor  - left lateral shin: 4 -5 mm pink macule dermoscopy with hemorrhage.     ____________________________________________    Assessment & Plan:    # Actinic Keratosis. Forehead, nose.  # Actinic cheilitis.   s/p Efudex with good response. Some recurrence.  - can repeat Efudex BID prn, for up to 1-2 weeks on lips and up to 3-4 weeks prn on temples/forehead    # Folliculitis, adjacent to BCC 06/2024 excision, R forearm. Patient reports pustule type lesions that developed and resolved in area. Reviewed photos sent by patient.   - Prescription provided for mupirocin ointment. Apply BID when pustules appear.     # LTM - L lateral shin - we meant to recheck today but forgot, will send [employer] message.    Procedures Performed:   none    Follow-up: 7 month(s) in-person for skin check, or earlier for new or changing lesions    Staff and Scribe:     Scribe Disclosure:   Flory SLAUGHTER, am serving as a scribe to document services personally performed by Julieta Govea MD based on data collection and the provider's statements to me.       Provider Disclosure:   The documentation recorded by the scribe accurately reflects the services I personally  performed and the decisions made by me.    Julieta Govea MD    Department of Dermatology  [facility]: Phone: [phone], Fax: [phone]  9/11/2024     ____________________________________________    CC: Derm Problem (Post efudex treatment of AK on forehead area. He states that he had a moderate reaction. He also would like his excision site on his right forearm checked today.)    HPI:  Mr. Marvin Elaine is a(n) 68 year old male who presents today as a return patient for recheck.    Last seen in dermatology 6/20/24 by Dr Brandon Cagle for excision of BCC on the R forearm.    Today, patient reports he is here for recheck on forehead AK. Reports a moderate reaction to efudex. He would also like the excision site on R forearm checked. He reports some pimple type lesions developed on top/next to incision which resolved.      Patient is otherwise feeling well, without additional skin concerns.    Labs Reviewed:  Last Derm Path 4/19/24  Final Diagnosis   Right forearm:  - Basal cell carcinoma, nodular and micronodular types       Physical Exam:  Vitals: There were no vitals taken for this visit.  SKIN: Focused examination of Face and R forearm was performed.  - Well healed linear scar. Reviewed photo of adjacent pustule.   - There are erythematous macules with overyling adherent scale on the lateral forehead, some scaling on the lip.   - No other lesions of concern on areas examined.     Medications:  Current Outpatient Medications   Medication Sig Dispense Refill    atorvastatin (LIPITOR) 40 MG tablet Take 1 tablet (40 mg) by mouth daily 90 tablet 3    fluocinonide (LIDEX) 0.05 % external ointment Apply twice daily as needed for itchy spots. Please keep f/u appt. 30 g 5    fluocinonide (LIDEX) 0.05 % external solution Apply 10-15 drops to scalp at nighttime before bed as needed for itch/scale. 60 mL 11    ketoconazole  (NIZORAL) 2 % external shampoo Lather onto forehead and scalp at least 2-3 times weekly in the shower. Let sit for 1-2 minutes before rinsing. 120 mL 9    methylphenidate (RITALIN) 10 MG tablet Take 10 mg by mouth daily as needed      PARoxetine (PAXIL) 40 MG tablet Take 1 tablet (40 mg) by mouth every morning      clindamycin (CLEOCIN T) 1 % external lotion Apply once daily after showering. (Patient not taking: Reported on 9/11/2024) 60 mL 11    clindamycin 1 % EX external solution Apply 10-15 drops once daily to the scalp. (Patient not taking: Reported on 9/11/2024) 60 mL 1    fluorouracil (EFUDEX) 5 % external cream Apply topically 2 times daily To lower lip for 1-2 weeks as tolerated, stop when significant irritation occurs (Patient not taking: Reported on 9/11/2024) 40 g 1     No current facility-administered medications for this visit.      Past Medical History:   Patient Active Problem List   Diagnosis    Anxiety    Persistent depressive disorder    CARDIOVASCULAR SCREENING; LDL GOAL LESS THAN 160    Chronic pelvic pain in male    Osteochondritis    Chronic midline low back pain without sciatica    Encounter for general adult medical examination with abnormal findings    Hyperlipidemia LDL goal <130     Past Medical History:   Diagnosis Date    Anxiety     Depressive disorder     Prostate infection         CC Referred Self, MD  No address on file on close of this encounter.

## 2024-09-11 NOTE — NURSING NOTE
Dermatology Rooming Note    Marvin Elaine's goals for this visit include:   Chief Complaint   Patient presents with    Derm Problem     Post efudex treatment of AK on forehead area. He states that he had a moderate reaction. He also would like his excision site on his right forearm checked today.     Hollis BLANK CMA

## 2024-09-16 ENCOUNTER — OFFICE VISIT (OUTPATIENT)
Dept: AUDIOLOGY | Facility: CLINIC | Age: 69
End: 2024-09-16
Payer: COMMERCIAL

## 2024-09-16 DIAGNOSIS — H90.3 SENSORINEURAL HEARING LOSS (SNHL) OF BOTH EARS: Primary | ICD-10-CM

## 2024-09-16 PROCEDURE — 92550 TYMPANOMETRY & REFLEX THRESH: CPT | Performed by: AUDIOLOGIST

## 2024-09-16 PROCEDURE — 92557 COMPREHENSIVE HEARING TEST: CPT | Performed by: AUDIOLOGIST

## 2024-09-16 NOTE — PROGRESS NOTES
AUDIOLOGY REPORT    SUBJECTIVE:  Marvin Elaine is a 68 year old male who was seen in the Audiology Clinic at the [facility] for audiologic evaluation, referred by Self. They were not accompanied today by anyone. The patient has been seen previously in this clinic on 1/6/23 for assessment and results indicated normal hearing sloping to a moderately severe sensorineural hearing loss bilaterally. He was seen on 6/26/23 for air and bone thresholds only as part of research study. The patient has been seen previously at [facility] was fit with binaural -in-the-ear hearing aids around 2022. The patient reports possible decrease in hearing, but unsure if it is hearing or hearing aids. He recently had  replaced and improvement was noted. He denies aural fullness, ear pain, drainage, ear surgeries, dizziness, or falls. Marvin reports tinnitus is still present, but not bothersome. Of note, patient was seen by primary care provider on 7/15/24 for left serous otitis media after provider at [facility] noted fluid (patient did not have any symptoms).  Marvin was given nasal spray. The patient notes difficulty with communication in a variety of listening situations.    OBJECTIVE:  Abuse Screening:  Do you feel unsafe at home or work/school? No  Do you feel threatened by someone? No  Does anyone try to keep you from having contact with others, or doing things outside of your home? No  Physical signs of abuse present? No     Fall Risk Screen:  1. Have you fallen two or more times in the past year? No  2. Have you fallen and had an injury in the past year? No    Otoscopic exam indicates ears are clear of cerumen bilaterally     Pure Tone Thresholds assessed using conventional audiometry with good  reliability from 250-8000 Hz bilaterally using insert earphones     RIGHT:  borderline-normal sloping to moderate-severe sensorineural hearing loss    LEFT:     borderline-normal sloping to moderate-severe sensorineural hearing loss    Tympanogram:    RIGHT: normal eardrum mobility    LEFT:   normal eardrum mobility    Reflexes (reported by stimulus ear):  RIGHT: Ipsilateral is present at normal levels  RIGHT: Contralateral is present at elevated levels  LEFT:   Ipsilateral is present at normal levels  LEFT:   Contralateral is present at normal levels      Speech Reception Threshold:    RIGHT: 30 dB HL    LEFT:   30 dB HL  Word Recognition Score:     RIGHT: 92% at 75 dB HL using NU-6 recorded word list.    LEFT:   96% at 75 dB HL using NU-6 recorded word list.    Patient reports hearing aids were just cleaned and did not need them cleaned/checked today. He states he knows devices can not be programmed here and must be programmed at [facility]. He reports he did bring his Apple Airpods and would like those programmed. Based on patient report, discussed that Apple devices are programmed independently by patient. We were able to program them as part of the study, but are unable to anymore now that study has ended. He expressed understanding and agreement.     ASSESSMENT: Today's evaluation indicates bilateral sensorineural hearing loss. Compared to patient's previous audiogram dated 6/26/23, right ear improved 10-15 dB from 4-8 kHz and left ear improved 15 dB at 6 kHz. Compared to audiogram dated 1/6/23 word recognition scores are stable bilaterally.Today s results were discussed with the patient in detail. He was given a printed copy of his audiogram today.     PLAN:  Patient was counseled regarding hearing loss and impact on communication. Patient continues to be a good candidate for amplification at this time. It is recommended that the patient recheck hearing if changes are noted or concerns arise.  Marvin will return to [facility] for follow up hearing aid services as needed. Please call this clinic with questions regarding these results or  recommendations.        Christa Alamo CCC-A  Licensed Audiologist   [place] #[id number]

## 2024-11-02 DIAGNOSIS — L21.9 DERMATITIS, SEBORRHEIC: ICD-10-CM

## 2024-11-04 NOTE — TELEPHONE ENCOUNTER
Medication Requested:   Disp Refills Start End JASON   ketoconazole (NIZORAL) 2 % external shampoo 120 mL 9 6/13/2023 -- No   Sig: Lather onto forehead and scalp at least 2-3 times weekly in the shower. Let sit for 1-2 minutes before rinsing.     ----------------------  Last Office Visit : 9/11/2024  [facility] [place]    Julieta Govea MD  Dermatology  Future Office visit:     3/14/2025 10:15 AM (15 min)  [place]   Arrive by: 10:00 AM   RETURN DERMATOLOGY   [facility] ([facility])   Julieta Govea MD     ----------------------        Refill decision: Refill pended and routed to the provider for review/determination due to the following criteria not met:     Medication unable to be refilled by RN due to criteria not met as indicated.                 []Supervision - Pt not seen within past 12 months, no future appointment              []Compliance - lapse in therapy/gap in refills              []Verification - order discrepancy, clarification needed              []Controlled medication              [x]Medication not on [facility], [facility], [facility] refill protocol   [] Abnormal labs/test:  [] Overdue labs/test:    []Kasie refill given x1, Pt did not follow-up, pended to care team for decision  [] Medication not active on Pt's med list  [] Drug interaction Warning  [] Medication is Reported/Historical              []Other:     Pass/Fail Protocol Criteria:    Antifungal Agents [id number]/02/2024 12:20 PM   Protocol Details Recent (12 mo) or future (90 days) visit within the authorizing provider's specialty    Always Fail Criteria    Medication is active on med list

## 2024-11-05 RX ORDER — KETOCONAZOLE 20 MG/ML
SHAMPOO, SUSPENSION TOPICAL
Qty: 120 ML | Refills: 11 | Status: SHIPPED | OUTPATIENT
Start: 2024-11-06

## 2025-01-14 NOTE — TELEPHONE ENCOUNTER
JW,      Patient requesting referral for colonoscopy  Last saw you 10/21/2021.  Future OV for physical with you 12/30.    Order T'd up.    Thank you,  Edwige Del Rosario RN     Weight Management Initial Visit    Meg Rolle is a 49 year old female who is being seen for Medical Weight Management    The patient is a 49-year-old female who comes in today for initial weight management.    She has been grappling with weight issues since her 30s, with a peak weight of 197 pounds recorded 5 years ago. She attributes her weight gain to hormonal changes and medication use, including birth control pills. She recalls a significant weight increase of 60 pounds following the use of Mirena and Depo-Provera, which was subsequently managed with a hysterectomy. She has attempted various diets, including Noom, which resulted in weight gain. Her diet typically consists of two meals per day, with exceptions on Tuesdays when she skips lunch due to her work schedule. She consumes water, coffee, and 1 to 3 sodas per week. She has been making efforts to reduce her soda intake and does not consume alcohol or smoke. She identifies as an emotional eater, particularly at work, but does not engage in binge eating. She has been on Saxenda since May 2024, but discontinued it a few weeks ago due to concerns about constipation and potential urinary tract infections (UTIs). She reports no significant increase in appetite since discontinuing Saxenda. She has previously tried phentermine for weight loss but discontinued it due to side effects of dry mouth and a sour taste. She has not tried any other weight loss medications. She has been on blood pressure medication for the past 3 to 4 years. She has a history of umbilical hernia and hiatal hernia, which she believes may have contributed to her weight gain. She has a history of fatty liver, diagnosed in August 2022, and experiences left-sided pain. She has a history of GERD and esophageal spasms, which she manages by avoiding food before bedtime. She has a history of asthma, which is triggered by exertion, and uses an inhaler as needed. She has a history of  migraines, which are triggered by bright lights, and uses Ajovy injections for management. She has a history of fibromyalgia, which causes chronic pain, and uses lidocaine patches for pain management. She has a history of hip arthritis and has received two injections, the last of which caused significant soreness. She has a history of sleep disturbances, for which she takes a sleeping pill. She has a history of chronic sinus allergies and uses Dupixent for asthma management. She has a history of bladder issues and has had two bladder slings. She has a family history of cervical cancer in her mother and breast cancer in her aunt. She has a history of multiple foot surgeries, which have limited her physical activity. She has recently returned to the gym and uses a stationary bike for exercise. She has a history of thyroid issues but is not currently on thyroid medication. She has a history of pelvic floor issues and has undergone physical therapy. She has a history of elevated liver enzymes and low HDL cholesterol. She has a history of rheumatoid arthritis and has seen a rheumatologist. She has a history of chronic pain and fatigue, which she attributes to her fibromyalgia and foot issues. She has a history of insomnia and uses a sleeping pill, which she finds somewhat helpful. She has a history of hot flashes and uses a fan and adjusts the temperature in her house to manage them. She has a history of snoring and uses a CPAP machine. She has a history of chronic sinus allergies and uses Dupixent for asthma management.     SOCIAL HISTORY  She works in orthopedics at the . She does not smoke. She rarely drinks alcohol.    FAMILY HISTORY  Her mother had cervical cancer. Her aunt had breast cancer.    MEDICATIONS  Current: losartan, Dupixent, Ajovy, lidocaine patches  Discontinued: Saxenda, phentermine       Intake Questions    Weight Loss Interest  How did you learn about the weight management program or who  referred you?: Dr Barrera  How does your weight affect your life and health?: I keep falling apart more and more  Please rank (1-10) how motivated you are to lose weight:: 9  Please rank (1-10) how confident you are in being able to lose weight:: 6    Weight History  At what age(s) did you start gaining extra weight?: 30s  Did you ever gain more than 20 pounds in less than three months?: Yes  If so, when?: 16 years ago  What events in your life may have caused your weight gain?: Other  Other events:: hormones and meds  What makes weight loss hard for you?: Time, Feeling tired, Other  Other things that make it hard:: lots of pain  Have you ever taken medications to lose weight?: Phen/Fen  Phentermine/Fenfluramine  What programs or diets have you tried? : Noom  Noom - About how much weight did you lose?: gained  Noom - Are you still doing this program?: Yes    Nutritional History  About how many times do you eat per day (including meals and snacks)?: 3-4  What beverages do you drink?: water,coffee, 1-3 sodas a week  Do you wake up from sleeping at night to eat? Y / N: No  Do you have any food cravings? (check any that apply): Sweets         Exercising  Does anything limit you from exercising?: my whole body hurts    BMI   BMI Readings from Last 4 Encounters:   01/14/25 27.60 kg/m²   12/31/24 27.13 kg/m²   10/25/24 27.12 kg/m²   10/08/24 27.28 kg/m²     Blood Pressure / Pulse  BP Readings from Last 4 Encounters:   01/14/25 118/88   12/17/24 (!) 136/90   11/12/24 (!) 153/93   11/10/24 127/83      Pulse Readings from Last 4 Encounters:   01/14/25 85   12/17/24 74   11/12/24 85   11/10/24 (!) 100      Weight  Wt Readings from Last 4 Encounters:   01/14/25 72.9 kg (160 lb 12.8 oz)   12/31/24 71.7 kg (158 lb 1.1 oz)   10/25/24 71.7 kg (158 lb)   10/08/24 72.1 kg (158 lb 14.4 oz)     Waist Circumference   38 in (01/14/25 1313)     Sleep Apnea Screening   Stop Bang Total Score (out of 8): 3   Risk Level: Intermediate Risk  (Score 3-4)    Review of Systems  As documented above.  Admits to recent weight gain of more than 10 pounds, fatigue, ankle swelling or extremities, abdominal pain, abdominal bloating, constipation, dysphagia, indigestion, heartburn, gas bloating, shortness of breath leading to asthma, excessive sweating, hair changes, hot flashes, cold intolerance, inability to concentrate, urinary frequency/urgency, nighttime urination, headaches, weakness, insomnia, upper back pain, lower back pain, joint pain, muscle aches    Objective    Vitals:    01/14/25 1257   BP: 118/88   Pulse: 85   Temp: 97.8 °F (36.6 °C)   TempSrc: Tympanic   SpO2: 98%   Weight: 72.9 kg (160 lb 12.8 oz)   Height: 5' 4\" (1.626 m)   BMI (Calculated): 27.6     Physical Exam  General: Alert.    Respiratory: Normal respiratory effort.    Musculoskeletal: Gait: normal.  Psychiatric: Mood is normal and affect is normal.    LABS         Assessment & Plan    1. Weight management.  Her body fat percentage is elevated, while her muscle mass is notably low. The distribution of body fat is predominantly in the upper body compared to the lower body. Her visceral fat level is 21, which is above the ideal range of less than 10. Her basal metabolic rate is also low, likely due to her reduced muscle mass. She has a history of hepatic steatosis, which can sometimes indicate insulin resistance. Her liver enzymes were elevated, and her HDL cholesterol was low during her last lab work in September 2024. Her thyroid function was within normal limits at that time. She is advised to eliminate regular soda from her diet or switch to a zero-sugar alternative. She is encouraged to maintain a high-protein, low-carbohydrate diet, with a daily intake of over 100 g of protein and under 100 g of carbohydrates, ideally sourced from fruits and vegetables. She is recommended to use the [employer] julius to track her dietary intake. She is advised to engage in 150 minutes of moderate exercise  per week, supplemented by 2 days of strength training. She is also encouraged to consider the HMR program as a potential treatment option. Fasting labs have been ordered to assess her A1c, insulin level, glucose, and cholesterol levels. She is provided with a home or chair exercise routine that she can perform independently. She is not considered a candidate for bariatric surgery at this time. If insulin resistance is detected in her blood work, the use of metformin may be considered.    2. Fibromyalgia.  She reports chronic pain and has been using lidocaine patches for management. She prefers not to add more medications for pain. She is advised to continue with the lidocaine patches and to avoid ice packs due to a previous adverse reaction.    3. Hypertension.  She is currently on losartan for blood pressure management.    4. Asthma.  She is on Dupixent for asthma management. She reports that her asthma is triggered by exertion. She is advised to continue using her inhaler as needed during exercise.    5. Migraines.  She is using Ajovy injections for migraine management. She reports that bright lights trigger her migraines. She is advised to continue her current treatment regimen and to avoid bright lights when possible.    6. Hip arthritis.  She has received 2 injections for hip arthritis but experienced severe soreness after the last injection. She is advised to avoid further injections and ice packs due to a previous adverse reaction.    7. Gastroesophageal reflux disease (GERD).  She reports managing her GERD by avoiding food before bedtime and limiting her intake of certain foods. She is advised to continue these management strategies.    8. Urinary tract infections (UTIs).  She has experienced recurrent UTIs and has undergone pelvic floor physical therapy. She is advised to monitor for any signs of UTI and to maintain good hydration.    9. Hepatic steatosis.  She has a history of fatty liver, diagnosed in  August 2022. She is advised to follow a high-protein, low-carb diet and to avoid alcohol to manage her liver condition. Fasting labs have been ordered to reassess her liver function.    10. Sleep disturbances.  She reports poor sleep quality despite using a sleeping pill. She is advised to discuss the potential use of progesterone with her OB/GYN to help improve her sleep.    Follow-up  The patient will follow up in 3 months.  1. Sarcopenia  2. Overweight (BMI 25.0-29.9)  -     Comprehensive Metabolic Panel; Future  -     Glycohemoglobin; Future  -     CBC with Automated Differential; Future  -     Vitamin D -25 Hydroxy; Future  -     Insulin Level, Fasting; Future  -     Lipid Panel With Reflex; Future  3. Moderate persistent asthma without complication (CMD)  4. Primary osteoarthritis of left hip  5. Essential (primary) hypertension  Assessment & Plan:  Complication of their weight, continue efforts at weight management   6. Gastroesophageal reflux disease without esophagitis  7. Hepatic steatosis  Assessment & Plan:  Complication of their weight, continue efforts at weight management       Return in about 3 months (around 4/14/2025) for Follow up Medical Weight Management.    63 min spent with this patient encounter

## 2025-01-23 ENCOUNTER — MYC MEDICAL ADVICE (OUTPATIENT)
Dept: DERMATOLOGY | Facility: CLINIC | Age: 70
End: 2025-01-23
Payer: COMMERCIAL

## 2025-01-23 DIAGNOSIS — L28.1 PRURIGO NODULARIS: ICD-10-CM

## 2025-01-23 DIAGNOSIS — L20.9 ATOPIC DERMATITIS, UNSPECIFIED TYPE: Primary | ICD-10-CM

## 2025-01-23 RX ORDER — HYDROCORTISONE VALERATE 2 MG/G
OINTMENT TOPICAL 2 TIMES DAILY
Qty: 60 G | Refills: 3 | Status: SHIPPED | OUTPATIENT
Start: 2025-01-23

## 2025-01-24 NOTE — TELEPHONE ENCOUNTER
Encounter Date: Sep 11, 2024  Office Visit      Dermatology Problem List:  Last skin check 4/19/24  # NMSC  - n & micronod BCC, right forearm, s/p shave 4/19/24, s/p exc 6/20/24  # Actinic cheilitis, good response to efudex 4/19/24  # Folliculitis, R forearm, adjacent to excision site  - Mupirocin ointment   #. AKs. LiqN2.   #  Acne/folliculitis with acne excorie/prurigo nodularis/atopic dermatitis.   - current tx: BP 4-5% wash, clindamycin 1% lotion, clindamycin 1% solution, tacrolimus 0.1%   - previous tx:doxycycline 100 mg PO BID/qdaily x 6 months (improvement),  bleach baths, hydrocortisone 0.2% cream  # Seborrheic dermatitis.   - ketoconazole 2% shampoo three times per week  - Synalar solution at bedtime      # Lesion to Monitor  - left lateral shin: 4 -5 mm pink macule dermoscopy with hemorrhage.      ____________________________________________     Assessment & Plan:     # Actinic Keratosis. Forehead, nose.  # Actinic cheilitis.   s/p Efudex with good response. Some recurrence.  - can repeat Efudex BID prn, for up to 1-2 weeks on lips and up to 3-4 weeks prn on temples/forehead     # Folliculitis, adjacent to BCC 06/2024 excision, R forearm. Patient reports pustule type lesions that developed and resolved in area. Reviewed photos sent by patient.   - Prescription provided for mupirocin ointment. Apply BID when pustules appear.      # LTM - L lateral shin - we meant to recheck today but forgot, will send [employer] message.

## 2025-01-25 DIAGNOSIS — E78.5 HYPERLIPIDEMIA LDL GOAL <130: ICD-10-CM

## 2025-01-27 RX ORDER — ATORVASTATIN CALCIUM 40 MG/1
40 TABLET, FILM COATED ORAL DAILY
Qty: 90 TABLET | Refills: 0 | OUTPATIENT
Start: 2025-01-27

## 2025-02-21 PROBLEM — M54.50 ACUTE BILATERAL LOW BACK PAIN WITHOUT SCIATICA: Status: ACTIVE | Noted: 2025-02-21

## 2025-03-01 SDOH — HEALTH STABILITY: PHYSICAL HEALTH: ON AVERAGE, HOW MANY MINUTES DO YOU ENGAGE IN EXERCISE AT THIS LEVEL?: 30 MIN

## 2025-03-01 SDOH — HEALTH STABILITY: PHYSICAL HEALTH: ON AVERAGE, HOW MANY DAYS PER WEEK DO YOU ENGAGE IN MODERATE TO STRENUOUS EXERCISE (LIKE A BRISK WALK)?: 1 DAY

## 2025-03-01 ASSESSMENT — SOCIAL DETERMINANTS OF HEALTH (SDOH): HOW OFTEN DO YOU GET TOGETHER WITH FRIENDS OR RELATIVES?: MORE THAN THREE TIMES A WEEK

## 2025-03-04 ENCOUNTER — OFFICE VISIT (OUTPATIENT)
Dept: FAMILY MEDICINE | Facility: CLINIC | Age: 70
End: 2025-03-04
Attending: FAMILY MEDICINE
Payer: COMMERCIAL

## 2025-03-04 VITALS
BODY MASS INDEX: 18.44 KG/M2 | HEART RATE: 73 BPM | DIASTOLIC BLOOD PRESSURE: 75 MMHG | RESPIRATION RATE: 18 BRPM | OXYGEN SATURATION: 99 % | HEIGHT: 68 IN | SYSTOLIC BLOOD PRESSURE: 139 MMHG | WEIGHT: 121.7 LBS | TEMPERATURE: 98.4 F

## 2025-03-04 DIAGNOSIS — M79.641 PAIN OF RIGHT HAND: ICD-10-CM

## 2025-03-04 DIAGNOSIS — Z00.00 ENCOUNTER FOR MEDICARE ANNUAL WELLNESS EXAM: Primary | ICD-10-CM

## 2025-03-04 DIAGNOSIS — M79.642 PAIN OF LEFT HAND: ICD-10-CM

## 2025-03-04 DIAGNOSIS — R25.2 MUSCLE CRAMPS: ICD-10-CM

## 2025-03-04 DIAGNOSIS — Z12.5 SCREENING FOR PROSTATE CANCER: ICD-10-CM

## 2025-03-04 DIAGNOSIS — E78.5 HYPERLIPIDEMIA LDL GOAL <130: ICD-10-CM

## 2025-03-04 DIAGNOSIS — I10 HYPERTENSION GOAL BP (BLOOD PRESSURE) < 130/80: ICD-10-CM

## 2025-03-04 LAB
ALBUMIN SERPL BCG-MCNC: 4 G/DL (ref 3.5–5.2)
ALP SERPL-CCNC: 181 U/L (ref 40–150)
ALT SERPL W P-5'-P-CCNC: 41 U/L (ref 0–70)
ANION GAP SERPL CALCULATED.3IONS-SCNC: 10 MMOL/L (ref 7–15)
AST SERPL W P-5'-P-CCNC: 35 U/L (ref 0–45)
BILIRUB SERPL-MCNC: 0.6 MG/DL
BUN SERPL-MCNC: 22.6 MG/DL (ref 8–23)
CALCIUM SERPL-MCNC: 9.8 MG/DL (ref 8.8–10.4)
CALCIUM SERPL-MCNC: 9.8 MG/DL (ref 8.8–10.4)
CHLORIDE SERPL-SCNC: 102 MMOL/L (ref 98–107)
CHOLEST SERPL-MCNC: 149 MG/DL
CREAT SERPL-MCNC: 1.04 MG/DL (ref 0.67–1.17)
EGFRCR SERPLBLD CKD-EPI 2021: 77 ML/MIN/1.73M2
FASTING STATUS PATIENT QL REPORTED: NO
FASTING STATUS PATIENT QL REPORTED: NO
GLUCOSE SERPL-MCNC: 103 MG/DL (ref 70–99)
HCO3 SERPL-SCNC: 29 MMOL/L (ref 22–29)
HDLC SERPL-MCNC: 71 MG/DL
LDLC SERPL CALC-MCNC: 64 MG/DL
MAGNESIUM SERPL-MCNC: 2.2 MG/DL (ref 1.7–2.3)
NONHDLC SERPL-MCNC: 78 MG/DL
POTASSIUM SERPL-SCNC: 5 MMOL/L (ref 3.4–5.3)
PROT SERPL-MCNC: 6.4 G/DL (ref 6.4–8.3)
PSA SERPL DL<=0.01 NG/ML-MCNC: 4.45 NG/ML (ref 0–6.5)
PTH-INTACT SERPL-MCNC: 38 PG/ML (ref 15–65)
SODIUM SERPL-SCNC: 141 MMOL/L (ref 135–145)
TRIGL SERPL-MCNC: 68 MG/DL
TSH SERPL DL<=0.005 MIU/L-ACNC: 1.24 UIU/ML (ref 0.3–4.2)

## 2025-03-04 PROCEDURE — 83735 ASSAY OF MAGNESIUM: CPT | Performed by: FAMILY MEDICINE

## 2025-03-04 PROCEDURE — 84443 ASSAY THYROID STIM HORMONE: CPT | Performed by: FAMILY MEDICINE

## 2025-03-04 PROCEDURE — 36415 COLL VENOUS BLD VENIPUNCTURE: CPT | Performed by: FAMILY MEDICINE

## 2025-03-04 PROCEDURE — 80053 COMPREHEN METABOLIC PANEL: CPT | Performed by: FAMILY MEDICINE

## 2025-03-04 PROCEDURE — 80061 LIPID PANEL: CPT | Performed by: FAMILY MEDICINE

## 2025-03-04 PROCEDURE — G0103 PSA SCREENING: HCPCS | Performed by: FAMILY MEDICINE

## 2025-03-04 PROCEDURE — 83970 ASSAY OF PARATHORMONE: CPT | Performed by: FAMILY MEDICINE

## 2025-03-04 RX ORDER — ATORVASTATIN CALCIUM 40 MG/1
40 TABLET, FILM COATED ORAL DAILY
Qty: 90 TABLET | Refills: 3 | Status: SHIPPED | OUTPATIENT
Start: 2025-03-04

## 2025-03-04 ASSESSMENT — PATIENT HEALTH QUESTIONNAIRE - PHQ9
SUM OF ALL RESPONSES TO PHQ QUESTIONS 1-9: 1
SUM OF ALL RESPONSES TO PHQ QUESTIONS 1-9: 1
10. IF YOU CHECKED OFF ANY PROBLEMS, HOW DIFFICULT HAVE THESE PROBLEMS MADE IT FOR YOU TO DO YOUR WORK, TAKE CARE OF THINGS AT HOME, OR GET ALONG WITH OTHER PEOPLE: NOT DIFFICULT AT ALL

## 2025-03-04 ASSESSMENT — ANXIETY QUESTIONNAIRES
2. NOT BEING ABLE TO STOP OR CONTROL WORRYING: NOT AT ALL
IF YOU CHECKED OFF ANY PROBLEMS ON THIS QUESTIONNAIRE, HOW DIFFICULT HAVE THESE PROBLEMS MADE IT FOR YOU TO DO YOUR WORK, TAKE CARE OF THINGS AT HOME, OR GET ALONG WITH OTHER PEOPLE: NOT DIFFICULT AT ALL
7. FEELING AFRAID AS IF SOMETHING AWFUL MIGHT HAPPEN: NOT AT ALL
8. IF YOU CHECKED OFF ANY PROBLEMS, HOW DIFFICULT HAVE THESE MADE IT FOR YOU TO DO YOUR WORK, TAKE CARE OF THINGS AT HOME, OR GET ALONG WITH OTHER PEOPLE?: NOT DIFFICULT AT ALL
GAD7 TOTAL SCORE: 0
5. BEING SO RESTLESS THAT IT IS HARD TO SIT STILL: NOT AT ALL
6. BECOMING EASILY ANNOYED OR IRRITABLE: NOT AT ALL
GAD7 TOTAL SCORE: 0
7. FEELING AFRAID AS IF SOMETHING AWFUL MIGHT HAPPEN: NOT AT ALL
3. WORRYING TOO MUCH ABOUT DIFFERENT THINGS: NOT AT ALL
1. FEELING NERVOUS, ANXIOUS, OR ON EDGE: NOT AT ALL
GAD7 TOTAL SCORE: 0
4. TROUBLE RELAXING: NOT AT ALL

## 2025-03-04 ASSESSMENT — PAIN SCALES - GENERAL: PAINLEVEL_OUTOF10: NO PAIN (0)

## 2025-03-04 NOTE — PROGRESS NOTES
Preventive Care Visit  [facility]  Joel Daniel Wegener, MD, Family Medicine  Mar 4, 2025      Assessment & Plan     Encounter for Medicare annual wellness exam      Colon Cancer Screening: Last done via colonoscopy on 3/7/23. (Impression: No specimens collected. Repeat 10 years). Due 3/2033.     -PSA: Last done 12/2022 (Result: 2.86). Discussed risks/benefits of PSA testing today in detail, including possibility of false positive results and/or possibility of biopsy recommended as next step. Patient requesting PSA lab work.     -Dermatology: Pt verbalized they do meet with dermatology regularly.      -Immunizations: Patient is due for the following vaccines: Tdap/Td and aRSV . Advised patient to receive at a pharmacy due to Medicare insurance coverage.       Muscle cramps  Gets often in hands, calves at night.   Check labs for underlying cause (hypercalcemia).      Would recommend daily magnesium gluconate 250mg/day.   - Calcium; Future  - Magnesium; Future  - TSH with free T4 reflex; Future  - Parathyroid Hormone Intact; Future  - Calcium  - Magnesium  - TSH with free T4 reflex  - Parathyroid Hormone Intact    Pain of right hand  Very stiff/locks at times.  Xrays to eval for level of arthritis.   - XR Hand Right G/E 3 Views; Future    Pain of left hand    - XR Hand Left G/E 3 Views; Future    Hypertension goal BP (blood pressure) < 130/80  At goal.  No changes.   - COMPREHENSIVE METABOLIC PANEL; Future  - COMPREHENSIVE METABOLIC PANEL    Hyperlipidemia LDL goal <130    - Lipid panel reflex to direct LDL Non-fasting; Future  - atorvastatin (LIPITOR) 40 MG tablet; Take 1 tablet (40 mg) by mouth daily.  - Lipid panel reflex to direct LDL Non-fasting    Screening for prostate cancer    - PROSTATE SPEC ANTIGEN SCREEN; Future  - PROSTATE SPEC ANTIGEN SCREEN    Patient has been advised of split billing requirements and indicates understanding: Yes    In addition to preventive health exam and  counseling 30 minutes spent on the date of the encounter doing chart review, history and exam, negotiating and explaining the plan with the patient, documentation and further activities as noted above.      The longitudinal plan of care for the diagnosis(es)/condition(s) as documented were addressed during this visit. Due to the added complexity in care, I will continue to support Nick in the subsequent management and with ongoing continuity of care.        Counseling  Appropriate preventive services were addressed with this patient via screening, questionnaire, or discussion as appropriate for fall prevention, nutrition, physical activity, Tobacco-use cessation, social engagement, weight loss and cognition.  Checklist reviewing preventive services available has been given to the patient.  Reviewed patient's diet, addressing concerns and/or questions.   He is at risk for lack of exercise and has been provided with information to increase physical activity for the benefit of his well-being.   He is at risk for psychosocial distress and has been provided with information to reduce risk.   Patient reported safety concerns were addressed today.Addressed any concerns about safety while driving.  The patient was provided with written information regarding signs of hearing loss.           Subjective   Nick is a 69 year old, presenting for the following:  Physical (AWV)        3/4/2025    11:55 AM   Additional Questions   Roomed by Ariadna ZAMAN           HPI    Wellness Visit Notes:     -Colon Cancer Screening: Last done via colonoscopy on 3/7/23. (Impression: No specimens collected. Repeat 10 years). Due 3/2033.   -PSA: Last done 12/2022 (Result: 2.86). Discussed risks/benefits of PSA testing today in detail, including possibility of false positive results and/or possibility of biopsy recommended as next step. Patient requesting PSA lab work.    -Dermatology: Pt verbalized they do meet with dermatology regularly.      -Immunizations: Patient is due for the following vaccines: Tdap/Td. Advised patient to receive at a pharmacy due to Medicare insurance coverage.              Advance Care Planning  Patient does not have a Health Care Directive: Discussed advance care planning with patient; information given to patient to review.      3/1/2025   General Health   How would you rate your overall physical health? Good   Feel stress (tense, anxious, or unable to sleep) To some extent   (!) STRESS CONCERN      3/1/2025   Nutrition   Diet: Regular (no restrictions)         3/1/2025   Exercise   Days per week of moderate/strenous exercise 1 day   Average minutes spent exercising at this level 30 min   (!) EXERCISE CONCERN      3/1/2025   Social Factors   Frequency of gathering with friends or relatives More than three times a week   Worry food won't last until get money to buy more No   Food not last or not have enough money for food? No   Do you have housing? (Housing is defined as stable permanent housing and does not include staying ouside in a car, in a tent, in an abandoned building, in an overnight shelter, or couch-surfing.) Yes   Are you worried about losing your housing? No   Lack of transportation? No   Unable to get utilities (heat,electricity)? No         3/1/2025   Fall Risk   Fallen 2 or more times in the past year? No   Trouble with walking or balance? No          3/1/2025   Activities of Daily Living- Home Safety   Needs help with the following daily activites None of the above   Safety concerns in the home No grab bars in the bathroom         3/1/2025   Dental   Dentist two times every year? Yes         3/1/2025   Hearing Screening   Hearing concerns? (!) I NEED TO ASK PEOPLE TO SPEAK UP OR REPEAT THEMSELVES.    (!) IT'S HARDER TO UNDERSTAND WOMEN'S VOICES THAN MEN'S VOICES.    (!) IT'S HARD TO FOLLOW A CONVERSATION IN A NOISY RESTAURANT OR CROWDED ROOM.    (!) TROUBLE UNDESTANDING A SPEAKER IN A PUBLIC MEETING OR  [employer] SERVICE.    (!) TROUBLE UNDERSTANDING SOFT OR WHISPERED SPEECH.    (!) TROUBLE UNDERSTANDING SPEECH ON THE TELEPHONE       Multiple values from one day are sorted in reverse-chronological order         3/1/2025   Driving Risk Screening   Patient/family members have concerns about driving (!) YES with cramping         3/1/2025   General Alertness/Fatigue Screening   Have you been more tired than usual lately? No         3/1/2025   Urinary Incontinence Screening   Bothered by leaking urine in past 6 months No         2/26/2024   TB Screening   Were you born outside of the [place]? No       Today's PHQ-9 Score:       3/4/2025    11:51 AM   PHQ-9 SCORE   PHQ-9 Total Score MyChart 1 (Minimal depression)   PHQ-9 Total Score 1        Patient-reported         3/1/2025   Substance Use   Alcohol more than 3/day or more than 7/wk Not Applicable   Do you have a current opioid prescription? No   How severe/bad is pain from 1 to 10? 1/10   Do you use any other substances recreationally? No     Social History     Tobacco Use    Smoking status: Never    Smokeless tobacco: Never   Vaping Use    Vaping status: Never Used   Substance Use Topics    Alcohol use: No    Drug use: No           3/1/2025   AAA Screening   Family history of Abdominal Aortic Aneurysm (AAA)? No   ASCVD Risk   The 10-year ASCVD risk score (Ezio BURNS, et al., 2019) is: 17.8%    Values used to calculate the score:      Age: 69 years      Sex: Male      Is Non- : No      Diabetic: No      Tobacco smoker: No      Systolic Blood Pressure: 139 mmHg      Is BP treated: No      HDL Cholesterol: 68 mg/dL      Total Cholesterol: 165 mg/dL            Reviewed and updated as needed this visit by Provider                    Past Medical History:   Diagnosis Date    Anxiety     Depressive disorder     Prostate infection      Past Surgical History:   Procedure Laterality Date    COLONOSCOPY  2017    COLONOSCOPY N/A 3/7/2023    Procedure:  "COLONOSCOPY;  Surgeon: Bozena Franco MD;  Location: [facility] [place] OR    EYE SURGERY  11/21    LAPAROSCOPIC APPENDECTOMY  01/21/2012    Procedure:LAPAROSCOPIC APPENDECTOMY; Open Appendectomy; Surgeon:GERDA GASTON; Location: OR     Current providers sharing in care for this patient include:  Patient Care Team:  Wegener, Joel Daniel Irwin, MD as PCP - General (Family Medicine)  ABHAY Soto MD as MD (Dermatology)  Wegener, Joel Daniel Irwin, MD as Assigned PCP  Ana Paula Hernandez AuD as Audiologist (Audiology)  Ladonna Mcqueen MD as MD (Otolaryngology)  Julieta Govea MD as Assigned Surgical Provider    The following health maintenance items are reviewed in Epic and correct as of today:  Health Maintenance   Topic Date Due    PSA  12/30/2023    DTAP/TDAP/TD IMMUNIZATION (2 - Td or Tdap) 04/28/2024    BMP  02/27/2025    CMP  02/27/2025    LIPID  02/27/2025    COVID-19 Vaccine (8 - 2024-25 season) 07/11/2025    MEDICARE ANNUAL WELLNESS VISIT  03/04/2026    ANNUAL REVIEW OF HM ORDERS  03/04/2026    FALL RISK ASSESSMENT  03/04/2026    PHQ-9  03/04/2026    GLUCOSE  02/27/2027    ADVANCE CARE PLANNING  03/04/2030    COLORECTAL CANCER SCREENING  03/07/2033    HEPATITIS C SCREENING  Completed    DEPRESSION ACTION PLAN  Completed    INFLUENZA VACCINE  Completed    Pneumococcal Vaccine: 50+ Years  Completed    ZOSTER IMMUNIZATION  Completed    RSV VACCINE  Completed    HPV IMMUNIZATION  Aged Out    MENINGITIS IMMUNIZATION  Aged Out         Review of Systems  Constitutional, neuro, ENT, endocrine, pulmonary, cardiac, gastrointestinal, genitourinary, musculoskeletal, integument and psychiatric systems are negative, except as otherwise noted.     Objective    Exam  /75   Pulse 73   Temp 98.4  F (36.9  C) (Temporal)   Resp 18   Ht 1.727 m (5' 8\")   Wt 55.2 kg (121 lb 11.2 oz)   SpO2 99%   BMI 18.50 kg/m     Estimated body mass index is 18.5 kg/m  as calculated from the following:    " "Height as of this encounter: 1.727 m (5' 8\").    Weight as of this encounter: 55.2 kg (121 lb 11.2 oz).    Physical Exam  GENERAL: alert and no distress  EYES: Eyes grossly normal to inspection, PERRL and conjunctivae and sclerae normal  HENT: ear canals and TM's normal, nose and mouth without ulcers or lesions  NECK: no adenopathy, no asymmetry, masses, or scars  RESP: lungs clear to auscultation - no rales, rhonchi or wheezes  CV: regular rate and rhythm, normal S1 S2, no S3 or S4, no murmur, click or rub, no peripheral edema  ABDOMEN: soft, nontender, no hepatosplenomegaly, no masses and bowel sounds normal  MS: no gross musculoskeletal defects noted, no edema  SKIN: no suspicious lesions or rashes  NEURO: Normal strength and tone, mentation intact and speech normal  PSYCH: mentation appears normal, affect normal/bright         3/4/2025   Mini Cog   Clock Draw Score 2 Normal   3 Item Recall 3 objects recalled   Mini Cog Total Score 5              Signed Electronically by: Joel Daniel Wegener, MD    Answers submitted by the patient for this visit:  Patient Health Questionnaire (Submitted on 3/4/2025)  If you checked off any problems, how difficult have these problems made it for you to do your work, take care of things at home, or get along with other people?: Not difficult at all  PHQ9 TOTAL SCORE: 1  Patient Health Questionnaire (G7) (Submitted on 3/4/2025)  EMBER 7 TOTAL SCORE: 0    "

## 2025-03-04 NOTE — PATIENT INSTRUCTIONS
Colon Cancer Screening: Last done via colonoscopy on 3/7/23. (Impression: No specimens collected. Repeat 10 years). Due 3/2033.     -PSA: Last done 12/2022 (Result: 2.86). Discussed risks/benefits of PSA testing today in detail, including possibility of false positive results and/or possibility of biopsy recommended as next step. Patient requesting PSA lab work.     -Dermatology: Pt verbalized they do meet with dermatology regularly.      -Immunizations: Patient is due for the following vaccines: Tdap/Td and aRSV . Advised patient to receive at a pharmacy due to Medicare insurance coverage.           Patient Education   Preventive Care Advice   This is general advice given by our system to help you stay healthy. However, your care team may have specific advice just for you. Please talk to your care team about your preventive care needs.  Nutrition  Eat 5 or more servings of fruits and vegetables each day.  Try wheat bread, brown rice and whole grain pasta (instead of white bread, rice, and pasta).  Get enough calcium and vitamin D. Check the label on foods and aim for 100% of the RDA (recommended daily allowance).  Lifestyle  Exercise at least 150 minutes each week  (30 minutes a day, 5 days a week).  Do muscle strengthening activities 2 days a week. These help control your weight and prevent disease.  No smoking.  Wear sunscreen to prevent skin cancer.  Have a dental exam and cleaning every 6 months.  Yearly exams  See your health care team every year to talk about:  Any changes in your health.  Any medicines your care team has prescribed.  Preventive care, family planning, and ways to prevent chronic diseases.  Shots (vaccines)   HPV shots (up to age 26), if you've never had them before.  Hepatitis B shots (up to age 59), if you've never had them before.  COVID-19 shot: Get this shot when it's due.  Flu shot: Get a flu shot every year.  Tetanus shot: Get a tetanus shot every 10 years.  Pneumococcal, hepatitis  A, and RSV shots: Ask your care team if you need these based on your risk.  Shingles shot (for age 50 and up)  General health tests  Diabetes screening:  Starting at age 35, Get screened for diabetes at least every 3 years.  If you are younger than age 35, ask your care team if you should be screened for diabetes.  Cholesterol test: At age 39, start having a cholesterol test every 5 years, or more often if advised.  Bone density scan (DEXA): At age 50, ask your care team if you should have this scan for osteoporosis (brittle bones).  Hepatitis C: Get tested at least once in your life.  STIs (sexually transmitted infections)  Before age 24: Ask your care team if you should be screened for STIs.  After age 24: Get screened for STIs if you're at risk. You are at risk for STIs (including HIV) if:  You are sexually active with more than one person.  You don't use condoms every time.  You or a partner was diagnosed with a sexually transmitted infection.  If you are at risk for HIV, ask about PrEP medicine to prevent HIV.  Get tested for HIV at least once in your life, whether you are at risk for HIV or not.  Cancer screening tests  Cervical cancer screening: If you have a cervix, begin getting regular cervical cancer screening tests starting at age 21.  Breast cancer scan (mammogram): If you've ever had breasts, begin having regular mammograms starting at age 40. This is a scan to check for breast cancer.  Colon cancer screening: It is important to start screening for colon cancer at age 45.  Have a colonoscopy test every 10 years (or more often if you're at risk) Or, ask your provider about stool tests like a FIT test every year or Cologuard test every 3 years.  To learn more about your testing options, visit:   .  For help making a decision, visit:   https://bit.ly/bp04198.  Prostate cancer screening test: If you have a prostate, ask your care team if a prostate cancer screening test (PSA) at age 55 is right for  you.  Lung cancer screening: If you are a current or former smoker ages 50 to 80, ask your care team if ongoing lung cancer screenings are right for you.  For informational purposes only. Not to replace the advice of your health care provider. Copyright   2023 [facility]. All rights reserved. Clinically reviewed by the [facility] Transitions Program. [employer] 829130 - REV 01/24.  Learning About Activities of Daily Living  What are activities of daily living?     Activities of daily living (ADLs) are the basic self-care tasks you do every day. These include eating, bathing, dressing, and moving around.  As you age, and if you have health problems, you may find that it's harder to do some of these tasks. If so, your doctor can suggest ideas that may help.  To measure what kind of help you may need, your doctor will ask how well you are able to do ADLs. Let your doctor know if there are any tasks that you are having trouble doing. This is an important first step to getting help. And when you have the help you need, you can stay as independent as possible.  How will a doctor assess your ADLs?  Asking about ADLs is part of a routine health checkup your doctor will likely do as you age. Your health check might be done in a doctor's office, in your home, or at a hospital. The goal is to find out if you are having any problems that could make it hard to care for yourself or that make it unsafe for you to be on your own.  To measure your ADLs, your doctor will ask how hard it is for you to do routine tasks. Your doctor may also want to know if you have changed the way you do a task because of a health problem. Your doctor may watch how you:  Walk back and forth.  Keep your balance while you stand or walk.  Move from sitting to standing or from a bed to a chair.  Button or unbutton a shirt or sweater.  Remove and put on your shoes.  It's common to feel a little worried or anxious if you find you can't  do all the things you used to be able to do. Talking with your doctor about ADLs is a way to make sure you're as safe as possible and able to care for yourself as well as you can. You may want to bring a caregiver, friend, or family member to your checkup. They can help you talk to your doctor.  Follow-up care is a key part of your treatment and safety. Be sure to make and go to all appointments, and call your doctor if you are having problems. It's also a good idea to know your test results and keep a list of the medicines you take.  Current as of: October 24, 2023  Content Version: 14.3    2024 [employer].   Care instructions adapted under license by your healthcare professional. If you have questions about a medical condition or this instruction, always ask your healthcare professional. [employer] disclaims any warranty or liability for your use of this information.    Hearing Loss: Care Instructions  Overview     Hearing loss is a sudden or slow decrease in how well you hear. It can range from slight to profound. Permanent hearing loss can occur with aging. It also can happen when you are exposed long-term to loud noise. Examples include listening to loud music, riding motorcycles, or being around other loud machines.  Hearing loss can affect your work and home life. It can make you feel lonely or depressed. You may feel that you have lost your independence. But hearing aids and other devices can help you hear better and feel connected to others.  Follow-up care is a key part of your treatment and safety. Be sure to make and go to all appointments, and call your doctor if you are having problems. It's also a good idea to know your test results and keep a list of the medicines you take.  How can you care for yourself at home?  Avoid loud noises whenever possible. This helps keep your hearing from getting worse.  Always wear hearing protection around loud noises.  Wear a hearing aid as  "directed.  A professional can help you pick a hearing aid that will work best for you.  You can also get hearing aids over the counter for mild to moderate hearing loss.  Have hearing tests as your doctor suggests. They can show whether your hearing has changed. Your hearing aid may need to be adjusted.  Use other devices as needed. These may include:  Telephone amplifiers and hearing aids that can connect to a television, stereo, radio, or microphone.  Devices that use lights or vibrations. These alert you to the doorbell, a ringing telephone, or a baby monitor.  Television closed-captioning. This shows the words at the bottom of the screen. Most new TVs can do this.  TTY (text telephone). This lets you type messages back and forth on the telephone instead of talking or listening. These devices are also called TDD. When messages are typed on the keyboard, they are sent over the phone line to a receiving TTY. The message is shown on a monitor.  Use text messaging, social media, and email if it is hard for you to communicate by telephone.  Try to learn a listening technique called speechreading. It is not lipreading. You pay attention to people's gestures, expressions, posture, and tone of voice. These clues can help you understand what a person is saying. Face the person you are talking to, and have them face you. Make sure the lighting is good. You need to see the other person's face clearly.  Think about counseling if you need help to adjust to your hearing loss.  When should you call for help?  Watch closely for changes in your health, and be sure to contact your doctor if:    You think your hearing is getting worse.     You have new symptoms, such as dizziness or nausea.   Where can you learn more?  Go to https://www.[employer].net/patiented  Enter R798 in the search box to learn more about \"Hearing Loss: Care Instructions.\"  Current as of: September 27, 2023  Content Version: 14.3    2024 [employer].   Care instructions adapted under license by your healthcare professional. If you have questions about a medical condition or this instruction, always ask your healthcare professional. [employer] disclaims any warranty or liability for your use of this information.    Learning About Stress  What is stress?     Stress is your body's response to a hard situation. Your body can have a physical, emotional, or mental response. Stress is a fact of life for most people, and it affects everyone differently. What causes stress for you may not be stressful for someone else.  A lot of things can cause stress. You may feel stress when you go on a job interview, take a test, or run a race. This kind of short-term stress is normal and even useful. It can help you if you need to work hard or react quickly. For example, stress can help you finish an important job on time.  Long-term stress is caused by ongoing stressful situations or events. Examples of long-term stress include long-term health problems, ongoing problems at work, or conflicts in your family. Long-term stress can harm your health.  How does stress affect your health?  When you are stressed, your body responds as though you are in danger. It makes hormones that speed up your heart, make you breathe faster, and give you a burst of energy. This is called the fight-or-flight stress response. If the stress is over quickly, your body goes back to normal and no harm is done.  But if stress happens too often or lasts too long, it can have bad effects. Long-term stress can make you more likely to get sick, and it can make symptoms of some diseases worse. If you tense up when you are stressed, you may develop neck, shoulder, or low back pain. Stress is linked to high blood pressure and heart disease.  Stress also harms your emotional health. It can make you morales, tense, or depressed. Your relationships may suffer, and you may not do well at work or  school.  What can you do to manage stress?  You can try these things to help manage stress:   Do something active. Exercise or activity can help reduce stress. Walking is a great way to get started. Even everyday activities such as housecleaning or yard work can help.  Try yoga or roshni chi. These techniques combine exercise and meditation. You may need some training at first to learn them.  Do something you enjoy. For example, listen to music or go to a movie. Practice your hobby or do volunteer work.  Meditate. This can help you relax, because you are not worrying about what happened before or what may happen in the future.  Do guided imagery. Imagine yourself in any setting that helps you feel calm. You can use online videos, books, or a teacher to guide you.  Do breathing exercises. For example:  From a standing position, bend forward from the waist with your knees slightly bent. Let your arms dangle close to the floor.  Breathe in slowly and deeply as you return to a standing position. Roll up slowly and lift your head last.  Hold your breath for just a few seconds in the standing position.  Breathe out slowly and bend forward from the waist.  Let your feelings out. Talk, laugh, cry, and express anger when you need to. Talking with supportive friends or family, a counselor, or a jodee leader about your feelings is a healthy way to relieve stress. Avoid discussing your feelings with people who make you feel worse.  Write. It may help to write about things that are bothering you. This helps you find out how much stress you feel and what is causing it. When you know this, you can find better ways to cope.  What can you do to prevent stress?  You might try some of these things to help prevent stress:  Manage your time. This helps you find time to do the things you want and need to do.  Get enough sleep. Your body recovers from the stresses of the day while you are sleeping.  Get support. Your family, friends, and  "community can make a difference in how you experience stress.  Limit your news feed. Avoid or limit time on social media or news that may make you feel stressed.  Do something active. Exercise or activity can help reduce stress. Walking is a great way to get started.  Where can you learn more?  Go to https://www.[employer].net/patiented  Enter N032 in the search box to learn more about \"Learning About Stress.\"  Current as of: October 24, 2023  Content Version: 14.3    2024 [employer].   Care instructions adapted under license by your healthcare professional. If you have questions about a medical condition or this instruction, always ask your healthcare professional. [employer] disclaims any warranty or liability for your use of this information.       "

## 2025-03-10 NOTE — PROGRESS NOTES
[facility] Dermatology Note  Encounter Date: Mar 14, 2025  Office Visit     Dermatology Problem List:  Last skin check 03/14/2025    # NUB, R lateral antecubital fossa   # NUB, R lateral neck  - s/p shave bx 03/14/2025    # NMSC  - n & micronod BCC, right forearm, s/p shave 4/19/24, s/p exc 6/20/24  # Actinic cheilitis, good response to efudex 4/19/24  # Folliculitis, R forearm, adjacent to excision site  - Mupirocin ointment   #. AKs. LiqN2.   #  Acne/folliculitis with acne excorie/prurigo nodularis/atopic dermatitis.   - current tx: BP 4-5% wash, clindamycin 1% lotion, clindamycin 1% solution, tacrolimus 0.1%   - previous tx:doxycycline 100 mg PO BID/qdaily x 6 months (improvement),  bleach baths, hydrocortisone 0.2% cream  # Seborrheic dermatitis.   - ketoconazole 2% shampoo three times per week  - Synalar solution at bedtime   # Atopic dermatitis   - current tx: TMC 0.025% cream    # LTM  - L antecubital fossa 1.25 mm brown macule   ____________________________________________    Assessment & Plan:    # NUB, R lateral antecubital fossa   - Shave biopsy performed today. See procedure note below.     # NUB, R lateral neck  - Shave biopsy performed today. See procedure note below.     # LTM  - L antecubital fossa 1.25 mm brown macule     # Lesion to Monitor,  left lateral shin: 4 -5 mm pink macule dermoscopy with hemorrhage. Resolved today.     # Aks, L cheek x2, dorsal hands  - Cryotherapy done today. See procedure note below.     # Eczematous dermatitis - had been using westcort with good results but not covered by insurance, will provide alternate similar potency  - Advised daily moisturization with bland emollient.   - start TMC 0.025% cream BID prn     # Benign lesions - SKs, cherry angiomas, lentigenes.  - No treatment required    # Multiple benign nevi   - Monitor for ABCDEs of melanoma   - Continue sun protection - recommend SPF 30 or higher with frequent application   - Return sooner if  noticing changing or symptomatic lesions     # History of NMSC. No evidence of recurrent disease.  - Continue photoprotection - recommend SPF 30 or higher with frequent reapplication  - Continue yearly skin exams  - Advised to monitor for changing, non-healing, bleeding, painful, changing, or otherwise symptomatic lesions    Procedures Performed:   Cryotherapy procedure note: After verbal consent and discussion of risks and benefits including but no limited to dyspigmentation/scar, blister, and pain, 6 Aks was(were) treated with 1-2mm freeze border for 2 cycles with liquid nitrogen. Post cryotherapy instructions were provided.     - Shave biopsy x2: After discussion of benefits and risks including but not limited to bleeding, infection, scar, incomplete removal, recurrence, and non-diagnostic biopsy, written consent and photographs were obtained. The area was cleaned with isopropyl alcohol. < 1mL of 1% lidocaine with epinephrine was injected to obtain adequate anesthesia. A shave biopsy was performed. Hemostasis was achieved with aluminium chloride. Vaseline and a sterile dressing were applied. The patient tolerated the procedure and no complications were noted. The patient was provided with verbal and written post care instructions.       Follow-up: 6-12 month(s) in-person for skin check, pending pathology, or earlier for new or changing lesions    Staff and Scribe:     Scribe Disclosure:   I, Niki Rowe, am serving as a scribe; to document services personally performed by Julieta Govea MD -based on data collection and the provider's statements to me.     Provider Disclosure:   The documentation recorded by the scribe accurately reflects the services I personally performed and the decisions made by me.    Julieta Govea MD    Department of Dermatology  [facility]: Phone: [phone], Fax:  [phone]  3/18/2025         ____________________________________________    CC: Skin Check (Here today for a skin check. Eczema concerns.)    HPI:  Mr. Marvin Elaine is a(n) 69 year old male who presents today as a return patient for recheck.    The patient reports that he has some eczema concerns today. He has been using hydrocortisone. Mupirocin helped his folliculitis symptoms.      Patient is otherwise feeling well, without additional skin concerns.    Labs Reviewed:  N/A    Physical exam:  Vitals: There were no vitals taken for this visit.  GEN: This is a well developed, well-nourished male in no acute distress, in a pleasant mood.    SKIN: Total skin excluding the undergarment areas was performed. The exam included the head/face, neck, both arms, chest, back, abdomen, both legs, digits and/or nails.   - previous lesion on left lateral shin is resolved today  - L antecubital fossa 1.25 mm brown macule   - R lateral antecubital fossa, pink scaly tender macule   - R lateral neck, dark brown macule   - There are dome shaped bright red papules on the trunk and extremities .   - Multiple regular brown pigmented macules and papules are identified on the trunk and extremities. .   - Scattered brown macules on sun exposed areas.  - Waxy stuck on papules and plaques on trunk and extremities.   - There are erythematous macules with overyling adherent scale on the L cheek x2, dorsal hands.   - No other lesions of concern on areas examined.     Medications:  Current Outpatient Medications   Medication Sig Dispense Refill    atorvastatin (LIPITOR) 40 MG tablet Take 1 tablet (40 mg) by mouth daily. 90 tablet 3    clindamycin (CLEOCIN T) 1 % external lotion Apply once daily after showering. 60 mL 11    clindamycin 1 % EX external solution Apply 10-15 drops once daily to the scalp. 60 mL 1    fluocinonide (LIDEX) 0.05 % external ointment Apply twice daily as needed for itchy spots. Please keep f/u appt. 30 g 5     fluocinonide (LIDEX) 0.05 % external solution Apply 10-15 drops to scalp at nighttime before bed as needed for itch/scale. 60 mL 11    fluorouracil (EFUDEX) 5 % external cream Apply topically 2 times daily To lower lip for 1-2 weeks as tolerated, stop when significant irritation occurs 40 g 1    hydrocortisone valerate (WEST-LAM) 0.2 % external ointment Apply topically 2 times daily. To rash as needed 60 g 3    ketoconazole (NIZORAL) 2 % external shampoo Apply topically three times a week. LATHER ONTO FOREHEAD AND SCALP AND LET SIT FOR 1 TO 2 MINUTES BEFORE RINSING. USE AT LEAST 2 TO 3 TIMES WEEKLY IN THE SHOWER. 120 mL 11    methylphenidate (RITALIN) 10 MG tablet Take 10 mg by mouth daily as needed      mupirocin (BACTROBAN) 2 % external ointment Use 2 times a day to affected area when pimple like lesion develops 30 g 1    PARoxetine (PAXIL) 40 MG tablet Take 1 tablet (40 mg) by mouth every morning       No current facility-administered medications for this visit.      Past Medical History:   Patient Active Problem List   Diagnosis    Anxiety    Persistent depressive disorder    CARDIOVASCULAR SCREENING; LDL GOAL LESS THAN 160    Chronic pelvic pain in male    Osteochondritis    Chronic midline low back pain without sciatica    Encounter for general adult medical examination with abnormal findings    Hyperlipidemia LDL goal <130    Acute bilateral low back pain without sciatica     Past Medical History:   Diagnosis Date    Anxiety     Depressive disorder     Prostate infection         CC Referred Self, MD  No address on file on close of this encounter.

## 2025-03-14 ENCOUNTER — OFFICE VISIT (OUTPATIENT)
Dept: DERMATOLOGY | Facility: CLINIC | Age: 70
End: 2025-03-14
Payer: COMMERCIAL

## 2025-03-14 DIAGNOSIS — L20.9 ATOPIC DERMATITIS, UNSPECIFIED TYPE: ICD-10-CM

## 2025-03-14 DIAGNOSIS — D22.9 MULTIPLE BENIGN NEVI: ICD-10-CM

## 2025-03-14 DIAGNOSIS — L57.0 ACTINIC KERATOSES: ICD-10-CM

## 2025-03-14 DIAGNOSIS — L82.1 SEBORRHEIC KERATOSES: ICD-10-CM

## 2025-03-14 DIAGNOSIS — D49.2 NEOPLASM OF UNSPECIFIED BEHAVIOR OF BONE, SOFT TISSUE, AND SKIN: ICD-10-CM

## 2025-03-14 DIAGNOSIS — Z12.83 SKIN CANCER SCREENING: Primary | ICD-10-CM

## 2025-03-14 DIAGNOSIS — Z85.828 HISTORY OF NONMELANOMA SKIN CANCER: ICD-10-CM

## 2025-03-14 DIAGNOSIS — D18.01 CHERRY ANGIOMA: ICD-10-CM

## 2025-03-14 DIAGNOSIS — L81.4 SOLAR LENTIGO: ICD-10-CM

## 2025-03-14 PROCEDURE — 88305 TISSUE EXAM BY PATHOLOGIST: CPT | Mod: 26 | Performed by: PATHOLOGY

## 2025-03-14 PROCEDURE — 88342 IMHCHEM/IMCYTCHM 1ST ANTB: CPT | Mod: 26 | Performed by: PATHOLOGY

## 2025-03-14 PROCEDURE — 88342 IMHCHEM/IMCYTCHM 1ST ANTB: CPT | Mod: TC | Performed by: DERMATOLOGY

## 2025-03-14 RX ORDER — TRIAMCINOLONE ACETONIDE 0.25 MG/G
CREAM TOPICAL 2 TIMES DAILY
Qty: 80 G | Refills: 3 | Status: SHIPPED | OUTPATIENT
Start: 2025-03-14

## 2025-03-14 ASSESSMENT — PAIN SCALES - GENERAL: PAINLEVEL_OUTOF10: NO PAIN (0)

## 2025-03-14 NOTE — NURSING NOTE
Dermatology Rooming Note    Marvin Elaine's goals for this visit include:   Chief Complaint   Patient presents with    Skin Check     Here today for a skin check. Eczema concerns.     Chichi Farris RN

## 2025-03-14 NOTE — LETTER
3/14/2025       RE: Marvin Elaine  [address]  [facility] [place] [address]     Dear Colleague,    Thank you for referring your patient, Marvin Elaine, to the [facility] DERMATOLOGY [facility]INIC [place] at [facility]. Please see a copy of my visit note below.    [facility] Dermatology Note  Encounter Date: Mar 14, 2025  Office Visit     Dermatology Problem List:  Last skin check 03/14/2025    # NUB, R lateral antecubital fossa   # NUB, R lateral neck  - s/p shave bx 03/14/2025    # NMSC  - n & micronod BCC, right forearm, s/p shave 4/19/24, s/p exc 6/20/24  # Actinic cheilitis, good response to efudex 4/19/24  # Folliculitis, R forearm, adjacent to excision site  - Mupirocin ointment   #. AKs. LiqN2.   #  Acne/folliculitis with acne excorie/prurigo nodularis/atopic dermatitis.   - current tx: BP 4-5% wash, clindamycin 1% lotion, clindamycin 1% solution, tacrolimus 0.1%   - previous tx:doxycycline 100 mg PO BID/qdaily x 6 months (improvement),  bleach baths, hydrocortisone 0.2% cream  # Seborrheic dermatitis.   - ketoconazole 2% shampoo three times per week  - Synalar solution at bedtime   # Atopic dermatitis   - current tx: TMC 0.025% cream    # LTM  - L antecubital fossa 1.25 mm brown macule   ____________________________________________    Assessment & Plan:    # NUB, R lateral antecubital fossa   - Shave biopsy performed today. See procedure note below.     # NUB, R lateral neck  - Shave biopsy performed today. See procedure note below.     # LTM  - L antecubital fossa 1.25 mm brown macule     # Lesion to Monitor,  left lateral shin: 4 -5 mm pink macule dermoscopy with hemorrhage. Resolved today.     # Aks, L cheek x2, dorsal hands  - Cryotherapy done today. See procedure note below.     # Eczematous dermatitis - had been using westcort with good results but not covered by insurance, will provide alternate similar potency  - Advised  daily moisturization with bland emollient.   - start TMC 0.025% cream BID prn     # Benign lesions - SKs, cherry angiomas, lentigenes.  - No treatment required    # Multiple benign nevi   - Monitor for ABCDEs of melanoma   - Continue sun protection - recommend SPF 30 or higher with frequent application   - Return sooner if noticing changing or symptomatic lesions     # History of NMSC. No evidence of recurrent disease.  - Continue photoprotection - recommend SPF 30 or higher with frequent reapplication  - Continue yearly skin exams  - Advised to monitor for changing, non-healing, bleeding, painful, changing, or otherwise symptomatic lesions    Procedures Performed:   Cryotherapy procedure note: After verbal consent and discussion of risks and benefits including but no limited to dyspigmentation/scar, blister, and pain, 6 Aks was(were) treated with 1-2mm freeze border for 2 cycles with liquid nitrogen. Post cryotherapy instructions were provided.     - Shave biopsy x2: After discussion of benefits and risks including but not limited to bleeding, infection, scar, incomplete removal, recurrence, and non-diagnostic biopsy, written consent and photographs were obtained. The area was cleaned with isopropyl alcohol. < 1mL of 1% lidocaine with epinephrine was injected to obtain adequate anesthesia. A shave biopsy was performed. Hemostasis was achieved with aluminium chloride. Vaseline and a sterile dressing were applied. The patient tolerated the procedure and no complications were noted. The patient was provided with verbal and written post care instructions.       Follow-up: 6-12 month(s) in-person for skin check, pending pathology, or earlier for new or changing lesions    Staff and Scribe:     Scribe Disclosure:   Niki SLAUGHTER, am serving as a scribe; to document services personally performed by Julieta Govea MD -based on data collection and the provider's statements to me.     Provider Disclosure:   The  documentation recorded by the scribe accurately reflects the services I personally performed and the decisions made by me.    Julieta Govea MD    Department of Dermatology  [facility]: Phone: [phone], Fax: [phone]  3/18/2025         ____________________________________________    CC: Skin Check (Here today for a skin check. Eczema concerns.)    HPI:  Mr. Marvin Elaine is a(n) 69 year old male who presents today as a return patient for recheck.    The patient reports that he has some eczema concerns today. He has been using hydrocortisone. Mupirocin helped his folliculitis symptoms.      Patient is otherwise feeling well, without additional skin concerns.    Labs Reviewed:  N/A    Physical exam:  Vitals: There were no vitals taken for this visit.  GEN: This is a well developed, well-nourished male in no acute distress, in a pleasant mood.    SKIN: Total skin excluding the undergarment areas was performed. The exam included the head/face, neck, both arms, chest, back, abdomen, both legs, digits and/or nails.   - previous lesion on left lateral shin is resolved today  - L antecubital fossa 1.25 mm brown macule   - R lateral antecubital fossa, pink scaly tender macule   - R lateral neck, dark brown macule   - There are dome shaped bright red papules on the trunk and extremities .   - Multiple regular brown pigmented macules and papules are identified on the trunk and extremities. .   - Scattered brown macules on sun exposed areas.  - Waxy stuck on papules and plaques on trunk and extremities.   - There are erythematous macules with overyling adherent scale on the L cheek x2, dorsal hands.   - No other lesions of concern on areas examined.     Medications:  Current Outpatient Medications   Medication Sig Dispense Refill     atorvastatin (LIPITOR) 40 MG tablet Take 1 tablet (40 mg) by mouth daily. 90 tablet 3      clindamycin (CLEOCIN T) 1 % external lotion Apply once daily after showering. 60 mL 11     clindamycin 1 % EX external solution Apply 10-15 drops once daily to the scalp. 60 mL 1     fluocinonide (LIDEX) 0.05 % external ointment Apply twice daily as needed for itchy spots. Please keep f/u appt. 30 g 5     fluocinonide (LIDEX) 0.05 % external solution Apply 10-15 drops to scalp at nighttime before bed as needed for itch/scale. 60 mL 11     fluorouracil (EFUDEX) 5 % external cream Apply topically 2 times daily To lower lip for 1-2 weeks as tolerated, stop when significant irritation occurs 40 g 1     hydrocortisone valerate (WEST-LAM) 0.2 % external ointment Apply topically 2 times daily. To rash as needed 60 g 3     ketoconazole (NIZORAL) 2 % external shampoo Apply topically three times a week. LATHER ONTO FOREHEAD AND SCALP AND LET SIT FOR 1 TO 2 MINUTES BEFORE RINSING. USE AT LEAST 2 TO 3 TIMES WEEKLY IN THE SHOWER. 120 mL 11     methylphenidate (RITALIN) 10 MG tablet Take 10 mg by mouth daily as needed       mupirocin (BACTROBAN) 2 % external ointment Use 2 times a day to affected area when pimple like lesion develops 30 g 1     PARoxetine (PAXIL) 40 MG tablet Take 1 tablet (40 mg) by mouth every morning       No current facility-administered medications for this visit.      Past Medical History:   Patient Active Problem List   Diagnosis     Anxiety     Persistent depressive disorder     CARDIOVASCULAR SCREENING; LDL GOAL LESS THAN 160     Chronic pelvic pain in male     Osteochondritis     Chronic midline low back pain without sciatica     Encounter for general adult medical examination with abnormal findings     Hyperlipidemia LDL goal <130     Acute bilateral low back pain without sciatica     Past Medical History:   Diagnosis Date     Anxiety      Depressive disorder      Prostate infection         CC Referred Self, MD  No address on file on close of this encounter.      Lidocaine-epinephrine 1-1:913155 %  injection   0.9mL once for one use, starting 3/14/2025 ending 3/14/2025,  2mL disp, R-0, injection  Injected by ANTHONY Torres        Again, thank you for allowing me to participate in the care of your patient.      Sincerely,    Julieta Govea MD

## 2025-03-14 NOTE — PATIENT INSTRUCTIONS
Checking for Skin Cancer  You can help find cancer early by checking your skin each month. There are 3 main kinds of skin cancer: melanoma, basal cell carcinoma, and squamous cell carcinoma. Doing monthly skin checks is the best way to find new marks, sores, or skin changes. Follow these instructions for checking your skin.   The ABCDEs of checking moles for melanoma   Check your moles or growths for signs of melanoma using ABCDE:   Asymmetry: The sides of the mole or growth don t match.  Border: The edges are ragged, notched, or blurred.  Color: The color within the mole or growth varies. It could be black, brown, tan, white, or shades of red, gray, or blue.  Diameter: The mole or growth is larger than   inch or 6 mm (size of a pencil eraser).  Evolving: The size, shape, texture, or color of the mole or growth is changing.     ABCDE's of moles on light skin.        ABCDE's of moles on dark skin may be harder to identify.     Checking for other types of skin cancer  Basal cell carcinoma or squamous cell carcinoma cause symptoms like:     A spot or mole that looks different from all other marks on your skin  Changes in how an area feels, such as itching, tenderness, or pain  Changes in the skin's surface, such as oozing, bleeding, or scaliness  A sore that doesn't heal  New swelling, redness, or spread of color beyond the border of a mole    Who s at risk?  Anyone of any skin color can get skin cancer. But you're at greater risk if you have:   Fair skin that freckles easily and burns instead of tanning  Light-colored or red hair  Light-colored eyes  Many moles or abnormal moles on your skin  A long history of unprotected exposure to sunlight or tanning beds  A history of many blistering sunburns as a child or teen  A family history of skin cancer  Been exposed to radiation or chemicals  A weakened immune system  Been exposed to arsenic  If you've had skin cancer in the past, you're at high risk of having it again.    How to check your skin  Do your monthly skin checkups in front of a full-length mirror. Use a room with good lighting so it's easier to see. Use a hand mirror to look at hard-to-see places like your buttocks and back. You can also have a trusted friend or family member help you with these checks. Check every part of your body, including your:   Head (ears, face, neck, and scalp)  Torso (front, back, sides, and under breasts)  Arms (tops, undersides, and armpits)  Hands (palms, backs, and fingers, including under the nails)  Lower back, buttocks, and genitals  Legs (front, back, and sides)  Feet (tops, soles, toes, including under the nails, and between toes)  Watch for new spots on your skin or a spot that's changing in color, shape, size.   If you have a lot of moles, take digital photos of them each month. Make sure to take photos both up close and from a distance. These can help you see if any moles change over time.   Know your skin  Most skin changes aren't cancer. But if you see any changes in your skin, call your healthcare provider right away. Only they can tell you if a change is a problem. If you have skin cancer, seeing your provider can be the first step to getting the treatment that could save your life.   Bessie last reviewed this educational content on 10/1/2021    7541-4726 [employer]. All rights reserved. This information is not intended as a substitute for professional medical care. Always follow your healthcare professional's instructions.     Wound Care After a Biopsy    What is a skin biopsy?  A skin biopsy allows the doctor to examine a very small piece of tissue under the microscope to determine the diagnosis and the best treatment for the skin condition. A local anesthetic (numbing medicine) is injected with a very small needle into the skin area to be tested. A small piece of skin is taken from the area. Sometimes a suture (stitch) is used.     What are the risks of a skin  biopsy?  I will experience scar, bleeding, swelling, pain, crusting and redness. I may experience incomplete removal or recurrence. Risks of this procedure are excessive bleeding, bruising, infection, nerve damage, numbness, thick (hypertrophic or keloidal) scar and non-diagnostic biopsy.    How should I care for my wound for the first 24 hours?  Keep the wound dry and covered for 24 hours  If it bleeds, hold direct pressure on the area for 15 minutes. If bleeding does not stop, call us or go to the emergency room  Avoid strenuous exercise the first 1-2 days or as your doctor instructs you    How should I care for the wound after 24 hours?  After 24 hours, remove the bandage  You may bathe or shower as normal  If you had a scalp biopsy, you can shampoo as usual and can use shower water to clean the biopsy site daily  Clean the wound once a day with gentle soap and water  Do not scrub, be gentle  Apply white petroleum/Vaseline after cleaning the wound with a cotton swab or a clean finger, and keep the site covered with a Bandaid /bandage. Bandages are not necessary with a scalp biopsy  If you are unable to cover the site with a Bandaid /bandage, re-apply ointment 2-3 times a day to keep the site moist. Moisture will help with healing  Avoid strenuous activity for first 1-2 days  Avoid lakes, rivers, pools, and oceans until the stitches are removed or the site is healed    How do I clean my wound?  Wash hands thoroughly with soap or use hand  before all wound care  Clean the wound with gentle soap and water  Apply white petroleum/Vaseline  to wound after it is clean  Replace the Bandaid /bandage to keep the wound covered for the first few days or as instructed by your doctor  If you had a scalp biopsy, warm shower water to the area on a daily basis should suffice    What should I use to clean my wound?   Cotton-tipped applicators (Qtips )  White petroleum jelly (Vaseline ). Use a clean new container and use  Q-tips to apply.  Bandaids  as needed  Gentle soap     How should I care for my wound long term?  Do not get your wound dirty  Keep up with wound care for one week or until the area is healed.  A small scab will form and fall off by itself when the area is completely healed. The area will be red and will become pink in color as it heals. Sun protection is very important for how your scar will turn out. Sunscreen with an SPF 30 or greater is recommended once the area is healed.  You should have some soreness but it should be mild and slowly go away over several days. Talk to your doctor about using tylenol for pain,    When should I call my doctor?  If you have increased:   Pain or swelling  Pus or drainage (clear or slightly yellow drainage is ok)  Temperature over 100F  Spreading redness or warmth around wound    When will I hear about my results?  The biopsy results can take 2 weeks to come back.  Your results will automatically release to [employer] before your provider has even reviewed them.  The clinic will call you with the results, send you a [employer] message, or have you schedule a follow-up clinic or phone time to discuss the results.  Contact our clinics if you do not hear from us in 2 weeks.    Who should I call with questions?  [facility]: [phone]  [facility]: [phone]  For urgent needs outside of business hours call the [facility] at [phone] and ask for the dermatology resident on call   Cryotherapy    What is it?  Use of a very cold liquid, such as liquid nitrogen, to freeze and destroy abnormal skin cells that need to be removed    What should I expect?  Tenderness and redness  A small blister that might grow and fill with dark purple blood. There may be crusting.  More than one treatment may be needed if the lesions do not go away.    How do I care for the treated area?  Gently wash the area with your hands when  bathing.  Use a thin layer of Vaseline to help with healing. You may use a Band-Aid.   The area should heal within 7-10 days and may leave behind a pink or lighter color.   Do not use an antibiotic or Neosporin ointment.   You may take acetaminophen (Tylenol) for pain.     Call your doctor if you have:  Severe pain  Signs of infection (warmth, redness, cloudy yellow drainage, and or a bad smell)  Questions or concerns    Who should I call with questions?      [facility]: [phone]      Health system: [phone]      For urgent needs outside of business hours call the [facility] at [phone] and ask for the dermatology resident on call

## 2025-03-14 NOTE — PROGRESS NOTES
Lidocaine-epinephrine 1-1:764624 % injection   0.9mL once for one use, starting 3/14/2025 ending 3/14/2025,  2mL disp, R-0, injection  Injected by Maci Colon CA

## 2025-03-18 ENCOUNTER — ANCILLARY PROCEDURE (OUTPATIENT)
Dept: GENERAL RADIOLOGY | Facility: CLINIC | Age: 70
End: 2025-03-18
Attending: FAMILY MEDICINE
Payer: COMMERCIAL

## 2025-03-18 PROCEDURE — 73130 X-RAY EXAM OF HAND: CPT | Mod: TC | Performed by: RADIOLOGY

## 2025-03-19 ENCOUNTER — MYC MEDICAL ADVICE (OUTPATIENT)
Dept: DERMATOLOGY | Facility: CLINIC | Age: 70
End: 2025-03-19

## 2025-03-20 ENCOUNTER — TELEPHONE (OUTPATIENT)
Dept: DERMATOLOGY | Facility: CLINIC | Age: 70
End: 2025-03-20
Payer: COMMERCIAL

## 2025-03-20 DIAGNOSIS — C44.92 SCC (SQUAMOUS CELL CARCINOMA): Primary | ICD-10-CM

## 2025-03-26 ENCOUNTER — TELEPHONE (OUTPATIENT)
Dept: DERMATOLOGY | Facility: CLINIC | Age: 70
End: 2025-03-26
Payer: COMMERCIAL

## 2025-03-26 NOTE — TELEPHONE ENCOUNTER
Left patient a voicemail to schedule an excision for Excision SCCIS R lateral antecubital fossa with Dr. Cagle. Provided my direct phone number.    Mallika Dinhied on 3/26/2025 at 10:05 AM

## 2025-03-31 ENCOUNTER — MYC MEDICAL ADVICE (OUTPATIENT)
Dept: FAMILY MEDICINE | Facility: CLINIC | Age: 70
End: 2025-03-31
Payer: COMMERCIAL

## 2025-03-31 ENCOUNTER — MYC MEDICAL ADVICE (OUTPATIENT)
Dept: DERMATOLOGY | Facility: CLINIC | Age: 70
End: 2025-03-31
Payer: COMMERCIAL

## 2025-03-31 NOTE — TELEPHONE ENCOUNTER
This was a low risk squamous cell skin cancer so I think enjoy your trip and treat it when you return :)  We are often scheduling these out 6-8 weeks sometimes even longer  If you notice it growing rapidly or changing though please do let us know!

## 2025-03-31 NOTE — TELEPHONE ENCOUNTER
Dr. Wegener,    Please see below MyChart message and advise.   Pt had hand xrays done on 3/18/25.    Radiologist IMPRESSION: Both hands and both wrists are negative for fracture or dislocation. There is mild degenerative change at the STT joint on the left. No erosive changes.     Thanks,   Annabella MCWILLIAMS RN

## 2025-04-01 ENCOUNTER — TELEPHONE (OUTPATIENT)
Dept: DERMATOLOGY | Facility: CLINIC | Age: 70
End: 2025-04-01
Payer: COMMERCIAL

## 2025-04-01 NOTE — TELEPHONE ENCOUNTER
Called patient to schedule surgery with Dr. Cagle    Date of Surgery: 06/11    Surgery type: excision    Consult scheduled: No    Has patient had mohs with us before? Not Applicable    Additional comments: emma Turner on 4/1/2025 at 1:31 PM

## 2025-04-03 ENCOUNTER — OFFICE VISIT (OUTPATIENT)
Dept: FAMILY MEDICINE | Facility: CLINIC | Age: 70
End: 2025-04-03
Payer: COMMERCIAL

## 2025-04-03 ENCOUNTER — MYC MEDICAL ADVICE (OUTPATIENT)
Dept: FAMILY MEDICINE | Facility: CLINIC | Age: 70
End: 2025-04-03
Payer: COMMERCIAL

## 2025-04-03 VITALS
SYSTOLIC BLOOD PRESSURE: 144 MMHG | OXYGEN SATURATION: 97 % | DIASTOLIC BLOOD PRESSURE: 84 MMHG | BODY MASS INDEX: 17.7 KG/M2 | HEIGHT: 68 IN | TEMPERATURE: 96.9 F | WEIGHT: 116.8 LBS | HEART RATE: 94 BPM | RESPIRATION RATE: 16 BRPM

## 2025-04-03 DIAGNOSIS — H10.403 CHRONIC CONJUNCTIVITIS OF BOTH EYES, UNSPECIFIED CHRONIC CONJUNCTIVITIS TYPE: ICD-10-CM

## 2025-04-03 DIAGNOSIS — R74.8 ELEVATED ALKALINE PHOSPHATASE LEVEL: ICD-10-CM

## 2025-04-03 DIAGNOSIS — J20.9 ACUTE BRONCHITIS WITH SYMPTOMS > 10 DAYS: Primary | ICD-10-CM

## 2025-04-03 DIAGNOSIS — R06.2 WHEEZING: ICD-10-CM

## 2025-04-03 RX ORDER — AZITHROMYCIN 250 MG/1
TABLET, FILM COATED ORAL
Qty: 6 TABLET | Refills: 0 | Status: SHIPPED | OUTPATIENT
Start: 2025-04-03 | End: 2025-04-08

## 2025-04-03 RX ORDER — PREDNISONE 20 MG/1
20 TABLET ORAL DAILY
Qty: 5 TABLET | Refills: 0 | Status: SHIPPED | OUTPATIENT
Start: 2025-04-03 | End: 2025-04-08

## 2025-04-03 RX ORDER — ALBUTEROL SULFATE 90 UG/1
2 INHALANT RESPIRATORY (INHALATION) EVERY 6 HOURS PRN
Qty: 18 G | Refills: 0 | Status: SHIPPED | OUTPATIENT
Start: 2025-04-03

## 2025-04-03 ASSESSMENT — PAIN SCALES - GENERAL: PAINLEVEL_OUTOF10: NO PAIN (0)

## 2025-04-03 NOTE — PROGRESS NOTES
{PROVIDER CHARTING PREFERENCE:787158}    Subjective   Nick is a 69 year old, presenting for the following health issues:  URI      4/3/2025     4:14 PM   Additional Questions   Roomed by Molly TOLBERT   Accompanied by n/a     URI    History of Present Illness       Reason for visit:  Cold symptons  Symptom onset:  1-2 weeks ago  Symptoms include:  Cough fatigue appetite sore throat headache  Symptom intensity:  Moderate  Symptom progression:  Improving  Had these symptoms before:  Yes  Has tried/received treatment for these symptoms:  No  What makes it better:  Sleep coughcongestion meds   He is taking medications regularly.        {MA/LPN/RN Pre-Provider Visit Orders- hCG/UA/Strep (Optional):217048}  Acute Illness  Acute illness concerns: Cold SX  Onset/Duration: 14 days   Symptoms:  Fever: No  Chills/Sweats: No  Headache (location?): YES  Sinus Pressure: YES  Conjunctivitis:  No  Ear Pain: no  Rhinorrhea: YES  Congestion: YES  Sore Throat: Not is this moment   Cough: YES  Wheeze: No  Decreased Appetite: No  Nausea: No  Vomiting: No  Diarrhea: No  Dysuria/Freq.: No  Dysuria or Hematuria: No  Fatigue/Achiness: YES- Fatigue   Sick/Strep Exposure: No  Therapies tried and outcome: Ibuprofen 200 mg, benadryl and robitussin some relief.  {additonal problems for provider to add (Optional):743215}    {ROS Picklists (Optional):298163}      Objective    There were no vitals taken for this visit.  There is no height or weight on file to calculate BMI.  Physical Exam   {Exam List (Optional):221959}    {Diagnostic Test Results (Optional):845427}        Signed Electronically by: Joel Daniel Wegener, MD  {Email feedback regarding this note to [email or web address]   :369153}   Quality 111:Pneumonia Vaccination Status For Older Adults: Pneumococcal Vaccination Previously Received Quality 130: Documentation Of Current Medications In The Medical Record: Current Medications Documented Quality 110: Preventive Care And Screening: Influenza Immunization: Influenza Immunization Administered during Influenza season Detail Level: Detailed

## 2025-04-03 NOTE — PATIENT INSTRUCTIONS
ASSESSMENT AND PLAN  1. Acute bronchitis with symptoms > 10 days (Primary)  Recommend albuterol 2 puffs four times daily for 3-4 days then use as needed for cough/mucous.     Start prednisone 20mg/day for 5 days now.        If not improving in 3-4 days then start azithromycin.       - albuterol (PROAIR HFA/PROVENTIL HFA/VENTOLIN HFA) 108 (90 Base) MCG/ACT inhaler; Inhale 2 puffs into the lungs every 6 hours as needed for wheezing.  Dispense: 18 g; Refill: 0  - predniSONE (DELTASONE) 20 MG tablet; Take 1 tablet (20 mg) by mouth daily for 5 days.  Dispense: 5 tablet; Refill: 0  - azithromycin (ZITHROMAX) 250 MG tablet; Take 2 tablets (500 mg) by mouth daily for 1 day, THEN 1 tablet (250 mg) daily for 4 days. (Start in 2-3 days if not improved).  Dispense: 6 tablet; Refill: 0    2. Wheezing    - albuterol (PROAIR HFA/PROVENTIL HFA/VENTOLIN HFA) 108 (90 Base) MCG/ACT inhaler; Inhale 2 puffs into the lungs every 6 hours as needed for wheezing.  Dispense: 18 g; Refill: 0  - predniSONE (DELTASONE) 20 MG tablet; Take 1 tablet (20 mg) by mouth daily for 5 days.  Dispense: 5 tablet; Refill: 0  - azithromycin (ZITHROMAX) 250 MG tablet; Take 2 tablets (500 mg) by mouth daily for 1 day, THEN 1 tablet (250 mg) daily for 4 days. (Start in 2-3 days if not improved).  Dispense: 6 tablet; Refill: 0    3. Elevated alkaline phosphatase level  - most often transient but rarely can indicate biliary obstruction.  Re-check today.     - Hepatic panel (Albumin, ALT, AST, Bili, Alk Phos, TP); Future      4 . Elevatged blood pressue without diagnosis of htn: traditionally reasonable blood pressure. Discussed

## 2025-04-05 ENCOUNTER — MYC MEDICAL ADVICE (OUTPATIENT)
Dept: FAMILY MEDICINE | Facility: CLINIC | Age: 70
End: 2025-04-05
Payer: COMMERCIAL

## 2025-04-05 DIAGNOSIS — F41.9 ANXIETY: Primary | ICD-10-CM

## 2025-04-07 LAB

## 2025-04-07 RX ORDER — HYDROXYZINE HYDROCHLORIDE 25 MG/1
25 TABLET, FILM COATED ORAL 3 TIMES DAILY PRN
Qty: 20 TABLET | Refills: 0 | Status: SHIPPED | OUTPATIENT
Start: 2025-04-07

## 2025-04-07 NOTE — TELEPHONE ENCOUNTER
I reviewed his chart. Alkaline phosphatase was elevated. I agree with Dr. Wood's recommendation about completing a liver ultrasound.     How long is his trip? Is he able to complete the ultrasound within the next 4 weeks?     For the anxiety, I can offer him hydroxyzine to take as needed.     (I Coby Maldonado MD covering for Wegener, Joel Daniel Irwin who is away from the office today).

## 2025-04-07 NOTE — TELEPHONE ENCOUNTER
JOHN and FS (DOD),  Please see below [employer] message and advise.  Thanks,  Nella MARRERO RN

## 2025-04-09 ENCOUNTER — ANCILLARY PROCEDURE (OUTPATIENT)
Dept: ULTRASOUND IMAGING | Facility: CLINIC | Age: 70
End: 2025-04-09
Attending: FAMILY MEDICINE
Payer: COMMERCIAL

## 2025-04-09 DIAGNOSIS — R74.8 ELEVATED ALKALINE PHOSPHATASE LEVEL: ICD-10-CM

## 2025-04-09 PROCEDURE — 76700 US EXAM ABDOM COMPLETE: CPT | Performed by: RADIOLOGY

## 2025-04-15 ENCOUNTER — MYC MEDICAL ADVICE (OUTPATIENT)
Dept: FAMILY MEDICINE | Facility: CLINIC | Age: 70
End: 2025-04-15
Payer: COMMERCIAL

## 2025-04-15 NOTE — LETTER
To whom it may concern,     I saw Mr. Marvin Elaine for an infectious acute respiratory illness April 3,2025.      Please excuse him for any travel related costs due to recently cancelled travel since he was not recovered/medically fit to travel or to be considered not contagious.     I have given a copy of this letter to Mr. Elaine to review and pass along to you.     Sincerely,       [place] medical license #[id number]  NPI# [id number]          Electronically signed

## 2025-04-16 ENCOUNTER — MYC MEDICAL ADVICE (OUTPATIENT)
Dept: FAMILY MEDICINE | Facility: CLINIC | Age: 70
End: 2025-04-16
Payer: COMMERCIAL

## 2025-04-16 DIAGNOSIS — N20.0 NEPHROLITHIASIS: Primary | ICD-10-CM

## 2025-04-16 NOTE — TELEPHONE ENCOUNTER
Dr. Wegener,    Please see below [employer] message and advise.   Pt had US Abdomen Complete on 4/09/25.  Radiologist Interpretation:  IMPRESSION:   1.  No acute intra-abdominal findings to account for patient's  symptoms. No intra or extrahepatic biliary dilatation.  2.  Two hepatic lobe cysts are not significantly changed dating back  to prior CT 10/13/2017.  3.  Nonobstructing left renal calculus measuring 10 mm.     I have personally reviewed the examination and initial interpretation  and I agree with the findings.     KEVIN SNYDER, DO     Thanks,   Annabella MCWILLIAMS, RN

## 2025-05-06 NOTE — TELEPHONE ENCOUNTER
Action May 6, 2025  12:26 PM MMT   Action Taken  Request faxed to [facility] for records     Request faxed to [facility] for images to be pushed to PACS.      Action May 6, 2025  2:07 PM EVE   Action Taken  Records received from [facility] and faxed to urgent scanning.     MEDICAL RECORDS REQUEST   [facility] Clinic  [address] [place] [address]  PHONE: [phone]  Fax: [phone]        FUTURE VISIT INFORMATION                                                   Marvin HEART [place], : 1955 scheduled for future visit at [facility]    APPOINTMENT INFORMATION:  Date: 25  Provider:  Bhavin Hung MD   Reason for Visit/Diagnosis: Nephrolithiasis [N20.0]     REFERRAL INFORMATION:  Referring provider: Wegener, Joel Daniel Irwin, MD - [facility]     RECORDS REQUESTED FOR VISIT                                                     NOTES  STATUS/DETAILS   OFFICE NOTE from referring provider  yes  25 Mychart - Wegener, Joel Daniel Irwin, MD - [facility]      OFFICE NOTE from other specialist  in process  [facility]?     DISCHARGE REPORT from the ER  yes  23 - [facility] ED visit     10/13/17 - [facility] ED visit with Markus Segura MD      OPERATIVE REPORT  in process   MEDICATION LIST  yes   LABS     URINALYSIS (UA)  yes   KIDNEY STONE     CT STONE (IMAGES & REPORT)  in process    Internal:   CT Abdomen Pelvis -0 10/13/17, 12    [facility]:   CT Abdomen Pelvis - 23   RENAL ULTRASOUND (IMAGES & REPORT)  in process  Internal:   US Abdomen - 25   XR KUB (IMAGES & REPORT)  in process   STONE ANALYSIS  in process     PRE-VISIT CHECKLIST      Joint diagnostic appointment coordinated correctly          (ensure right order & amount of time) Yes   RECORD COLLECTION COMPLETE No, pending records and imaging.

## 2025-05-15 ENCOUNTER — RESULTS FOLLOW-UP (OUTPATIENT)
Dept: FAMILY MEDICINE | Facility: CLINIC | Age: 70
End: 2025-05-15

## 2025-05-15 DIAGNOSIS — R74.8 ELEVATED ALKALINE PHOSPHATASE LEVEL: Primary | ICD-10-CM

## 2025-05-20 ENCOUNTER — PRE VISIT (OUTPATIENT)
Dept: UROLOGY | Facility: CLINIC | Age: 70
End: 2025-05-20
Payer: COMMERCIAL

## 2025-05-20 NOTE — TELEPHONE ENCOUNTER
Previsit Planning        Reason for visit: Consult     Relevant Information: Kidney Stones    Records/imaging/labs/orders: Available    Rooming: Standard

## 2025-05-23 ENCOUNTER — PRE VISIT (OUTPATIENT)
Dept: UROLOGY | Facility: CLINIC | Age: 70
End: 2025-05-23

## 2025-05-27 ENCOUNTER — TELEPHONE (OUTPATIENT)
Dept: UROLOGY | Facility: CLINIC | Age: 70
End: 2025-05-27

## 2025-05-27 DIAGNOSIS — N20.0 NEPHROLITHIASIS: Primary | ICD-10-CM

## 2025-05-27 NOTE — TELEPHONE ENCOUNTER
Left message for patient regarding scheduling surgery with Dr. Hung     Direct call back number given  Ph: [phone]      Veronica Isbell on 5/27/2025 at 10:44 AM

## 2025-05-27 NOTE — TELEPHONE ENCOUNTER
Patient left voicemail to schedule surgery with Dr. Hung.       Veroinca Isbell on 5/27/2025 at 4:32 PM

## 2025-05-28 NOTE — TELEPHONE ENCOUNTER
Called patient to schedule surgery with Dr. Hung    Spoke with: Patient     Date of Surgery: 06/23/2025 - First available    Estimated Arrival time Discussed with Patient:  No    Location of surgery: [facility] [facility]     Pre-Op H&P: Instructed to schedule w/ PCP - Wegener, Joel Daniel Irwin, MD within 10-30 days of surgery     Labs: Yes UA/UC completed 5/23    Imaging: Yes KUB - pt requesting clarification     Post-Op Appt Date: Dr. Hung  7/25 at 11AM    Post-Op Imaging Date:  Yes KUB - Order needed    Discussed with patient pre-op RN will call 2-3 days prior to surgery with arrival time and instructions:  Yes     [facility] Surgery Packet Sent: Yes 05/28/25  via [employer] Message      Additional Comments: Patient requested to confirm with Dr. Hung if KUB is needed prior to surgery. Patient states that during his appt Dr. Hung had said he needs the KUB prior, but he then reviewed the existing imaging in the chart and said this was not needed. Routed to surgeon and RN.     Patient is aware to have a  for surgery.     All patients questions were answered and was instructed to review surgical packet and call back with any questions or concerns.       Veronica Isbell on 5/28/2025 at 11:46 AM

## 2025-05-29 NOTE — TELEPHONE ENCOUNTER
LVM for pt to call back for surgery teaching and to schedule imaging. KUB needed before and after surgery. Additional order placed.

## 2025-06-05 ENCOUNTER — MYC MEDICAL ADVICE (OUTPATIENT)
Dept: FAMILY MEDICINE | Facility: CLINIC | Age: 70
End: 2025-06-05
Payer: COMMERCIAL

## 2025-06-11 ENCOUNTER — TELEPHONE (OUTPATIENT)
Dept: DERMATOLOGY | Facility: CLINIC | Age: 70
End: 2025-06-11

## 2025-06-11 ENCOUNTER — OFFICE VISIT (OUTPATIENT)
Dept: DERMATOLOGY | Facility: CLINIC | Age: 70
End: 2025-06-11
Payer: COMMERCIAL

## 2025-06-11 VITALS — DIASTOLIC BLOOD PRESSURE: 73 MMHG | HEART RATE: 82 BPM | SYSTOLIC BLOOD PRESSURE: 132 MMHG

## 2025-06-11 DIAGNOSIS — C44.622 SQUAMOUS CELL CARCINOMA OF ARM, RIGHT: Primary | ICD-10-CM

## 2025-06-11 DIAGNOSIS — D49.2 NEOPLASM OF UNSPECIFIED BEHAVIOR OF BONE, SOFT TISSUE, AND SKIN: ICD-10-CM

## 2025-06-11 PROCEDURE — 88305 TISSUE EXAM BY PATHOLOGIST: CPT | Mod: 26 | Performed by: DERMATOLOGY

## 2025-06-11 PROCEDURE — 88305 TISSUE EXAM BY PATHOLOGIST: CPT | Mod: TC | Performed by: DERMATOLOGY

## 2025-06-11 NOTE — NURSING NOTE
Chief Complaint   Patient presents with    Derm Problem     SCCIS R lateral antecubital fossa     JUNIE Hunt-BSN  Dermatology Surgery

## 2025-06-11 NOTE — PATIENT INSTRUCTIONS
Wound care    I will experience scar, bleeding, swelling, pain, crusting and redness. I may experience incomplete removal or recurrence. Risks are bleeding, bruising, swelling, infection, nerve damage, & a large wound. A second procedure may be recommended to obtain the best cosmetic or functional result.       A three month office visit with your Surgeon is recommended for scar evaluation. Please reach out sooner if you have concerns about you surgical site/wound.    Caring for your skin after surgery    After your surgery, a pressure bandage will be placed over the area that has stitches. This is important to prevent bleeding. Please follow these instructions over the next 1 to 2 weeks. Following this regimen will help to prevent complications as your wound heals.     For the first 48 hours after your surgery:    Leave the pressure dressing on and keep it dry. If it should come loose, you may re-tape it, but do not take it off.  Relax and take it easy. Do not do any vigorous exercise or heavy lifting. This could cause the wound to bleed.  Post-Operative pain is usually mild. If you are able to take tylenol, You may take plain or extra-strength Tylenol (acetaminophen) As directed on the bottle (do not take more than 4,000mg in one day). If you are able to take ibuprofen, you can alternate the tylenol and ibuprofen.   Avoid alcohol as this may increase your tendency to bleed.   You may put an ice pack around the bandaged area for 20 minutes at a time as needed. This may help reduce swelling, bruising, and pain. Make sure the ice pack is waterproof so that the pressure bandage doesn t get wet.  If the wound is on the face try to sleep with your head elevated. Either in a recliner or propped up in bed, this will decrease swelling around the eyes.   You may see a small amount of drainage or blood on your pressure bandage. This is normal. However:  If drainage or bleeding continues or saturates the bandage, you will  need to apply firm pressure over the bandage with a piece of gauze for 15 minutes.  If bleeding continues after applying pressure for 15 minutes, apply an ice pack to the bandaged area for 15 minutes.  If bleeding still continues, call our office or go to the nearest emergency room.    Remove pressure dressing 48 hours after surgery:    Carefully remove the pressure bandage. If it seems sticky or too difficult to get off, you may need to soak it off in the shower.  After the pressure dressing is removed, you may shower and get the wound wet. However, Do Not let the forceful stream of the shower hit the wound directly.  Follow these wound care and dressing change instructions:    You have skin glue over the stitches. This will dry and flake off with time (about 2 weeks). If the stitches are still hanging around after 2 weeks, let us know, we can clip them out for you if they do not fall out with washing.   You may allow water to run over the site. Do not soak.  Do Not rub or scrub the site.  Pat dry after the shower or bath.  Avoid topical medications, lotions, creams, ointment,or oils.  Do not use tanning lamps or expose the site to sun.   Check wound appearance daily, some swelling and redness is normal after a procedure but should go away as your would is healing. If the swelling and redness or pain increases or if any other signs of infection occur listed below please send in a photo via my chart and or call us to let us know.  The clear glue film should start peeling and flaking off approximately around 2 weeks. By this time your wound should be sufficiently healed. If it still looks to be healing when the glue comes off you can clean the wound with soapy warm water daily in the shower. No need to bandage further, but you can put a bandage on the area daily for the two weeks if you would like.   If the glue comes off early before 2 weeks, apply vaseline and a bandage daily until the 2 weeks post-surgery date.  "Make sure to continue to clean the area daily before applying the vaseline and bandage.   If skin glue and sutures are still intact at 2 weeks after your procedure, you can start applying Vaseline daily to help soften up the skin glue. It will come off easier this way.        If you are able to take acetaminophen (\"Tylenol,\" etc.) and ibuprofen (\"Advil\" or \"Motrin,\" etc.), then you may STAGGER these medications by taking 400 mg of ibuprofen (usually two tabs) every 8 hours and 1,000 mg of acetaminophen (e.g., two tabs of extra-strength Tylenol) every 8 hours.    This means, for example, that you could take the followin,000 mg of Tylenol, followed 4 hours later by 400 mg Ibuprofen, followed 4 hours later with 1,000 mg of Tylenol, followed 4 hours later by 400 mg Ibuprofen, followed 4 hours later with 1,000 mg of Tylenol, and so forth.     Essentially, you can take either 1,000 mg of Tylenol or 400 mg of ibuprofen in alternating fashion EVERY FOUR HOURS.    Do NOT exceed more than 4,000 mg of Tylenol or 3,200 mg of ibuprofen per 24 hours. If you are not able to take Tylenol or ibuprofen as above due to other health issues (or a physician has told you directly that you are not allowed to take one of them, say due to pre-existing severe liver or kidney issues), then disregard the above directions.    Scientific evidence supports that this combination/schedule of pain medications is just as effective, if not more effective, than taking a narcotic pain medicine.       Follow up will be a 3 month scar evaluation either in person or via a telephone visit with you sending in a photo via [employer]. Unless you have been told to follow up sooner or if you have concerns and would like to be see sooner. Please call or send us in a [employer] message if possible and attach a photo.        What to expect:    The first couple of days your wound may be tender and may bleed slightly when doing wound care.  There may be swelling and " bruising around the wound, especially if it is near the eyes. For your comfort, you may apply ice or cold compresses to the bruises after your have removed the pressure bandage.  The area around your wound may be numb for several weeks or even months.  You may experience periodic sharp pain or mild itching around the wound as it heals.   The suture line will look dark for a while but will lighten over time.       When to call us:    You have bleeding that will not stop after applying pressure and ice.  You have pain that is not controlled with Tylenol (acetaminophen.)  You have signs or symptoms of an infection such as:  Fever over 100 degrees Fahrenheit  Redness, warmth or foul-smelling drainage from the wound  If you have any questions, or are not sure how to take care of the wound.    Phone numbers:    During business hours (M-F 8:00-4:30 p.m.)  [facility]-  [phone] Option 1 appt. Desk and ask for the Dermatology Surgery Team  [phone] Dermatology .     ---------------------------------------------------------  Evenings/Weekends/Holidays  Hospital - [phone]   TTY for hearing viapkzpp-[phone]  *Ask  to page dermatologist on-call  Emergency Oqga-[phone]  TTY for hearing impaired- [phone]

## 2025-06-11 NOTE — LETTER
6/11/2025       RE: Marvin Elaine  [address]  [facility] [address]     Dear Colleague,    Thank you for referring your patient, Marvin Elaine, to the [facility] D[facility] SURGERY [facility]INIC [place] at [facility]. Please see a copy of my visit note below.    DERMATOLOGY EXCISION PROCEDURE NOTE    Dermatology Problem List:  Last skin check 03/14/2025     1. SCCis, R lateral antecubital fossa   2. NUB, R lateral neck  - s/p shave bx 03/14/2025  3. NMSC  - n & micronod BCC, right forearm, s/p shave 4/19/24, s/p exc 6/20/24  4. 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 Folliculitis, R forearm, adjacent to excision site  - Mupirocin ointment   6.. AKs. LiqN2.   7.  Acne/folliculitis with acne excorie/prurigo nodularis/atopic dermatitis.   - current tx: BP 4-5% wash, clindamycin 1% lotion, clindamycin 1% solution, tacrolimus 0.1%   - previous tx:doxycycline 100 mg PO BID/qdaily x 6 months (improvement),  bleach baths, hydrocortisone 0.2% cream  8.Seborrheic dermatitis.   - ketoconazole 2% shampoo three times per week  - Synalar solution at bedtime   9. Atopic dermatitis   - current tx: TMC 0.025% cream     10. LTM  - L antecubital fossa 1.25 mm brown macule   _______________________    NAME OF PROCEDURE: Excision with complx linear closure  Staff surgeon: Dr. Brandon Cagle MD   Resident: none  Scrub Nurse: margarito    PRE-OPERATIVE DIAGNOSIS:  Squamous cell carcinoma  POST-OPERATIVE DIAGNOSIS: Same   LOCATION: R lateral antecubital fossa  FINAL EXCISION SIZE(DEFECT SIZE): 1.6 cm  MARGIN: 0.4 cm  FINAL REPAIR LENGTH: 4.0 cm   ANESTHESIA: 6cc 1% lidocaine with 1:100,000 epinephrine    INDICATIONS: This patient presented with a 0.8 cm Squamous cell carcinoma. Excision was indicated. We discussed the principles of treatment and most likely complications including scarring, bleeding, infection, incomplete excision, wound dehiscence, pain, nerve  damage, and recurrence. Informed consent was obtained and the patient underwent the procedure as follows:    PROCEDURE: The patient was taken to the operative suite. Time-out was performed. The treatment area was anesthetized with 1% lidocaine with epinephrine. The area was prepped with Chlorhexidine and rinsed with sterile saline and draped with sterile towels. The lesion was delineated and excised down to subcutaneous fat in a elliptical manner. Hemostasis was obtained by electrocoagulation.     REPAIR: An complex layered linear closure was selected as the procedure which would maximally preserve both function and cosmesis.    After the excision of the tumor, the area was extensively undermined. Hemostasis was obtained with bipolar electrocoagulation. Closure was oriented so that the wound was in the patient's natural skin tension lines. The deeper layers of subcutaneous and superficial (nonmuscle) fascia tissues were then approximated using 4-0 Monocryl buried verticle mattress sutures (deep layer suturing). The wound edges were then approximated; additional buried sutures were placed in a similar fashion where needed. 5-0 Vicryl Rapide sutures (superficial layer suturing) were carefully placed for maximum eversion and meticulous approximation.    Estimated blood loss was less than 10 ml for all surgical sites. A sterile pressure dressing was applied and wound care instructions, with a written handout, were given. The patient was discharged from the D[facility]atologic Surgery Center alert and ambulatory.    The patient elected for pathology results to automatically release and understands that the clinical staff will contact them as soon as possible to notify them of the results.    Follow-up: PRN    Dr. Brandon Cagle MD was physically present  for the entire procedure, key portions of the reconstruction and always immediately available.     Anatomic Pathology Results: pending    Clinical Follow-Up: gen derm 6  months    Staff Involved:  Staff Only    Attending attestation:  I personally performed the entire procedure.  I have reviewed the note and edited it as necessary, and agree with its contents.    Brandon Cagle M.D.  Professor  Director of Dermatologic Surgery  Department of Dermatology  [facility]    Dermatology Surgery Clinic  [facility]  [address] [place], [place] [id number]        Again, thank you for allowing me to participate in the care of your patient.      Sincerely,    Brandon Cagle MD

## 2025-06-11 NOTE — PROGRESS NOTES
DERMATOLOGY EXCISION PROCEDURE NOTE    Dermatology Problem List:  Last skin check 03/14/2025     1. SCCis, R lateral antecubital fossa   2. NUB, R lateral neck  - s/p shave bx 03/14/2025  3. NMSC  - n & micronod BCC, right forearm, s/p shave 4/19/24, s/p exc 6/20/24  4. 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 Folliculitis, R forearm, adjacent to excision site  - Mupirocin ointment   6.. AKs. LiqN2.   7.  Acne/folliculitis with acne excorie/prurigo nodularis/atopic dermatitis.   - current tx: BP 4-5% wash, clindamycin 1% lotion, clindamycin 1% solution, tacrolimus 0.1%   - previous tx:doxycycline 100 mg PO BID/qdaily x 6 months (improvement),  bleach baths, hydrocortisone 0.2% cream  8.Seborrheic dermatitis.   - ketoconazole 2% shampoo three times per week  - Synalar solution at bedtime   9. Atopic dermatitis   - current tx: TMC 0.025% cream     10. LTM  - L antecubital fossa 1.25 mm brown macule   _______________________    NAME OF PROCEDURE: Excision with complx linear closure  Staff surgeon: Dr. Brandon Cagle MD   Resident: none  Scrub Nurse: margarito    PRE-OPERATIVE DIAGNOSIS:  Squamous cell carcinoma  POST-OPERATIVE DIAGNOSIS: Same   LOCATION: R lateral antecubital fossa  FINAL EXCISION SIZE(DEFECT SIZE): 1.6 cm  MARGIN: 0.4 cm  FINAL REPAIR LENGTH: 4.0 cm   ANESTHESIA: 6cc 1% lidocaine with 1:100,000 epinephrine    INDICATIONS: This patient presented with a 0.8 cm Squamous cell carcinoma. Excision was indicated. We discussed the principles of treatment and most likely complications including scarring, bleeding, infection, incomplete excision, wound dehiscence, pain, nerve damage, and recurrence. Informed consent was obtained and the patient underwent the procedure as follows:    PROCEDURE: The patient was taken to the operative suite. Time-out was performed. The treatment area was anesthetized with 1% lidocaine with epinephrine. The area was prepped with Chlorhexidine and rinsed with sterile saline and  draped with sterile towels. The lesion was delineated and excised down to subcutaneous fat in a elliptical manner. Hemostasis was obtained by electrocoagulation.     REPAIR: An complex layered linear closure was selected as the procedure which would maximally preserve both function and cosmesis.    After the excision of the tumor, the area was extensively undermined. Hemostasis was obtained with bipolar electrocoagulation. Closure was oriented so that the wound was in the patient's natural skin tension lines. The deeper layers of subcutaneous and superficial (nonmuscle) fascia tissues were then approximated using 4-0 Monocryl buried verticle mattress sutures (deep layer suturing). The wound edges were then approximated; additional buried sutures were placed in a similar fashion where needed. 5-0 Vicryl Rapide sutures (superficial layer suturing) were carefully placed for maximum eversion and meticulous approximation.    Estimated blood loss was less than 10 ml for all surgical sites. A sterile pressure dressing was applied and wound care instructions, with a written handout, were given. The patient was discharged from the Dermatologic Surgery Center alert and ambulatory.    The patient elected for pathology results to automatically release and understands that the clinical staff will contact them as soon as possible to notify them of the results.    Follow-up: PRN    Dr. Brandon Cagle MD was physically present  for the entire procedure, key portions of the reconstruction and always immediately available.     Anatomic Pathology Results: pending    Clinical Follow-Up: gen derm 6 months    Staff Involved:  Staff Only    Attending attestation:  I personally performed the entire procedure.  I have reviewed the note and edited it as necessary, and agree with its contents.    Brandon Cagle M.D.  Professor  Director of Dermatologic Surgery  Department of Dermatology  [facility]    Dermatology Surgery  [facility]  [address] [place] [address]

## 2025-06-11 NOTE — TELEPHONE ENCOUNTER
Follow up call completed following excision procedure with Dr. Cagle.       Are you having pain? NO  Are you taking pain medication? Ibuprofen   Are you applying ice?  NO  Have you had any noticeable bleeding through the bandage? NO   Do you have any other concerns? NO       Please call [phone] if you have any questions or concerns during business hours. For concerns after hours, call the hospital  to reach the dermatology resident on call at [phone], option 4.

## 2025-06-12 ENCOUNTER — ANCILLARY PROCEDURE (OUTPATIENT)
Dept: GENERAL RADIOLOGY | Facility: CLINIC | Age: 70
End: 2025-06-12
Attending: UROLOGY
Payer: COMMERCIAL

## 2025-06-12 DIAGNOSIS — N20.0 NEPHROLITHIASIS: ICD-10-CM

## 2025-06-17 ENCOUNTER — OFFICE VISIT (OUTPATIENT)
Dept: FAMILY MEDICINE | Facility: CLINIC | Age: 70
End: 2025-06-17
Payer: COMMERCIAL

## 2025-06-17 VITALS
HEIGHT: 68 IN | BODY MASS INDEX: 18.35 KG/M2 | HEART RATE: 84 BPM | DIASTOLIC BLOOD PRESSURE: 82 MMHG | WEIGHT: 121.1 LBS | RESPIRATION RATE: 16 BRPM | SYSTOLIC BLOOD PRESSURE: 156 MMHG | OXYGEN SATURATION: 100 % | TEMPERATURE: 97.7 F

## 2025-06-17 DIAGNOSIS — Z01.818 PREOP GENERAL PHYSICAL EXAM: Primary | ICD-10-CM

## 2025-06-17 DIAGNOSIS — N20.0 NEPHROLITHIASIS: ICD-10-CM

## 2025-06-17 DIAGNOSIS — R74.8 ELEVATED ALKALINE PHOSPHATASE LEVEL: ICD-10-CM

## 2025-06-17 LAB
ALBUMIN SERPL BCG-MCNC: 4.1 G/DL (ref 3.5–5.2)
ALP SERPL-CCNC: 283 U/L (ref 40–150)
ALT SERPL W P-5'-P-CCNC: 62 U/L (ref 0–70)
ANION GAP SERPL CALCULATED.3IONS-SCNC: 11 MMOL/L (ref 7–15)
AST SERPL W P-5'-P-CCNC: 41 U/L (ref 0–45)
BILIRUB SERPL-MCNC: 0.7 MG/DL
BUN SERPL-MCNC: 18.2 MG/DL (ref 8–23)
CALCIUM SERPL-MCNC: 9.2 MG/DL (ref 8.8–10.4)
CHLORIDE SERPL-SCNC: 101 MMOL/L (ref 98–107)
CREAT SERPL-MCNC: 1.04 MG/DL (ref 0.67–1.17)
EGFRCR SERPLBLD CKD-EPI 2021: 77 ML/MIN/1.73M2
ERYTHROCYTE [DISTWIDTH] IN BLOOD BY AUTOMATED COUNT: 13 % (ref 10–15)
GLUCOSE SERPL-MCNC: 94 MG/DL (ref 70–99)
HCO3 SERPL-SCNC: 27 MMOL/L (ref 22–29)
HCT VFR BLD AUTO: 41.7 % (ref 40–53)
HGB BLD-MCNC: 14 G/DL (ref 13.3–17.7)
MCH RBC QN AUTO: 30.4 PG (ref 26.5–33)
MCHC RBC AUTO-ENTMCNC: 33.6 G/DL (ref 31.5–36.5)
MCV RBC AUTO: 91 FL (ref 78–100)
PLATELET # BLD AUTO: 232 10E3/UL (ref 150–450)
POTASSIUM SERPL-SCNC: 4.3 MMOL/L (ref 3.4–5.3)
PROT SERPL-MCNC: 6.6 G/DL (ref 6.4–8.3)
RBC # BLD AUTO: 4.6 10E6/UL (ref 4.4–5.9)
SODIUM SERPL-SCNC: 139 MMOL/L (ref 135–145)
WBC # BLD AUTO: 5.2 10E3/UL (ref 4–11)

## 2025-06-17 ASSESSMENT — PAIN SCALES - GENERAL: PAINLEVEL_OUTOF10: NO PAIN (0)

## 2025-06-17 NOTE — PROGRESS NOTES
Preoperative Evaluation  [facility]  [address], [address]  [facility] [address]  Phone: [phone]  Primary Provider: Joel Daniel Wegener, MD  Pre-op Performing Provider: Joel Daniel Wegener, MD  Jun 17, 2025 6/17/2025   Surgical Information   What procedure is being done? Lithotripsy   Facility or Hospital where procedure/surgery will be performed: [facility]   Who is doing the procedure / surgery? Dr Hung   Date of surgery / procedure: 23 June   Time of surgery / procedure: TBD   Where do you plan to recover after surgery? at home with family     Fax number for surgical facility: Note does not need to be faxed, will be available electronically in Epic.    Assessment & Plan     The proposed surgical procedure is considered LOW risk.    Preop general physical exam  Cleared for procedure.     Recommend tdap (tetanus and whooping cough vaccine) from pharmacy prior to the procedure.     Do not take any over the counter aspirin or pain medications except for tylenol (acetaminophen) for 10 days before the procedure.     Skip any pills on the morning of the procedure.     Your blood pressure is low enough to clear you for the procedure however I would still recommend checking home resting blood pressures with an arm cuff to ensure that blood pressures at goal (less than 140/90 or even 130/80) at home.  Please let me know home blood pressures in 2-3 weeks.     Discussed bronchitis/wheezing with poor air quality recently now resolved.  If becomes recurrent may need to consider asthma diagnosis.     Reviewed elevated alkaline phosphatase evaluation which does not point to any worrisome cause such as bony cancer or liver cancer.  Very likely due to paroxetine and atorvastatin, no medication changes needed based on that. Re-check today. Recent liver/kidney ultrasound which does not point to any structural cause of elevated alkaline phospatase.  Alk phos isoenzymes mainly  liver/not bone component which also speaks against bony metastasis as cause as well as the fact that he is not having progressive or unexplained bone pain.   - Comprehensive metabolic panel; Future  - CBC with platelets; Future  - Comprehensive metabolic panel  - CBC with platelets    Nephrolithiasis  Reason for procedure.     Elevated alkaline phosphatase level  As above. Check ggt as well.     35 minutes spent on the date of the encounter doing chart review, history and exam, negotiating and explaining the plan with the patient, documentation and further activities as noted above.     The longitudinal plan of care for the diagnosis(es)/condition(s) as documented were addressed during this visit. Due to the added complexity in care, I will continue to support Nick in the subsequent management and with ongoing continuity of care.              - No identified additional risk factors other than previously addressed        Recommendation  Approval given to proceed with proposed procedure, without further diagnostic evaluation.    Follow-up   No follow-ups on file.        Subjective   Nick is a 69 year old, presenting for the following:  Pre-Op Exam          6/17/2025    11:52 AM   Additional Questions   Roomed by Molly TOLBERT   Accompanied by n/a     HPI: planning surgery for lithotripsy  to prevent kidney damage from the stone since large.         6/17/2025   Pre-Op Questionnaire   Have you ever had a heart attack or stroke? No   Have you ever had surgery on your heart or blood vessels, such as a stent placement, a coronary artery bypass, or surgery on an artery in your head, neck, heart, or legs? No   Do you have chest pain with activity? No   Do you have a history of heart failure? No   Do you currently have a cold, bronchitis or symptoms of other infection? No not currently   Do you have a cough, shortness of breath, or wheezing? No   Do you or anyone in your family have previous history of blood clots? No   Do you or  does anyone in your family have a serious bleeding problem such as prolonged bleeding following surgeries or cuts? No   Have you ever had problems with anemia or been told to take iron pills? No   Have you had any abnormal blood loss such as black, tarry or bloody stools? No   Have you ever had a blood transfusion? No   Are you willing to have a blood transfusion if it is medically needed before, during, or after your surgery? Yes   Have you or any of your relatives ever had problems with anesthesia? No   Do you have sleep apnea, excessive snoring or daytime drowsiness? No   Do you have any artifical heart valves or other implanted medical devices like a pacemaker, defibrillator, or continuous glucose monitor? No   Do you have artificial joints? No   Are you allergic to latex? No     Advance Care Planning    Discussed advance care planning with patient; informed AVS has link to [employer].    Preoperative Review of    reviewed - alprazolam          Patient Active Problem List    Diagnosis Date Noted    Acute bilateral low back pain without sciatica 02/21/2025     Priority: Medium    Hyperlipidemia LDL goal <130 12/27/2021     Priority: Medium    Encounter for general adult medical examination with abnormal findings 12/20/2018     Priority: Medium    Chronic midline low back pain without sciatica 01/18/2017     Priority: Medium    Osteochondritis 11/22/2016     Priority: Medium    Chronic pelvic pain in male 06/27/2014     Priority: Medium    CARDIOVASCULAR SCREENING; LDL GOAL LESS THAN 160 03/12/2014     Priority: Medium    Anxiety 02/28/2014     Priority: Medium    Persistent depressive disorder 02/28/2014     Priority: Medium      Past Medical History:   Diagnosis Date    Anxiety     Depressive disorder     Prostate infection      Past Surgical History:   Procedure Laterality Date    COLONOSCOPY  2017    COLONOSCOPY N/A 3/7/2023    Procedure: COLONOSCOPY;  Surgeon: Bozena Franco MD;   Location: [facility] OR    EYE SURGERY  11/21    LAPAROSCOPIC APPENDECTOMY  01/21/2012    Procedure:LAPAROSCOPIC APPENDECTOMY; Open Appendectomy; Surgeon:GERDA GASTON; Location:[facility] OR     Current Outpatient Medications   Medication Sig Dispense Refill    albuterol (PROAIR HFA/PROVENTIL HFA/VENTOLIN HFA) 108 (90 Base) MCG/ACT inhaler Inhale 2 puffs into the lungs every 6 hours as needed for wheezing. 18 g 0    atorvastatin (LIPITOR) 40 MG tablet Take 1 tablet (40 mg) by mouth daily. 90 tablet 3    budesonide-formoterol (SYMBICORT/BREYNA) 80-4.5 MCG/ACT Inhaler Inhale 2 puffs into the lungs 2 times daily. 6.9 g 1    clindamycin (CLEOCIN T) 1 % external lotion Apply once daily after showering. 60 mL 11    clindamycin 1 % EX external solution Apply 10-15 drops once daily to the scalp. 60 mL 1    fluocinonide (LIDEX) 0.05 % external ointment Apply twice daily as needed for itchy spots. Please keep f/u appt. 30 g 5    fluocinonide (LIDEX) 0.05 % external solution Apply 10-15 drops to scalp at nighttime before bed as needed for itch/scale. 60 mL 11    fluorouracil (EFUDEX) 5 % external cream Apply topically 2 times daily To lower lip for 1-2 weeks as tolerated, stop when significant irritation occurs (Patient not taking: Reported on 6/11/2025) 40 g 1    hydrocortisone valerate (WEST-LAM) 0.2 % external ointment Apply topically 2 times daily. To rash as needed 60 g 3    hydrOXYzine HCl (ATARAX) 25 MG tablet Take 1 tablet (25 mg) by mouth 3 times daily as needed for anxiety. 20 tablet 0    ketoconazole (NIZORAL) 2 % external shampoo Apply topically three times a week. LATHER ONTO FOREHEAD AND SCALP AND LET SIT FOR 1 TO 2 MINUTES BEFORE RINSING. USE AT LEAST 2 TO 3 TIMES WEEKLY IN THE SHOWER. 120 mL 11    methylphenidate (RITALIN) 10 MG tablet Take 10 mg by mouth daily as needed      mupirocin (BACTROBAN) 2 % external ointment Use 2 times a day to affected area when pimple like lesion develops 30 g 1    PARoxetine  "(PAXIL) 40 MG tablet Take 1 tablet (40 mg) by mouth every morning      predniSONE (DELTASONE) 20 MG tablet Take 1 tablet (20 mg) by mouth daily. (Patient not taking: Reported on 2025) 5 tablet 0    triamcinolone (KENALOG) 0.025 % cream Apply topically 2 times daily. To rash on trunk and extremities as needed (Patient not taking: Reported on 2025) 80 g 3       No Known Allergies     Social History     Tobacco Use    Smoking status: Never    Smokeless tobacco: Never   Substance Use Topics    Alcohol use: No     Family History   Problem Relation Age of Onset    Respiratory Mother         copd    Psychotic Disorder Mother     Mental Illness Mother     Substance Abuse Mother     C.A.D. Father     Psychotic Disorder Father         depression, anxiety, alcohol abuse    Coronary Artery Disease Father     Hypertension Father     Hyperlipidemia Father     Substance Abuse Father     Substance Abuse Maternal Grandfather     Thyroid Disease Maternal Grandmother     Substance Abuse Paternal Grandfather     Hypertension Brother     Hyperlipidemia Brother     Substance Abuse Brother     Substance Abuse Brother          in  of fentynal OD    Prostate Cancer No family hx of     Cancer - colorectal No family hx of     Diabetes No family hx of     Skin Cancer No family hx of      History   Drug Use No             Review of Systems  Constitutional, neuro, ENT, endocrine, pulmonary, cardiac, gastrointestinal, genitourinary, musculoskeletal, integument and psychiatric systems are negative, except as otherwise noted.    Objective    There were no vitals taken for this visit.   Estimated body mass index is 17.48 kg/m  as calculated from the following:    Height as of 25: 1.735 m (5' 8.31\").    Weight as of 25: 52.6 kg (116 lb).  Physical Exam  GENERAL: alert and no distress  EYES: Eyes grossly normal to inspection, PERRL and conjunctivae and sclerae normal  NECK: no adenopathy, no asymmetry, masses, or " scars  RESP: lungs clear to auscultation - no rales, rhonchi or wheezes  CV: regular rate and rhythm, normal S1 S2, no S3 or S4, no murmur, click or rub, no peripheral edema  ABDOMEN: soft, nontender, no hepatosplenomegaly, no masses and bowel sounds normal  MS: no gross musculoskeletal defects noted, no edema  SKIN: no suspicious lesions or rashes  NEURO: Normal strength and tone, mentation intact and speech normal  PSYCH: mentation appears normal, affect normal/bright    Recent Labs   Lab Test 03/04/25  1322      POTASSIUM 5.0   CR 1.04        Diagnostics  Recent Results (from the past 720 hours)   Alkaline phosphatase    Collection Time: 05/23/25  7:46 AM   Result Value Ref Range    Alkaline Phosphatase 213 (H) 40 - 150 U/L   Alkaline phosphatase isoenzymes    Collection Time: 05/23/25  7:46 AM   Result Value Ref Range    Alkptase Isoenzymes 208 (H) 40 - 120 U/L    Alkptase % Liver 168 (H) 0 - 94 U/L    Alkptase % Bone 40 0 - 55 U/L    Alkptase % Other 0 U/L   UA with Microscopic reflex to Culture    Collection Time: 05/23/25  7:53 AM    Specimen: Urine, NOS   Result Value Ref Range    Color Urine Light Yellow Colorless, Straw, Light Yellow, Yellow    Appearance Urine Clear Clear    Glucose Urine Negative Negative mg/dL    Bilirubin Urine Negative Negative    Ketones Urine Negative Negative mg/dL    Specific Gravity Urine 1.017 1.003 - 1.035    Blood Urine Small (A) Negative    pH Urine 6.5 5.0 - 7.0    Protein Albumin Urine Negative Negative mg/dL    Urobilinogen Urine Normal Normal mg/dL    Nitrite Urine Negative Negative    Leukocyte Esterase Urine Negative Negative    Mucus Urine Present (A) None Seen /LPF    RBC Urine 20 (H) <=2 /HPF    WBC Urine 1 <=5 /HPF   Dermatological Path Order and Indications    Collection Time: 06/11/25  1:07 PM   Result Value Ref Range    Case Report       Surgical Pathology Report                         Case: [id number]                                  Authorizing Provider:  " Brandon Cagle MD             Collected:           06/11/2025 01:07 PM          Ordering Location:     [facility]          Received:            06/12/2025 09:14 AM                                 Dermatologic Surgery                                                                                [facility]                                                           Pathologist:           Cameron Lee MD                                                         Specimen:    Skin, R lateral antecubital fossa                                                          Final Diagnosis       Right lateral antecubital fossa:  - Scar from the prior surgical procedure, with no evidence of residual lesion - (see description)      Clinical Information       The patient is a 69 year old male.      Gross Description       A(1). Skin, R lateral antecubital fossa:  The specimen is received in formalin with proper patient identification, labeled \"right lateral antecubital fossa\".  The specimen consists of an unoriented, tan-white, wrinkled, elliptical excision of skin, 2.8 x 1.3 cm, excised to a maximum depth of 0.3 cm.  The epidermal surface is grossly unremarkable.  The underlying subcutaneous tissue is predominantly tan-yellow, lobulated, edematous adipose tissue.  The surgical margin is inked black.  The specimen is entirely, sequentially submitted as A1-3 to include the tips as A1.       Microscopic Description       The specimen exhibits flattened epidermis, dermal fibrosis and lymphohistiocytic inflammation consistent with scar from the prior procedure.  No residual squamous cell carcinoma in situ is identified.      Performing Labs       The technical component of this testing was completed at [facility].    Stain controls for all stains resulted within this report have been reviewed and show appropriate reactivity.      Comprehensive metabolic panel "    Collection Time: 06/17/25 12:48 PM   Result Value Ref Range    Sodium 139 135 - 145 mmol/L    Potassium 4.3 3.4 - 5.3 mmol/L    Carbon Dioxide (CO2) 27 22 - 29 mmol/L    Anion Gap 11 7 - 15 mmol/L    Urea Nitrogen 18.2 8.0 - 23.0 mg/dL    Creatinine 1.04 0.67 - 1.17 mg/dL    GFR Estimate 77 >60 mL/min/1.73m2    Calcium 9.2 8.8 - 10.4 mg/dL    Chloride 101 98 - 107 mmol/L    Glucose 94 70 - 99 mg/dL    Alkaline Phosphatase 283 (H) 40 - 150 U/L    AST 41 0 - 45 U/L    ALT 62 0 - 70 U/L    Protein Total 6.6 6.4 - 8.3 g/dL    Albumin 4.1 3.5 - 5.2 g/dL    Bilirubin Total 0.7 <=1.2 mg/dL   CBC with platelets    Collection Time: 06/17/25 12:48 PM   Result Value Ref Range    WBC Count 5.2 4.0 - 11.0 10e3/uL    RBC Count 4.60 4.40 - 5.90 10e6/uL    Hemoglobin 14.0 13.3 - 17.7 g/dL    Hematocrit 41.7 40.0 - 53.0 %    MCV 91 78 - 100 fL    MCH 30.4 26.5 - 33.0 pg    MCHC 33.6 31.5 - 36.5 g/dL    RDW 13.0 10.0 - 15.0 %    Platelet Count 232 150 - 450 10e3/uL      No EKG required, no history of coronary heart disease, significant arrhythmia, peripheral arterial disease or other structural heart disease.    Revised Cardiac Risk Index (RCRI)  The patient has the following serious cardiovascular risks for perioperative complications:   - No serious cardiac risks = 0 points     RCRI Interpretation: 0 points: Class I (very low risk - 0.4% complication rate)         Signed Electronically by: Joel Daniel Wegener, MD  A copy of this evaluation report is provided to the requesting physician.

## 2025-06-17 NOTE — PATIENT INSTRUCTIONS
Cleared for procedure assuming appropriate lab results    Recommend tdap (tetanus and whooping cough vaccine) from pharmacy prior to the procedure.     Do not take any over the counter aspirin or pain medications except for tylenol (acetaminophen) for 10 days before the procedure.     Skip any pills on the morning of the procedure.     Your blood pressure is low enough to clear you for the procedure however I would still recommend checking home resting blood pressures with an arm cuff to ensure that blood pressures at goal (less than 140/90 or even 130/80) at home.  Please let me know home blood pressures in 2-3 weeks.     Discussed bronchitis/wheezing with poor air quality recently now resolved.  If becomes recurrent may need to consider asthma diagnosis.     Reviewed elevated alkaline phosphatase evaluation which does not point to any worrisome cause such as bony cancer or liver cancer.  Very likely due to paroxetine and atorvastatin, no medication changes needed based on that.          Patient Education   Preparing for Your Surgery  For Adults  Getting started  In most cases, a nurse will call to review your health history and instructions. They will give you an arrival time based on your scheduled surgery time. Please be ready to share:  Your doctor's clinic name and phone number  Your medical, surgical, and anesthesia history  A list of allergies and sensitivities  A list of medicines, including herbal treatments and over-the-counter drugs  Whether the patient has a legal guardian (ask how to send us the papers in advance)  Note: You may not receive a call if you were seen at our PAC (Preoperative Assessment Center).  Please tell us if you're pregnant--or if there's any chance you might be pregnant. Some surgeries may injure a fetus (unborn baby), so they require a pregnancy test. Surgeries that are safe for a fetus don't always need a test, and you can choose whether to have one.   Preparing for  surgery  Within 10 to 30 days of surgery: Have a pre-op exam (sometimes called an H&P, or History and Physical). This can be done at a clinic or pre-operative center.  If you're having a , you may not need this exam. Talk to your care team.  At your pre-op exam, talk to your care team about all medicines you take. (This includes CBD oil and any drugs, such as THC, marijuana, and other forms of cannabis.) If you need to stop any medicine before surgery, ask when to start taking it again.  This is for your safety. Many medicines and drugs can make you bleed too much during surgery. Some change how well surgery (anesthesia) drugs work.  Call your insurance company to let them know you're having surgery. (If you don't have insurance, call [phone].)  Call your clinic if there's any change in your health. This includes a scrape or scratch near the surgery site, or any signs of a cold (sore throat, runny nose, cough, rash, fever).  Eating and drinking guidelines  For your safety: Unless your surgeon tells you otherwise, follow the guidelines below.  Eat and drink as normal until 8 hours before you arrive for surgery. After that, no food or milk. You can spit out gum when you arrive.  Drink clear liquids until 2 hours before you arrive. These are liquids you can see through, like water, Gatorade, and Propel Water. They also include plain black coffee and tea (no cream or milk).  No alcohol for 24 hours before you arrive. The night before surgery, stop any drinks that contain THC.  If your care team tells you to take medicine on the morning of surgery, it's okay to take it with a sip of water. No other medicines or drugs are allowed (including CBD oil)--follow your care team's instructions.  If you have questions the day of surgery, call your hospital or surgery center.   Preventing infection  Shower or bathe the night before and the morning of surgery. Follow the instructions your clinic gave you. (If no  instructions, use regular soap.)  Don't shave or clip hair near your surgery site. We'll remove the hair if needed.  Don't smoke or vape the morning of surgery. No chewing tobacco for 6 hours before you arrive. A nicotine patch is okay. You may spit out nicotine gum when you arrive.  For some surgeries, the surgeon will tell you to fully quit smoking and nicotine.  We will make every effort to keep you safe from infection. We will:  Clean our hands often with soap and water (or an alcohol-based hand rub).  Clean the skin at your surgery site with a special soap that kills germs.  Give you a special gown to keep you warm. (Cold raises the risk of infection.)  Wear hair covers, masks, gowns, and gloves during surgery.  Give antibiotic medicine, if prescribed. Not all surgeries need this medicine.  What to bring on the day of surgery  Photo ID and insurance card  Copy of your health care directive, if you have one  Glasses and hearing aids (bring cases)  You can't wear contacts during surgery  Inhaler and eye drops, if you use them (tell us about these when you arrive)  CPAP machine or breathing device, if you use them  A few personal items, if spending the night  If you have . . .  A pacemaker, ICD (cardiac defibrillator), or other implant: Bring the ID card.  An implanted stimulator: Bring the remote control.  A legal guardian: Bring a copy of the certified (court-stamped) guardianship papers.  Please remove any jewelry, including body piercings. Leave jewelry and other valuables at home.  If you're going home the day of surgery  You must have a support person drive you home. They should stay with you overnight, and they may need to help with your self-care.  If you don't have a support person, please tells us as soon as possible. We can help.  After surgery  If it's hard to control your pain or you need more pain medicine, please call your surgeon's office.  Questions?   If you have any questions for your care team,  list them here:   ____________________________________________________________________________________________________________________________________________________________________________________________________________________________________________________________  For informational purposes only. Not to replace the advice of your health care provider. Copyright   2003, 2019 [facility]. All rights reserved. Clinically reviewed by Ken Handy MD. [employer] [id number] - REV 02/25.

## 2025-06-18 ENCOUNTER — RESULTS FOLLOW-UP (OUTPATIENT)
Dept: DERMATOLOGY | Facility: CLINIC | Age: 70
End: 2025-06-18

## 2025-06-19 ENCOUNTER — RESULTS FOLLOW-UP (OUTPATIENT)
Dept: FAMILY MEDICINE | Facility: CLINIC | Age: 70
End: 2025-06-19

## 2025-06-19 LAB — GGT SERPL-CCNC: 108 U/L (ref 8–61)

## 2025-06-23 ENCOUNTER — ANESTHESIA EVENT (OUTPATIENT)
Dept: SURGERY | Facility: CLINIC | Age: 70
End: 2025-06-23
Payer: COMMERCIAL

## 2025-06-23 ENCOUNTER — HOSPITAL ENCOUNTER (OUTPATIENT)
Facility: CLINIC | Age: 70
Discharge: HOME | End: 2025-06-23
Attending: UROLOGY | Admitting: UROLOGY
Payer: COMMERCIAL

## 2025-06-23 ENCOUNTER — ANESTHESIA (OUTPATIENT)
Dept: SURGERY | Facility: CLINIC | Age: 70
End: 2025-06-23
Payer: COMMERCIAL

## 2025-06-23 VITALS
HEART RATE: 68 BPM | TEMPERATURE: 97.4 F | HEIGHT: 68 IN | DIASTOLIC BLOOD PRESSURE: 86 MMHG | RESPIRATION RATE: 18 BRPM | OXYGEN SATURATION: 98 % | BODY MASS INDEX: 18.54 KG/M2 | SYSTOLIC BLOOD PRESSURE: 153 MMHG | WEIGHT: 122.36 LBS

## 2025-06-23 DIAGNOSIS — N20.0 NEPHROLITHIASIS: Primary | ICD-10-CM

## 2025-06-23 PROCEDURE — 258N000003 HC RX IP 258 OP 636

## 2025-06-23 PROCEDURE — 250N000011 HC RX IP 250 OP 636: Performed by: UROLOGY

## 2025-06-23 PROCEDURE — 250N000009 HC RX 250: Performed by: NURSE ANESTHETIST, CERTIFIED REGISTERED

## 2025-06-23 PROCEDURE — 250N000011 HC RX IP 250 OP 636: Performed by: NURSE ANESTHETIST, CERTIFIED REGISTERED

## 2025-06-23 PROCEDURE — 250N000025 HC SEVOFLURANE, PER MIN: Performed by: UROLOGY

## 2025-06-23 PROCEDURE — 710N000012 HC RECOVERY PHASE 2, PER MINUTE: Performed by: UROLOGY

## 2025-06-23 PROCEDURE — 370N000017 HC ANESTHESIA TECHNICAL FEE, PER MIN: Performed by: UROLOGY

## 2025-06-23 PROCEDURE — 258N000003 HC RX IP 258 OP 636: Performed by: NURSE ANESTHETIST, CERTIFIED REGISTERED

## 2025-06-23 PROCEDURE — 999N000141 HC STATISTIC PRE-PROCEDURE NURSING ASSESSMENT: Performed by: UROLOGY

## 2025-06-23 PROCEDURE — 360N000084 HC SURGERY LEVEL 4 W/ FLUORO, PER MIN: Performed by: UROLOGY

## 2025-06-23 PROCEDURE — 710N000010 HC RECOVERY PHASE 1, LEVEL 2, PER MIN: Performed by: UROLOGY

## 2025-06-23 PROCEDURE — 250N000013 HC RX MED GY IP 250 OP 250 PS 637: Performed by: UROLOGY

## 2025-06-23 PROCEDURE — 250N000011 HC RX IP 250 OP 636

## 2025-06-23 RX ORDER — FENTANYL CITRATE 50 UG/ML
50 INJECTION, SOLUTION INTRAMUSCULAR; INTRAVENOUS EVERY 5 MIN PRN
Status: DISCONTINUED | OUTPATIENT
Start: 2025-06-23 | End: 2025-06-23 | Stop reason: HOSPADM

## 2025-06-23 RX ORDER — HYDROMORPHONE HCL IN WATER/PF 6 MG/30 ML
0.2 PATIENT CONTROLLED ANALGESIA SYRINGE INTRAVENOUS EVERY 5 MIN PRN
Status: DISCONTINUED | OUTPATIENT
Start: 2025-06-23 | End: 2025-06-23 | Stop reason: HOSPADM

## 2025-06-23 RX ORDER — ONDANSETRON 4 MG/1
4 TABLET, ORALLY DISINTEGRATING ORAL EVERY 30 MIN PRN
Status: DISCONTINUED | OUTPATIENT
Start: 2025-06-23 | End: 2025-06-23 | Stop reason: HOSPADM

## 2025-06-23 RX ORDER — ACETAMINOPHEN 650 MG/1
650 SUPPOSITORY RECTAL ONCE
Status: COMPLETED | OUTPATIENT
Start: 2025-06-23 | End: 2025-06-23

## 2025-06-23 RX ORDER — ACETAMINOPHEN 325 MG/1
975 TABLET ORAL ONCE
Status: COMPLETED | OUTPATIENT
Start: 2025-06-23 | End: 2025-06-23

## 2025-06-23 RX ORDER — HYDROMORPHONE HCL IN WATER/PF 6 MG/30 ML
0.4 PATIENT CONTROLLED ANALGESIA SYRINGE INTRAVENOUS EVERY 5 MIN PRN
Status: DISCONTINUED | OUTPATIENT
Start: 2025-06-23 | End: 2025-06-23 | Stop reason: HOSPADM

## 2025-06-23 RX ORDER — DEXAMETHASONE SODIUM PHOSPHATE 4 MG/ML
4 INJECTION, SOLUTION INTRA-ARTICULAR; INTRALESIONAL; INTRAMUSCULAR; INTRAVENOUS; SOFT TISSUE
Status: DISCONTINUED | OUTPATIENT
Start: 2025-06-23 | End: 2025-06-23 | Stop reason: HOSPADM

## 2025-06-23 RX ORDER — DEXAMETHASONE SODIUM PHOSPHATE 4 MG/ML
INJECTION, SOLUTION INTRA-ARTICULAR; INTRALESIONAL; INTRAMUSCULAR; INTRAVENOUS; SOFT TISSUE PRN
Status: DISCONTINUED | OUTPATIENT
Start: 2025-06-23 | End: 2025-06-23

## 2025-06-23 RX ORDER — NALOXONE HYDROCHLORIDE 0.4 MG/ML
0.1 INJECTION, SOLUTION INTRAMUSCULAR; INTRAVENOUS; SUBCUTANEOUS
Status: DISCONTINUED | OUTPATIENT
Start: 2025-06-23 | End: 2025-06-23 | Stop reason: HOSPADM

## 2025-06-23 RX ORDER — SODIUM CHLORIDE, SODIUM LACTATE, POTASSIUM CHLORIDE, CALCIUM CHLORIDE 600; 310; 30; 20 MG/100ML; MG/100ML; MG/100ML; MG/100ML
INJECTION, SOLUTION INTRAVENOUS CONTINUOUS
Status: DISCONTINUED | OUTPATIENT
Start: 2025-06-23 | End: 2025-06-23 | Stop reason: HOSPADM

## 2025-06-23 RX ORDER — OXYCODONE HYDROCHLORIDE 5 MG/1
5 TABLET ORAL
Status: DISCONTINUED | OUTPATIENT
Start: 2025-06-23 | End: 2025-06-23 | Stop reason: HOSPADM

## 2025-06-23 RX ORDER — LIDOCAINE HYDROCHLORIDE 20 MG/ML
INJECTION, SOLUTION INFILTRATION; PERINEURAL PRN
Status: DISCONTINUED | OUTPATIENT
Start: 2025-06-23 | End: 2025-06-23

## 2025-06-23 RX ORDER — SODIUM CHLORIDE, SODIUM LACTATE, POTASSIUM CHLORIDE, CALCIUM CHLORIDE 600; 310; 30; 20 MG/100ML; MG/100ML; MG/100ML; MG/100ML
INJECTION, SOLUTION INTRAVENOUS CONTINUOUS PRN
Status: DISCONTINUED | OUTPATIENT
Start: 2025-06-23 | End: 2025-06-23

## 2025-06-23 RX ORDER — ACETAMINOPHEN 325 MG/1
975 TABLET ORAL ONCE
Status: DISCONTINUED | OUTPATIENT
Start: 2025-06-23 | End: 2025-06-23 | Stop reason: HOSPADM

## 2025-06-23 RX ORDER — CEFAZOLIN SODIUM/WATER 2 G/20 ML
2 SYRINGE (ML) INTRAVENOUS SEE ADMIN INSTRUCTIONS
Status: DISCONTINUED | OUTPATIENT
Start: 2025-06-23 | End: 2025-06-23 | Stop reason: HOSPADM

## 2025-06-23 RX ORDER — OXYCODONE HYDROCHLORIDE 5 MG/1
5 TABLET ORAL EVERY 6 HOURS PRN
Qty: 5 TABLET | Refills: 0 | Status: SHIPPED | OUTPATIENT
Start: 2025-06-23 | End: 2025-06-26

## 2025-06-23 RX ORDER — HYDRALAZINE HYDROCHLORIDE 20 MG/ML
2.5-5 INJECTION INTRAMUSCULAR; INTRAVENOUS EVERY 10 MIN PRN
Status: DISCONTINUED | OUTPATIENT
Start: 2025-06-23 | End: 2025-06-23 | Stop reason: HOSPADM

## 2025-06-23 RX ORDER — ONDANSETRON 2 MG/ML
INJECTION INTRAMUSCULAR; INTRAVENOUS PRN
Status: DISCONTINUED | OUTPATIENT
Start: 2025-06-23 | End: 2025-06-23

## 2025-06-23 RX ORDER — FENTANYL CITRATE 50 UG/ML
25 INJECTION, SOLUTION INTRAMUSCULAR; INTRAVENOUS EVERY 5 MIN PRN
Status: DISCONTINUED | OUTPATIENT
Start: 2025-06-23 | End: 2025-06-23 | Stop reason: HOSPADM

## 2025-06-23 RX ORDER — ONDANSETRON 2 MG/ML
4 INJECTION INTRAMUSCULAR; INTRAVENOUS EVERY 30 MIN PRN
Status: DISCONTINUED | OUTPATIENT
Start: 2025-06-23 | End: 2025-06-23 | Stop reason: HOSPADM

## 2025-06-23 RX ORDER — FENTANYL CITRATE 50 UG/ML
INJECTION, SOLUTION INTRAMUSCULAR; INTRAVENOUS PRN
Status: DISCONTINUED | OUTPATIENT
Start: 2025-06-23 | End: 2025-06-23

## 2025-06-23 RX ORDER — PROPOFOL 10 MG/ML
INJECTION, EMULSION INTRAVENOUS PRN
Status: DISCONTINUED | OUTPATIENT
Start: 2025-06-23 | End: 2025-06-23

## 2025-06-23 RX ORDER — OXYCODONE HYDROCHLORIDE 10 MG/1
10 TABLET ORAL
Status: DISCONTINUED | OUTPATIENT
Start: 2025-06-23 | End: 2025-06-23 | Stop reason: HOSPADM

## 2025-06-23 RX ORDER — TAMSULOSIN HYDROCHLORIDE 0.4 MG/1
0.4 CAPSULE ORAL DAILY
Qty: 30 CAPSULE | Refills: 0 | Status: SHIPPED | OUTPATIENT
Start: 2025-06-23

## 2025-06-23 RX ORDER — CEFAZOLIN SODIUM/WATER 2 G/20 ML
2 SYRINGE (ML) INTRAVENOUS
Status: COMPLETED | OUTPATIENT
Start: 2025-06-23 | End: 2025-06-23

## 2025-06-23 RX ORDER — LABETALOL HYDROCHLORIDE 5 MG/ML
10 INJECTION, SOLUTION INTRAVENOUS
Status: DISCONTINUED | OUTPATIENT
Start: 2025-06-23 | End: 2025-06-23 | Stop reason: HOSPADM

## 2025-06-23 RX ADMIN — FENTANYL CITRATE 25 MCG: 50 INJECTION INTRAMUSCULAR; INTRAVENOUS at 08:10

## 2025-06-23 RX ADMIN — PROPOFOL 130 MG: 10 INJECTION, EMULSION INTRAVENOUS at 07:46

## 2025-06-23 RX ADMIN — Medication 2 G: at 07:53

## 2025-06-23 RX ADMIN — SODIUM CHLORIDE, SODIUM LACTATE, POTASSIUM CHLORIDE, AND CALCIUM CHLORIDE: .6; .31; .03; .02 INJECTION, SOLUTION INTRAVENOUS at 09:33

## 2025-06-23 RX ADMIN — FENTANYL CITRATE 25 MCG: 50 INJECTION, SOLUTION INTRAMUSCULAR; INTRAVENOUS at 09:20

## 2025-06-23 RX ADMIN — ONDANSETRON 4 MG: 2 INJECTION INTRAMUSCULAR; INTRAVENOUS at 08:30

## 2025-06-23 RX ADMIN — FENTANYL CITRATE 25 MCG: 50 INJECTION INTRAMUSCULAR; INTRAVENOUS at 08:13

## 2025-06-23 RX ADMIN — DEXAMETHASONE SODIUM PHOSPHATE 4 MG: 4 INJECTION, SOLUTION INTRAMUSCULAR; INTRAVENOUS at 07:54

## 2025-06-23 RX ADMIN — PHENYLEPHRINE HYDROCHLORIDE 100 MCG: 10 INJECTION INTRAVENOUS at 07:47

## 2025-06-23 RX ADMIN — ACETAMINOPHEN 975 MG: 325 TABLET ORAL at 06:49

## 2025-06-23 RX ADMIN — FENTANYL CITRATE 25 MCG: 50 INJECTION, SOLUTION INTRAMUSCULAR; INTRAVENOUS at 09:11

## 2025-06-23 RX ADMIN — LIDOCAINE HYDROCHLORIDE 60 MG: 20 INJECTION, SOLUTION INFILTRATION; PERINEURAL at 07:46

## 2025-06-23 RX ADMIN — MIDAZOLAM 2 MG: 1 INJECTION INTRAMUSCULAR; INTRAVENOUS at 07:30

## 2025-06-23 RX ADMIN — FENTANYL CITRATE 50 MCG: 50 INJECTION INTRAMUSCULAR; INTRAVENOUS at 07:54

## 2025-06-23 RX ADMIN — SODIUM CHLORIDE, SODIUM LACTATE, POTASSIUM CHLORIDE, AND CALCIUM CHLORIDE: .6; .31; .03; .02 INJECTION, SOLUTION INTRAVENOUS at 07:32

## 2025-06-23 ASSESSMENT — ACTIVITIES OF DAILY LIVING (ADL)
ADLS_ACUITY_SCORE: 41

## 2025-06-23 NOTE — ANESTHESIA POSTPROCEDURE EVALUATION
Patient: Marvin Elaine    Procedure: Procedure(s):  LITHOTRIPSY, EXTRACORPOREAL SHOCK WAVE (ESWL)       Anesthesia Type:  General    Note:  Disposition: Outpatient   Postop Pain Control: Uneventful            Sign Out: Well controlled pain   PONV: No   Neuro/Psych: Uneventful            Sign Out: Acceptable/Baseline neuro status   Airway/Respiratory: Uneventful            Sign Out: Acceptable/Baseline resp. status   CV/Hemodynamics: Uneventful            Sign Out: Acceptable CV status; No obvious hypovolemia; No obvious fluid overload   Other NRE: NONE   DID A NON-ROUTINE EVENT OCCUR? No           Last vitals:  Vitals Value Taken Time   /86 06/23/25 09:30   Temp 36.8  C (98.2  F) 06/23/25 08:45   Pulse 70 06/23/25 09:36   Resp 12 06/23/25 09:36   SpO2 95 % 06/23/25 09:36   Vitals shown include unfiled device data.    Electronically Signed By: Almas Monte MD  June 23, 2025  9:36 AM

## 2025-06-23 NOTE — ANESTHESIA PROCEDURE NOTES
Airway       Patient location during procedure: OR  Staff -        Anesthesiologist:  Harpal Raman MD       CRNA: Darren Urias APRN CRNA       Performed By: anesthesiologist and CRNA  Consent for Airway        Urgency: elective  Indications and Patient Condition       Indications for airway management: mily-procedural       Induction type:intravenous       Mask difficulty assessment: 1 - vent by mask    Final Airway Details       Final airway type: supraglottic airway    Supraglottic Airway Details        Type: LMA       Brand: LMA Unique       LMA size: 5    Post intubation assessment        Placement verified by: capnometry, equal breath sounds and chest rise        Number of attempts at approach: 1       Number of other approaches attempted: 0       Secured with: tape       Ease of procedure: easy       Dentition: Intact and Unchanged         [Nausea] : nausea [Diarrhea] : diarrhea [Poor Libido] : poor libido [Headache] : headache [Anxiety] : anxiety [Negative] : Heme/Lymph [Recent Change In Weight] : ~T no recent weight change [Redness] : no redness [Hearing Loss] : no hearing loss [Nosebleed] : no nosebleeds [Sore Throat] : no sore throat [Postnasal Drip] : no postnasal drip [Palpitations] : no palpitations [Leg Claudication] : no leg claudication [Paroxysmal Nocturnal Dyspnea] : no paroxysmal nocturnal dyspnea [Wheezing] : no wheezing [Cough] : no cough [Abdominal Pain] : no abdominal pain [Vomiting] : no vomiting [Heartburn] : no heartburn [Vaginal Discharge] : no vaginal discharge [Dysmenorrhea] : no dysmenorrhea [Suicidal] : not suicidal

## 2025-06-23 NOTE — ANESTHESIA CARE TRANSFER NOTE
Patient: Marvin Elaine    Procedure: Procedure(s):  LITHOTRIPSY, EXTRACORPOREAL SHOCK WAVE (ESWL)       Diagnosis: Nephrolithiasis [N20.0]  Diagnosis Additional Information: No value filed.    Anesthesia Type:   General     Note:    Oropharynx: oropharynx clear of all foreign objects  Level of Consciousness: awake  Oxygen Supplementation: nasal cannula  Level of Supplemental Oxygen (L/min / FiO2): 2  Independent Airway: airway patency satisfactory and stable  Dentition: dentition unchanged  Vital Signs Stable: post-procedure vital signs reviewed and stable  Report to RN Given: handoff report given  Patient transferred to: PACU    Handoff Report: Identifed the Patient, Identified the Reponsible Provider, Reviewed the pertinent medical history, Discussed the surgical course, Reviewed Intra-OP anesthesia mangement and issues during anesthesia, Set expectations for post-procedure period and Allowed opportunity for questions and acknowledgement of understanding      Vitals:  Vitals Value Taken Time   /94 06/23/25 08:45   Temp 36.3    Pulse 77 06/23/25 08:51   Resp 19 06/23/25 08:51   SpO2 100 % 06/23/25 08:51   Vitals shown include unfiled device data.    Electronically Signed By: CONCEPCION Braswell CRNA  June 23, 2025  8:51 AM

## 2025-06-23 NOTE — OP NOTE
PREOPERATIVE DIAGNOSIS: Left kidney stone  POSTOPERATIVE DIAGNOSIS: Same  PROCEDURE: Extracorporeal shockwave lithotripsy of kidney stone  INDICATIONS: Marvin Elaine is a 69 year old male who was found to have a non-obstructing left kidney stone.  He was evaluated and after discussion of options (observation, ureteroscopy with laser lithotripsy, and extracorporeal shockwave lithtripsy (ESWL). He decided on pursuing extracorporeal shockwave lithtripsy. The risks include but are not limited to heart attack, stroke, blood clot to the lungs, death, bleeding, injury to organs surrounding the kidney, injury to the kidney, or death.    SURGEON: Bhavin Hung MD   RESIDENT SURGEON: Urszula Morelos MD   ESTIMATED BLOOD LOSS: Zero.   COMPLICATIONS: None.   DRAINS AND TUBES: None.   FINDINGS: No visible residual stone on X-ray after delivery of 2500 shocks.   DETAILS OF PROCEDURE:   The patient was properly identified in preoperative area. He was brought to the operating room, placed in supine position. General anesthesia was induced. A timeout was taken. He  was positioned over the fluoroscopy unit of the shockwave table in supine position. We were able to see the stone well. The extracorporeal shockwave unit was then brought in and we delivered 2500 shocks to the visualized stone.  We completed a 2 minute pause after 200 shocks were delivered. We did recheck positioning of the lithotriptor fluoroscopically a number of times during the procedure.   FOLLOW-UP PLAN:  He will plan to follow-up in clinic with xray in 1 month    Urszula Morelos MD  Urology Resident, PGY2

## 2025-06-23 NOTE — ANESTHESIA PREPROCEDURE EVALUATION
Anesthesia Pre-Procedure Evaluation    Patient: Marvin Elaine   MRN: [id number] : 1955          Procedure : Procedure(s):  LITHOTRIPSY, EXTRACORPOREAL SHOCK WAVE (ESWL)         Past Medical History:   Diagnosis Date    Anxiety     Depressive disorder     Prostate infection       Past Surgical History:   Procedure Laterality Date    COLONOSCOPY      COLONOSCOPY N/A 3/7/2023    Procedure: COLONOSCOPY;  Surgeon: Bozena Franco MD;  Location: [facility] OR    EYE SURGERY      LAPAROSCOPIC APPENDECTOMY  2012    Procedure:LAPAROSCOPIC APPENDECTOMY; Open Appendectomy; Surgeon:GERDA GASTON; Location:[facility] OR      No Known Allergies   Social History     Tobacco Use    Smoking status: Never    Smokeless tobacco: Never   Substance Use Topics    Alcohol use: No      Wt Readings from Last 1 Encounters:   25 54.9 kg (121 lb 1.6 oz)        Anesthesia Evaluation   Pt has had prior anesthetic.     No history of anesthetic complications       ROS/MED HX  ENT/Pulmonary:       Neurologic:  - neg neurologic ROS     Cardiovascular:       METS/Exercise Tolerance: 4 - Raking leaves, gardening    Hematologic:       Musculoskeletal:       GI/Hepatic:  - neg GI/hepatic ROS     Renal/Genitourinary:     (+)       Nephrolithiasis ,       Endo:       Psychiatric/Substance Use:       Infectious Disease:  - neg infectious disease ROS     Malignancy:   (+) Malignancy, History of Skin.Skin CA Remission status post Surgery.      Other:              Physical Exam  Airway  Mallampati: III  TM distance: >3 FB  Neck ROM: full  Mouth opening: >= 4 cm    Cardiovascular   Rhythm: regular  Rate: normal rate     Dental   (+) Modest Abnormalities - crowns, retainers, 1 or 2 missing teeth      Pulmonary       Neurological   He appears awake, alert and oriented x3.    Other Findings       OUTSIDE LABS:  CBC:   Lab Results   Component Value Date    WBC 5.2 2025    WBC 7.0 2019    HGB 14.0  "06/17/2025    HGB 14.4 01/16/2019    HCT 41.7 06/17/2025    HCT 43.0 01/16/2019     06/17/2025     01/16/2019     BMP:   Lab Results   Component Value Date     06/17/2025     03/04/2025    POTASSIUM 4.3 06/17/2025    POTASSIUM 5.0 03/04/2025    CHLORIDE 101 06/17/2025    CHLORIDE 102 03/04/2025    CO2 27 06/17/2025    CO2 29 03/04/2025    BUN 18.2 06/17/2025    BUN 22.6 03/04/2025    CR 1.04 06/17/2025    CR 1.04 03/04/2025    GLC 94 06/17/2025     (H) 03/04/2025     COAGS: No results found for: \"PTT\", \"INR\", \"FIBR\"  POC: No results found for: \"BGM\", \"HCG\", \"HCGS\"  HEPATIC:   Lab Results   Component Value Date    ALBUMIN 4.1 06/17/2025    PROTTOTAL 6.6 06/17/2025    ALT 62 06/17/2025    AST 41 06/17/2025     (H) 06/17/2025    ALKPHOS 283 (H) 06/17/2025    BILITOTAL 0.7 06/17/2025     OTHER:   Lab Results   Component Value Date    CHUCK 9.2 06/17/2025    MAG 2.2 03/04/2025    LIPASE 127 10/13/2017    TSH 1.24 03/04/2025       Anesthesia Plan    ASA Status:  2      NPO Status: NPO Appropriate   Anesthesia Type: General.  Airway: supraglottic airway.  Induction: intravenous.   Techniques and Equipment:     - Airway:  Planned airway equipment includes supraglottic airway.     - Monitoring Plan: standard ASA monitoring, processed EEG monitor     Consents    Anesthesia Plan(s) and associated risks, benefits, and realistic alternatives discussed. Questions answered and patient/representative(s) expressed understanding.     - Discussed:     - Discussed with:  Patient          Blood Consent:      - Discussed with: not discussed.     Postoperative Care    Pain management: multimodal analgesia.     Comments:                   Harpal Raman MD    I have reviewed the pertinent notes and labs in the chart from the past 30 days and (re)examined the patient.  Any updates or changes from those notes are reflected in this note.    Clinically Significant Risk Factors Present on Admission    "

## 2025-06-25 ENCOUNTER — APPOINTMENT (OUTPATIENT)
Dept: CT IMAGING | Facility: CLINIC | Age: 70
DRG: 661 | End: 2025-06-25
Attending: EMERGENCY MEDICINE
Payer: COMMERCIAL

## 2025-06-25 ENCOUNTER — HOSPITAL ENCOUNTER (INPATIENT)
Facility: CLINIC | Age: 70
DRG: 661 | End: 2025-06-25
Attending: EMERGENCY MEDICINE | Admitting: UROLOGY
Payer: COMMERCIAL

## 2025-06-25 PROBLEM — N20.0 KIDNEY STONE: Status: ACTIVE | Noted: 2025-06-25

## 2025-06-25 ASSESSMENT — ACTIVITIES OF DAILY LIVING (ADL)
ADLS_ACUITY_SCORE: 41
ADLS_ACUITY_SCORE: 44
ADLS_ACUITY_SCORE: 41
ADLS_ACUITY_SCORE: 44
ADLS_ACUITY_SCORE: 44
ADLS_ACUITY_SCORE: 48
ADLS_ACUITY_SCORE: 41
ADLS_ACUITY_SCORE: 44
ADLS_ACUITY_SCORE: 41
ADLS_ACUITY_SCORE: 44
ADLS_ACUITY_SCORE: 41

## 2025-06-25 ASSESSMENT — COLUMBIA-SUICIDE SEVERITY RATING SCALE - C-SSRS
1. IN THE PAST MONTH, HAVE YOU WISHED YOU WERE DEAD OR WISHED YOU COULD GO TO SLEEP AND NOT WAKE UP?: NO
6. HAVE YOU EVER DONE ANYTHING, STARTED TO DO ANYTHING, OR PREPARED TO DO ANYTHING TO END YOUR LIFE?: NO
2. HAVE YOU ACTUALLY HAD ANY THOUGHTS OF KILLING YOURSELF IN THE PAST MONTH?: NO

## 2025-06-25 NOTE — ED TRIAGE NOTES
BIBA from home with complaints of pain related to kidney stones, two days ago had lithotripsy to attempt to break up kidney stones. Pt took oxycodone at home with no relief. Pain 10/10, EMS gave 100 mcg of Fent, pain now 7/10. Slightly hypertensive, otherwise vitally stable

## 2025-06-25 NOTE — MEDICATION SCRIBE - ADMISSION MEDICATION HISTORY
Medication Scribe Admission Medication History    Admission medication history is complete. The information provided in this note is only as accurate as the sources available at the time of the update.    Information Source(s): Patient via in-person    Pertinent Information: Spoke with patient in person and completed medication hx.      Changes made to PTA medication list:  Added: Albuterol 108 (90 Base) MCG/ACT Inhaler          Alprazolam 0.25 MG Tab          Atorvastatin 40 MG Tab          Hydroxyzine 25 MG Tab          Ketoconazole 2% External Shampoo          Mupirocin 2% External Ointment           Oxycodone 5 MG Tab          Paroxetine 40 MG Tab             Tamsulosin 0.4 MG Cap          Triamcinolone 0.25% Cream   Deleted: None  Changed: None    Allergies reviewed with patient and updates made in EHR: no    Medication History Completed By: Farida Suazo 6/25/2025 12:33 PM    PTA Med List   Medication Sig Last Dose/Taking    albuterol (PROAIR HFA/PROVENTIL HFA/VENTOLIN HFA) 108 (90 Base) MCG/ACT inhaler Inhale 1-2 puffs into the lungs every 4 hours as needed for shortness of breath. More than a month    ALPRAZolam (XANAX) 0.25 MG tablet Take 0.5 mg by mouth as needed. Past Week    atorvastatin (LIPITOR) 40 MG tablet Take 40 mg by mouth at bedtime. 6/24/2025 Bedtime    hydrOXYzine HCl (ATARAX) 25 MG tablet Take 25 mg by mouth 3 times daily as needed. Past Week    ketoconazole (NIZORAL) 2 % external shampoo Apply topically twice a week. Past Week    mupirocin (BACTROBAN) 2 % external ointment Apply topically 2-3 times weekly as needed. Past Week    oxyCODONE (ROXICODONE) 5 MG tablet Take 5 mg by mouth every 6 hours as needed. Past Week    PARoxetine (PAXIL) 40 MG tablet Take 40 mg by mouth at bedtime. 6/24/2025 Bedtime    tamsulosin (FLOMAX) 0.4 MG capsule Take 0.4 mg by mouth daily. 6/24/2025    triamcinolone (KENALOG) 0.025 % cream Apply topically as needed. Past Week

## 2025-06-25 NOTE — CONSULTS
Urology Consult History and Physical    Name: Adi Lundberg    MRN: [id number]   YOB: 1955                     Chief Complaint:   We were asked to see Adi Lundberg at the request of Dr. Garcia for evaluation and treatment of the following chief complaint:  Left flank pain and LLQ pain after lithotripsy    History is obtained from chart review and patient report          History of Present Illness:   Adi Lundberg is a 69 year old male with a non-obstructing left kidney stone s/p extracorporeal shockwave lithotripsy by Dr Waldrop on 6/23/25. He was doing well after surgery but he started to have worsening left flank pain and LLQ pain last night, no improvement with oxycodone.    VSS. No  symptoms. PVR 0 today.     UA shows negative nitrites, trace leuks, >182 RBC, 9 WBC. Urine culture not indicated.  Cr 1.37 (baseline 1.04)  Serum WBC 11.6  CT shows a stone in the bladder and multiple left kidney stones w/ mild left hydronephrosis.         Past Medical History:   No past medical history on file.         Past Surgical History:   No past surgical history on file.         Social History:     Social History     Tobacco Use    Smoking status: Not on file    Smokeless tobacco: Not on file   Substance Use Topics    Alcohol use: Not on file            Family History:   No family history on file.         Allergies:   No Known Allergies         Medications:     Current Facility-Administered Medications   Medication Dose Route Frequency Provider Last Rate Last Admin    HYDROmorphone (PF) (DILAUDID) injection 0.5 mg  0.5 mg Intravenous Q30 Min PRN Lew Garcia MD         No current outpatient medications on file.             Review of Systems:    ROS: See HPI for pertinent details.  Remainder of 10-point ROS negative.   REVIEW OF SYSTEMS:  Skin: negative  Eyes: negative  Ears/Nose/Throat: negative  Respiratory: No shortness of breath, dyspnea on exertion, cough, or hemoptysis  Cardiovascular:  "negative  Gastrointestinal: negative  Genitourinary: as above  Musculoskeletal: negative  Neurologic: negative  Psychiatric: negative  Hematologic/Lymphatic/Immunologic: negative  Endocrine: negative           Physical Exam:   VS:  T: 98    HR: 75    BP: 180/89    RR: 18   GEN:  AOx3.  NAD.  Anxious about pain.  HEENT:  Sclerae anicteric.  Conjunctivae pink.  Moist mucous membranes  NECK:  Supple.  No lymphadenopathy.  CV:  RRR  LUNGS: Non-labored breathing.  BACK:  left costoverterbral tenderness.  ABD:  Soft.  NT.  ND.  No rebound or guarding.  No surgical scars. No masses.    EXT:  Warm, well perfused.    SKIN:  Warm.  Dry.  No rashes.  NEURO:  CN grossly intact.                Data:   All laboratory data reviewed:    No results found for: \"PSA\"  Recent Labs   Lab 06/25/25  0511   WBC 11.6*   HGB 12.8*          Recent Labs   Lab 06/25/25  0511      POTASSIUM 4.0   CHLORIDE 105   CO2 24   BUN 31.6*   CR 1.37*   *   [place] 9.1       Recent Labs   Lab 06/25/25  0725   COLOR Orange*   APPEARANCE Slightly Cloudy*   URINEGLC Negative   URINEBILI Negative   URINEKETONE Negative   SG 1.020   URINEPH 6.0   PROTEIN 10*   NITRITE Negative   LEUKEST Trace*   RBCU >182*   WBCU 9*     No results found for this or any previous visit.    All pertinent imaging reviewed:  Today  IMPRESSION:   Mild left hydronephrosis/hydroureter with multiple obstructing stone  fragments in the distal left ureter, status post recent lithotripsy.  There is also a small stone fragments demonstrated within the bladder  and remaining stones/stone fragments in the left kidney.         Impression and Plan:   Impression / Plan:   Adi Lundberg is a 69 year old male with left kidney stone s/p left lithotripsy 2 days ago now at ER for LLQ pain and left flank pain not well controlled with PO oxycodone at home.  Urinalysis negative for UTI.   Cr mildly elevated.  PVR 0  CT shows mild left hydronephrosis with obstructing stone " fragments in the distal left ureter and a small stone in the bladder.    - continue pain management  - could try detrol as needed for bladder pain  - restart PTA flomax  - strain the urine  - admit to urology  - NPO at midnight  - if no improvement of symptoms, will plan for stent placement tomorrow        Urology will follow along.     Discussed with Dr. Waldrop    Thank you for the opportunity to participate in the care of Adi Lundberg.     Fern Bermudez NP  Department of Urology      Please call Job Code:   [phone] to reach the Urology resident or PA on call - Weekdays  [phone] to reach the Urology resident or PA on call - Weeknights and weekends

## 2025-06-25 NOTE — ED PROVIDER NOTES
ED Provider Note  [facility]      History     Chief Complaint   Patient presents with    Abdominal Pain     HPI  Adi Lundberg is a 69 year old male who   Presents for left-sided flank pain.  He has a history of kidney stones and on Monday of this week had lithotripsy performed.  He is been doing well in the postprocedure period until tonight.  Around bedtime he noticed the pain was creeping up and took some pain medication, oxycodone.  He woke from sleep with increasing pain in the left flank and left lower quadrant so took another oxycodone without improvement.  Has felt nauseated but not vomiting.  Blood pressure elevated on arrival.  Has been afebrile.  Has not noted urinary symptoms.    Past Medical History  No past medical history on file.  History reviewed. No pertinent surgical history.  No current outpatient medications on file.    No Known Allergies  Family History  History reviewed. No pertinent family history.  Social History       A medically appropriate review of systems was performed with pertinent positives and negatives noted in the HPI, and all other systems negative.    Physical Exam   BP: (!) 180/89  Pulse: 75  Temp: 98  F (36.7  C)  Resp: 18  SpO2: 99 %  Physical Exam  Gen:A&Ox3, uncomfortable  HEENT:PERRL, no facial tenderness or wounds, head atraumatic, oropharynx clear, mucous membranes moist, TMs clear bilaterally  Neck:no bony tenderness or step offs, no JVD, trachea midline  Back: left flank pain  CV:RRR without murmurs  PULM:Clear to auscultation bilaterally  Abd: Minimal LLQ tenderness without guarding or rebound tenderness  UE:No traumatic injuries, skin normal  LE:no traumatic injuries, skin normal, no LE edema  Neuro:CN II-XII intact, strength 5/5 throughout  Skin: no rashes or ecchymoses    ED Course, Procedures, & Data      Procedures    Results for orders placed or performed during the hospital encounter of 06/25/25   CT Abdomen Pelvis w/o Contrast     Impression    IMPRESSION:   Mild left hydronephrosis/hydroureter with multiple obstructing stone  fragments in the distal left ureter, status post recent lithotripsy.  There is also a small stone fragments demonstrated within the bladder  and remaining stones/stone fragments in the left kidney.    I have personally reviewed the examination and initial interpretation  and I agree with the findings.    TRAN YANG MD         SYSTEM ID:  [id number]   Comprehensive metabolic panel   Result Value Ref Range    Sodium 141 135 - 145 mmol/L    Potassium 4.0 3.4 - 5.3 mmol/L    Carbon Dioxide (CO2) 24 22 - 29 mmol/L    Anion Gap 12 7 - 15 mmol/L    Urea Nitrogen 31.6 (H) 8.0 - 23.0 mg/dL    Creatinine 1.37 (H) 0.67 - 1.17 mg/dL    GFR Estimate 55 (L) >60 mL/min/1.73m2    Calcium 9.1 8.8 - 10.4 mg/dL    Chloride 105 98 - 107 mmol/L    Glucose 104 (H) 70 - 99 mg/dL    Alkaline Phosphatase 241 (H) 40 - 150 U/L    AST 39 0 - 45 U/L    ALT 37 0 - 70 U/L    Protein Total 6.1 (L) 6.4 - 8.3 g/dL    Albumin 3.8 3.5 - 5.2 g/dL    Bilirubin Total 0.6 <=1.2 mg/dL   Result Value Ref Range    Lipase 64 (H) 13 - 60 U/L   UA with Microscopic reflex to Culture    Specimen: Urine, Clean Catch   Result Value Ref Range    Color Urine Orange (A) Colorless, Straw, Light Yellow, Yellow    Appearance Urine Slightly Cloudy (A) Clear    Glucose Urine Negative Negative mg/dL    Bilirubin Urine Negative Negative    Ketones Urine Negative Negative mg/dL    Specific Gravity Urine 1.020 1.003 - 1.035    Blood Urine Large (A) Negative    pH Urine 6.0 5.0 - 7.0    Protein Albumin Urine 10 (A) Negative mg/dL    Urobilinogen Urine Normal Normal mg/dL    Nitrite Urine Negative Negative    Leukocyte Esterase Urine Trace (A) Negative    Mucus Urine Present (A) None Seen /LPF    Calcium Oxalate Crystals Urine Few (A) None Seen /HPF    RBC Urine >182 (H) <=2 /HPF    WBC Urine 9 (H) <=5 /HPF    Squamous Epithelials Urine <1 <=1 /HPF    Transitional  Epithelials Urine <1 <=1 /HPF   CBC with platelets and differential   Result Value Ref Range    WBC Count 11.6 (H) 4.0 - 11.0 10e3/uL    RBC Count 4.22 (L) 4.40 - 5.90 10e6/uL    Hemoglobin 12.8 (L) 13.3 - 17.7 g/dL    Hematocrit 40.2 40.0 - 53.0 %    MCV 95 78 - 100 fL    MCH 30.3 26.5 - 33.0 pg    MCHC 31.8 31.5 - 36.5 g/dL    RDW 13.3 10.0 - 15.0 %    Platelet Count 164 150 - 450 10e3/uL    % Neutrophils 77 %    % Lymphocytes 11 %    % Monocytes 8 %    % Eosinophils 3 %    % Basophils 1 %    % Immature Granulocytes 1 %    NRBCs per 100 WBC 0 <1 /100    Absolute Neutrophils 9.0 (H) 1.6 - 8.3 10e3/uL    Absolute Lymphocytes 1.3 0.8 - 5.3 10e3/uL    Absolute Monocytes 0.9 0.0 - 1.3 10e3/uL    Absolute Eosinophils 0.3 0.0 - 0.7 10e3/uL    Absolute Basophils 0.1 0.0 - 0.2 10e3/uL    Absolute Immature Granulocytes 0.1 <=0.4 10e3/uL    Absolute NRBCs 0.0 10e3/uL   CBC with platelets   Result Value Ref Range    WBC Count 9.5 4.0 - 11.0 10e3/uL    RBC Count 3.84 (L) 4.40 - 5.90 10e6/uL    Hemoglobin 11.8 (L) 13.3 - 17.7 g/dL    Hematocrit 35.8 (L) 40.0 - 53.0 %    MCV 93 78 - 100 fL    MCH 30.7 26.5 - 33.0 pg    MCHC 33.0 31.5 - 36.5 g/dL    RDW 13.4 10.0 - 15.0 %    Platelet Count 140 (L) 150 - 450 10e3/uL     Medications   ondansetron (ZOFRAN) injection 4 mg (has no administration in time range)   tamsulosin (FLOMAX) capsule 0.4 mg (0.4 mg Oral $Given 6/26/25 0854)   tolterodine (DETROL) tablet 1 mg (1 mg Oral $Given 6/26/25 0858)   oxyCODONE (ROXICODONE) tablet 5 mg ( Oral See Alternative 6/26/25 0320)     Or   oxyCODONE IR (ROXICODONE) tablet 10 mg (10 mg Oral $Given 6/26/25 0320)   acetaminophen (TYLENOL) tablet 650 mg (650 mg Oral $Given 6/26/25 0643)   naloxone (NARCAN) injection 0.2 mg (has no administration in time range)     Or   naloxone (NARCAN) injection 0.4 mg (has no administration in time range)     Or   naloxone (NARCAN) injection 0.2 mg (has no administration in time range)     Or   naloxone (NARCAN)  injection 0.4 mg (has no administration in time range)   HYDROmorphone (PF) (DILAUDID) injection 0.3 mg (0.3 mg Intravenous $Given 6/26/25 0854)   sodium chloride 0.9 % infusion ( Intravenous $New Bag 6/26/25 1017)   ketorolac (TORADOL) injection 15 mg (15 mg Intravenous $Given 6/25/25 0543)   HYDROmorphone (PF) (DILAUDID) injection 0.5 mg (0.5 mg Intravenous $Given 6/25/25 0542)   HYDROmorphone (PF) (DILAUDID) injection 0.5 mg (0.5 mg Intravenous $Given 6/25/25 0834)   HYDROmorphone (PF) (DILAUDID) injection 0.5 mg (0.5 mg Intravenous $Given 6/25/25 1543)   morphine (PF) injection 4 mg (4 mg Intravenous $Given 6/25/25 0954)          Critical care was not performed.     Medical Decision Making  The patient's presentation was of high complexity (an acute health issue posing potential threat to life or bodily function).    The patient's evaluation involved:  ordering and/or review of 3+ test(s) in this encounter (see separate area of note for details)    The patient's management necessitated high risk (a parenteral controlled substance).    Assessment & Plan    69-year-old male with history of kidney stones presenting with recurrent left flank and left lower quadrant pain in setting of a recent lithotripsy.  Vitals notable hypertension, otherwise stable.  Differential includes recurrent ureteral lithiasis or other obstruction. Less likely UTI/pyelonephritis.    IV access obtained & laboratory testing included CBC, metabolic panel lipase and a urinalysis.  CT abdomen pelvis stone protocol ordered.   Treated with IV toradol and dilaudid for pain.   Anticipating Urology consult once imaging available.   Labs notable for WBC of 11.6. Cr increased to 1.37.     UA and CT pending at time of sign out to Dr. Bishop.     I have reviewed the nursing notes. I have reviewed the findings, diagnosis, plan and need for follow up with the patient.    Current Discharge Medication List          Final diagnoses:   Kidney stone        Nara Farah MD   [facility] EMERGENCY DEPARTMENT  6/25/2025     Nara Farah MD  06/26/25 1112

## 2025-06-25 NOTE — ED NOTES
[facility]  Expected date:   Expected time:   Means of arrival:   Comments:  [id number]  69m  Kidney stone pain  Yellow

## 2025-06-26 ENCOUNTER — APPOINTMENT (OUTPATIENT)
Dept: GENERAL RADIOLOGY | Facility: CLINIC | Age: 70
DRG: 661 | End: 2025-06-26
Attending: UROLOGY
Payer: COMMERCIAL

## 2025-06-26 ENCOUNTER — ANESTHESIA EVENT (OUTPATIENT)
Dept: SURGERY | Facility: CLINIC | Age: 70
End: 2025-06-26
Payer: COMMERCIAL

## 2025-06-26 ENCOUNTER — ANESTHESIA (OUTPATIENT)
Dept: SURGERY | Facility: CLINIC | Age: 70
End: 2025-06-26
Payer: COMMERCIAL

## 2025-06-26 PROCEDURE — 250N000011 HC RX IP 250 OP 636: Performed by: ANESTHESIOLOGY

## 2025-06-26 PROCEDURE — 250N000009 HC RX 250: Performed by: ANESTHESIOLOGY

## 2025-06-26 PROCEDURE — 258N000003 HC RX IP 258 OP 636: Performed by: ANESTHESIOLOGY

## 2025-06-26 PROCEDURE — 250N000011 HC RX IP 250 OP 636

## 2025-06-26 RX ORDER — DEXAMETHASONE SODIUM PHOSPHATE 4 MG/ML
INJECTION, SOLUTION INTRA-ARTICULAR; INTRALESIONAL; INTRAMUSCULAR; INTRAVENOUS; SOFT TISSUE PRN
Status: DISCONTINUED | OUTPATIENT
Start: 2025-06-26 | End: 2025-06-26

## 2025-06-26 RX ORDER — PROPOFOL 10 MG/ML
INJECTION, EMULSION INTRAVENOUS PRN
Status: DISCONTINUED | OUTPATIENT
Start: 2025-06-26 | End: 2025-06-26

## 2025-06-26 RX ORDER — ONDANSETRON 2 MG/ML
INJECTION INTRAMUSCULAR; INTRAVENOUS PRN
Status: DISCONTINUED | OUTPATIENT
Start: 2025-06-26 | End: 2025-06-26

## 2025-06-26 RX ORDER — ESMOLOL HYDROCHLORIDE 10 MG/ML
INJECTION INTRAVENOUS PRN
Status: DISCONTINUED | OUTPATIENT
Start: 2025-06-26 | End: 2025-06-26

## 2025-06-26 RX ORDER — SODIUM CHLORIDE, SODIUM LACTATE, POTASSIUM CHLORIDE, CALCIUM CHLORIDE 600; 310; 30; 20 MG/100ML; MG/100ML; MG/100ML; MG/100ML
INJECTION, SOLUTION INTRAVENOUS CONTINUOUS PRN
Status: DISCONTINUED | OUTPATIENT
Start: 2025-06-26 | End: 2025-06-26

## 2025-06-26 RX ORDER — LIDOCAINE HYDROCHLORIDE 20 MG/ML
INJECTION, SOLUTION INFILTRATION; PERINEURAL PRN
Status: DISCONTINUED | OUTPATIENT
Start: 2025-06-26 | End: 2025-06-26

## 2025-06-26 RX ORDER — FENTANYL CITRATE 50 UG/ML
INJECTION, SOLUTION INTRAMUSCULAR; INTRAVENOUS PRN
Status: DISCONTINUED | OUTPATIENT
Start: 2025-06-26 | End: 2025-06-26

## 2025-06-26 RX ADMIN — Medication 50 MG: at 13:32

## 2025-06-26 RX ADMIN — FENTANYL CITRATE 50 MCG: 50 INJECTION INTRAMUSCULAR; INTRAVENOUS at 14:16

## 2025-06-26 RX ADMIN — FENTANYL CITRATE 50 MCG: 50 INJECTION INTRAMUSCULAR; INTRAVENOUS at 14:39

## 2025-06-26 RX ADMIN — DEXAMETHASONE SODIUM PHOSPHATE 4 MG: 4 INJECTION, SOLUTION INTRAMUSCULAR; INTRAVENOUS at 13:41

## 2025-06-26 RX ADMIN — Medication 30 MG: at 13:51

## 2025-06-26 RX ADMIN — Medication 20 MG: at 14:47

## 2025-06-26 RX ADMIN — Medication 100 MG: at 15:16

## 2025-06-26 RX ADMIN — PROPOFOL 40 MG: 10 INJECTION, EMULSION INTRAVENOUS at 15:12

## 2025-06-26 RX ADMIN — ONDANSETRON 4 MG: 2 INJECTION INTRAMUSCULAR; INTRAVENOUS at 15:02

## 2025-06-26 RX ADMIN — PROPOFOL 120 MG: 10 INJECTION, EMULSION INTRAVENOUS at 13:32

## 2025-06-26 RX ADMIN — ESMOLOL HYDROCHLORIDE 50 MG: 10 INJECTION, SOLUTION INTRAVENOUS at 13:34

## 2025-06-26 RX ADMIN — ESMOLOL HYDROCHLORIDE 30 MG: 10 INJECTION, SOLUTION INTRAVENOUS at 13:37

## 2025-06-26 RX ADMIN — Medication 100 MG: at 15:10

## 2025-06-26 RX ADMIN — MIDAZOLAM 2 MG: 1 INJECTION INTRAMUSCULAR; INTRAVENOUS at 13:27

## 2025-06-26 RX ADMIN — LIDOCAINE HYDROCHLORIDE 40 MG: 20 INJECTION, SOLUTION INFILTRATION; PERINEURAL at 13:32

## 2025-06-26 RX ADMIN — Medication 2 G: at 13:33

## 2025-06-26 RX ADMIN — SODIUM CHLORIDE, SODIUM LACTATE, POTASSIUM CHLORIDE, AND CALCIUM CHLORIDE: .6; .31; .03; .02 INJECTION, SOLUTION INTRAVENOUS at 13:33

## 2025-06-26 ASSESSMENT — ACTIVITIES OF DAILY LIVING (ADL)
ADLS_ACUITY_SCORE: 29
ADLS_ACUITY_SCORE: 28
ADLS_ACUITY_SCORE: 29
ADLS_ACUITY_SCORE: 28
ADLS_ACUITY_SCORE: 29
ADLS_ACUITY_SCORE: 28
ADLS_ACUITY_SCORE: 48
ADLS_ACUITY_SCORE: 29
ADLS_ACUITY_SCORE: 29
ADLS_ACUITY_SCORE: 48
ADLS_ACUITY_SCORE: 29
ADLS_ACUITY_SCORE: 29
ADLS_ACUITY_SCORE: 48
ADLS_ACUITY_SCORE: 28
ADLS_ACUITY_SCORE: 28
ADLS_ACUITY_SCORE: 29

## 2025-06-26 NOTE — ANESTHESIA PROCEDURE NOTES
Airway       Patient location during procedure: OR       Procedure Start/Stop Times: 6/26/2025 1:35 PM  Staff -        Performed By: SRNAIndications and Patient Condition       Indications for airway management: deyanira-procedural       Induction type:intravenous       Mask difficulty assessment: 1 - vent by mask    Final Airway Details       Final airway type: endotracheal airway       Successful airway: ETT - single  Endotracheal Airway Details        ETT size (mm): 8.0       Cuffed: yes       Successful intubation technique: direct laryngoscopy       DL Blade Type: MAC 3       Grade View of Cords: 2       Adjucts: stylet       Position: Right       Measured from: gums/teeth       Secured at (cm): 24       Bite block used: None    Post intubation assessment        Placement verified by: capnometry, equal breath sounds and chest rise        Number of attempts at approach: 1       Secured with: tape       Ease of procedure: easy       Dentition: Intact and Unchanged    Medication(s) Administered   Medication Administration Time: 6/26/2025 1:35 PM

## 2025-06-26 NOTE — ANESTHESIA POSTPROCEDURE EVALUATION
Patient: Adi Lundberg    Procedure: Procedure(s):  Combined cystoscopy, retrogrades, left stent placement  Laser holmium lithotripsy ureter(s), insert stent, combined       Anesthesia Type:  General    Note:  Disposition: Inpatient   Postop Pain Control: Uneventful            Sign Out: Well controlled pain   PONV: No   Neuro/Psych: Uneventful            Sign Out: Acceptable/Baseline neuro status   Airway/Respiratory: Uneventful            Sign Out: Acceptable/Baseline resp. status   CV/Hemodynamics: Uneventful            Sign Out: Acceptable CV status; No obvious hypovolemia; No obvious fluid overload   Other NRE: NONE   DID A NON-ROUTINE EVENT OCCUR? No           Last vitals:  Vitals Value Taken Time   /82 06/26/25 15:46   Temp 36.7  C (98.1  F) 06/26/25 15:25   Pulse 90 06/26/25 15:52   Resp 28 06/26/25 15:52   SpO2 98 % 06/26/25 15:52   Vitals shown include unfiled device data.    Electronically Signed By: Steve Mackay MD  June 26, 2025  3:52 PM

## 2025-06-26 NOTE — PLAN OF CARE
"Goal Outcome Evaluation:      Plan of Care Reviewed With: patient    Overall Patient Progress: no changeOverall Patient Progress: no change         PRIMARY DIAGNOSIS: \"GENERIC\" NURSING  OUTPATIENT/OBSERVATION GOALS TO BE MET BEFORE DISCHARGE:  ADLs back to baseline: No    Activity and level of assistance: not OOB    Pain status: Improved but still requiring IV narcotics.    Return to near baseline physical activity: No     Discharge Planner Nurse   Safe discharge environment identified: Yes  Barriers to discharge: Yes       Entered by: Karen Feliz RN 06/26/2025 5:08 AM     Please review provider order for any additional goals.   Nurse to notify provider when observation goals have been met and patient is ready for discharge.       "

## 2025-06-26 NOTE — PROGRESS NOTES
"Urology  Progress Note    NAEO  Intermittent pain, requiring IV pain medications   NPO pending possible intervention, denies nausea/emesis  Ambulating    Exam  BP 94/52 (BP Location: Left arm)   Pulse 74   Temp 98.3  F (36.8  C) (Oral)   Resp 16   SpO2 94%   No acute distress  Unlabored breathing  Abdomen soft,  non-tender, non-distended.     UOP 1x/--    Labs  Recent Labs   Lab Test 06/25/25  0511   WBC 11.6*   HGB 12.8*   CR 1.37*         AM labs pending    Assessment/Plan  69 year old male with PMH of urolithiasis s/p ESWL on 6/23/25 who presented with left flank pain found to have obstructing stone fragments in the distal left ureter. Admitted for pain control and observation. Will consider stent placement today if he does not improve with conservative management.     Neuro: tylenol, prn oxy, dilaudid   CV: HD appropriate  Pulm: incentive spirometry while awake  FEN/GI: NPO, wean mIVF as tolerate, MAXX, monitor  Endo: Relatively euglycemic  : Continue flomax   Heme/ID: HD stable, transfuse prn, watch for s/s of infection  Activity: Up as tolerated, Ambulate at least TID   Ppx: SCDs, ambulation   Dispo: Floor    Seen and examined with the chief resident. Will discuss with staff surgeon, Dr. Benjie Perry MD, PGY2  Urology Resident    Contacting the urology team: Please see [employer] and page on-call clinician with any questions or concerns regarding this patient. Note writer may be unavailable.     To access [employer] from intranet: under \"Applications\" --> \"Business Applications\" select \"[employer]\" and search \"Urology Adult & Pediatric/[facility].\" Please note that any question about a urology inpatient, [place] or [place], should go to job code [id number].      "

## 2025-06-26 NOTE — ANESTHESIA CARE TRANSFER NOTE
Patient: Adi Lundberg    Procedure: Procedure(s):  Combined cystoscopy, retrogrades, left stent placement  Laser holmium lithotripsy ureter(s), insert stent, combined       Diagnosis: Kidney stone [N20.0]  Diagnosis Additional Information: No value filed.    Anesthesia Type:   General     Note:    Oropharynx: oropharynx clear of all foreign objects and spontaneously breathing  Level of Consciousness: awake  Oxygen Supplementation: face mask  Level of Supplemental Oxygen (L/min / FiO2): 4  Independent Airway: airway patency satisfactory and stable  Dentition: dentition unchanged  Vital Signs Stable: post-procedure vital signs reviewed and stable  Report to RN Given: handoff report given  Patient transferred to: PACU    Handoff Report: Identifed the Patient, Identified the Reponsible Provider, Reviewed the pertinent medical history, Discussed the surgical course, Reviewed Intra-OP anesthesia mangement and issues during anesthesia, Set expectations for post-procedure period and Allowed opportunity for questions and acknowledgement of understanding  Vitals:  Vitals Value Taken Time   /82 06/26/25 15:25   Temp     Pulse 98 06/26/25 15:26   Resp 15 06/26/25 15:26   SpO2 100 % 06/26/25 15:26   Vitals shown include unfiled device data.    Electronically Signed By: RAFAT Akins CRNA  June 26, 2025  3:27 PM

## 2025-06-26 NOTE — PLAN OF CARE
"Goal Outcome Evaluation:      Plan of Care Reviewed With: patient    Overall Patient Progress: no changeOverall Patient Progress: no change         PRIMARY DIAGNOSIS: \"GENERIC\" NURSING  OUTPATIENT/OBSERVATION GOALS TO BE MET BEFORE DISCHARGE:  ADLs back to baseline: No    Activity and level of assistance: not OOB    Pain status: Improved but still requiring IV narcotics.    Return to near baseline physical activity: No     Discharge Planner Nurse   Safe discharge environment identified: Yes  Barriers to discharge: Yes       Entered by: Karen Feliz RN 06/26/2025 5:07 AM   Blood pressure 94/52, pulse 74, temperature 98.3  F (36.8  C), temperature source Oral, resp. rate 16, SpO2 94%.    Please review provider order for any additional goals.   Nurse to notify provider when observation goals have been met and patient is ready for discharge.       "

## 2025-06-26 NOTE — ANESTHESIA PREPROCEDURE EVALUATION
"Anesthesia Pre-Procedure Evaluation    Patient: Adi Lundberg   MRN: [id number] : 1955          Procedure : Procedure(s):  Combined cystoscopy, retrogrades, left stent placement  Laser holmium lithotripsy ureter(s), insert stent, combined         No past medical history on file.   No past surgical history on file.   No Known Allergies   Social History     Tobacco Use    Smoking status: Not on file    Smokeless tobacco: Not on file   Substance Use Topics    Alcohol use: Not on file      Wt Readings from Last 1 Encounters:   No data found for Wt        Anesthesia Evaluation            ROS/MED HX  ENT/Pulmonary:  - neg pulmonary ROS     Neurologic:  - neg neurologic ROS     Cardiovascular:       METS/Exercise Tolerance:     Hematologic:  - neg hematologic  ROS     Musculoskeletal:  - neg musculoskeletal ROS     GI/Hepatic:  - neg GI/hepatic ROS     Renal/Genitourinary:     (+)       Nephrolithiasis ,       Endo:  - neg endo ROS     Psychiatric/Substance Use:  - neg psychiatric ROS     Infectious Disease:  - neg infectious disease ROS     Malignancy:   (+) Malignancy, History of Skin.    Other:              Physical Exam  Airway  Mallampati: II  TM distance: >3 FB  Upper bite lip test: I  Mouth opening: >= 4 cm    Cardiovascular - normal exam  Rhythm: regular  Rate: normal rate     Dental   (+) Completely normal teeth      Pulmonary - normal exam      Neurological   Other Findings       OUTSIDE LABS:  CBC:   Lab Results   Component Value Date    WBC 11.6 (H) 2025    HGB 12.8 (L) 2025    HCT 40.2 2025     2025     BMP:   Lab Results   Component Value Date     2025    POTASSIUM 4.0 2025    CHLORIDE 105 2025    CO2 24 2025    BUN 31.6 (H) 2025    CR 1.37 (H) 2025     (H) 2025     COAGS: No results found for: \"PTT\", \"INR\", \"FIBR\"  POC: No results found for: \"BGM\", \"HCG\", \"HCGS\"  HEPATIC:   Lab Results   Component Value Date    " ALBUMIN 3.8 06/25/2025    PROTTOTAL 6.1 (L) 06/25/2025    ALT 37 06/25/2025    AST 39 06/25/2025    ALKPHOS 241 (H) 06/25/2025    BILITOTAL 0.6 06/25/2025     OTHER:   Lab Results   Component Value Date    [place] 9.1 06/25/2025    LIPASE 64 (H) 06/25/2025       Anesthesia Plan    ASA Status:  2      NPO Status: NPO Appropriate   Anesthesia Type: General.  Airway: oral.  Induction: intravenous.  Maintenance: Balanced.   Techniques and Equipment:       - Monitoring Plan: standard ASA monitoring, train of four monitoring, processed EEG monitor     Consents    Anesthesia Plan(s) and associated risks, benefits, and realistic alternatives discussed. Questions answered and patient/representative(s) expressed understanding.     - Discussed: anesthesiologist     - Discussed with:  Patient          Blood Consent:      - Discussed with: patient.     Postoperative Care    Pain management: non-narcotic analgesics, plan for postoperative opioid use, multimodal analgesia.     Comments:                   Romain Schwartz MD    I have reviewed the pertinent notes and labs in the chart from the past 30 days and (re)examined the patient.  Any updates or changes from those notes are reflected in this note.    Clinically Significant Risk Factors Present on Admission

## 2025-06-26 NOTE — PROVIDER NOTIFICATION
Notified provider regarding breakthrough. Provider OK with giving dilaudid dose close to previously administered oxy dose

## 2025-06-26 NOTE — PROGRESS NOTES
"Paged Dr. Higgins \"Adi Lundberg PACU 12. need transfer order. ready for transfer back to floor. Fausto [phone]\"  "

## 2025-06-26 NOTE — OP NOTE
PREOPERATIVE DIAGNOSIS: Left  kidney stone  POSTOPERATIVE DIAGNOSIS: Same  PROCEDURE: Extracorporeal shockwave lithotripsy of kidney stone  INDICATIONS: Marvin Fox is a 69 year old male who was found to have kidney stones.  He was evaluated and after discussion of options (observation, ureteroscopy with laser lithotripsy, and extracorporeal shockwave lithtripsy (ESWL). He decided on pursuing extracorporeal shockwave lithtripsy. The risks include but are not limited to heart attack, stroke, blood clot to the lungs, death, bleeding, injury to organs surrounding the kidney, injury to the kidney, or death.    SURGEON: Bhavin Hung MD   RESIDENT SURGEON: None  ESTIMATED BLOOD LOSS: Zero.   COMPLICATIONS: None.   DRAINS AND TUBES: None.   FINDINGS: Stone well fragmented  DETAILS OF PROCEDURE:   The patient was properly identified in preoperative area. He was brought to the operating room, placed in supine position. General anesthesia was induced. A timeout was taken. He  was positioned over the fluoroscopy unit of the shockwave table in supine position. We were able to see the stone well in the left upper pole. The extracorporeal shockwave unit was then brought in and we delivered 2500 shocks to the visualized stone.  We did recheck positioning of the lithotriptor fluoroscopically a number of times during the procedure.     FOLLOW-UP PLAN:  He will plan to follow-up in clinic with xray in 1 month  CC  Patient Care Team:  Wegener, Joel Daniel Irwin, MD as PCP - General (Family Medicine)  ABHAY Soto MD as MD (Dermatology)  Wegener, Joel Daniel Irwin, MD as Assigned PCP  Ana Paula Hernandez AuD as Audiologist (Audiology)  Ladonna Mcqueen MD as MD (Otolaryngology)  Julieta Govea MD as Assigned Dermatology Provider  Bhavin Hung MD as MD (Urology)  Bhavin Hung MD as Assigned Surgical Provider      Copy to patient  MARVIN FOX  [address]  [facility] [address]

## 2025-06-26 NOTE — PLAN OF CARE
Goal Outcome Evaluation:         S/P:Combined cystoscopy, retrogrades, left stent placement  Laser holmium lithotripsy ureter(s), insert stent, combined,    Neuro: A&Ox4   Cardiac:  Regular,            Respiratory: RA  GI/: wnl  Diet/appetite: Regular diet, good appetite  Activity:  Up ad soumya, Ind to stand by assist  Pain: Managed with available meds.  Skin: No changes, old yellowish, bruise on the back from previous surgery. Penile soreness and pain.     Plan: TBD       BP (!) 154/84   Pulse 88   Temp 98.1  F (36.7  C) (Oral)   Resp 18   SpO2 100%

## 2025-06-26 NOTE — OR NURSING
"\" Hi, PT McIndoo schedule for OR today.  He has questions about the procedure before he is willing to proceed to the OR.  thanks Flavio Morse\"     This message sent to Urology on call resident @ 1000.   "

## 2025-06-26 NOTE — PLAN OF CARE
Goal Outcome Evaluation:/66 (BP Location: Left arm, Patient Position: Semi-Renee's)   Pulse 74   Temp 98.7  F (37.1  C) (Oral)   Resp 18   SpO2 94%         Plan of Care Reviewed With: patient    Overall Patient Progress: no change    Shift:1900-0002  Pt is alert and orientedx4. VSS. C/O L flank pain 8/10;PRN Oxycodone and IV Dilaudid with partial  relief . Voided x1 . No BM this shift.   Report give to Victoria RN and pt transferred to Obs 1A via w/c.

## 2025-06-26 NOTE — PLAN OF CARE
0700 - 1500  VSS. Pt c/o pain - dilaudid given x1. Pt denied SOB/n/v. 50ml/hr NS ordered and infusing. Pt answered many questions from provider prior to going for surgery today. 100ml urine out today, no stones seen. No significant events, continue POC.

## 2025-06-27 ASSESSMENT — ACTIVITIES OF DAILY LIVING (ADL)
ADLS_ACUITY_SCORE: 29

## 2025-06-27 NOTE — OP NOTE
PREOPERATIVE DIAGNOSIS:  Left Nephrolithiasis   POSTOPERATIVE DIAGNOSIS:  Same    PROCEDURES:  Cystoscopy.   Left Flexible Ureteroscopy  Holmium laser lithotripsy  Left Retrograde pyelogram  Interpretation of Fluoroscopic Imaging  Placement of double J Left ureteral stent    SURGEON: Ethan Waldrop MD  RESIDENT: None  ANESTHESIA: General  EBL: 0cc  FLUIDS: See dictated anesthesia record for details  SPECIMEN:  Stones for analysis  Drains and Tubes:  6 x 26cm Double J Stent  FINDINGS:   Significant clot in the left collecting system limited visibility  Small stones in the upper pole and distal ureter lasered to small fragments less than 2mm  Retrograde pyelogram showed no hydronephrosis     INDICATIONS:   Adi Lundberg is a 69 year old male with a history of urolithiasis.  He is here for ureteroscopy with laser lithotripsy to treat this.  He is aware of the risks and benefits of this procedure and has given informed consent.    DESCRIPTION OF PROCEDURE:   After obtaining informed consent, the patient was brought to the operating room and placed in the supine position on the operating room table. All pressure points were adequately cushioned and pneumatic compression devices were applied to the patient's bilateral lower extremities. Appropriate preoperative antibiotics were administered. General endotracheal anesthesia was induced without difficulty. The patient was repositioned into the dorsal lithotomy position. The patient was then prepped and draped in the usual sterile fashion. A timeout was performed to confirm the correct patient, positioning, procedure, and laterality.   Procedure was initiated with insertion of a 22 F rigid cystoscope into the bladder. The urethra was normal without stricture. At this time a full cystoscopy was performed.  Urethra was open.  Prostate was open.  The bladder was of normal capacity, without tumors, stones, or diverticuli, and the bilateral ureteral orifices were in the  normal orthotopic position.  A sensor guidewire was passed up the left ureteral orifice and up the ureter under fluoroscopic guidance.  A 5fr catheter was placed over the wire and a retrograde pyelogram was done.  The results of this can be found in the FINDINGS section.  A wire was then replaced.  I attempted to place the semirigid ureteroscopy but had to pass a second wire through the scope to make it past the small ureteral orifice.  Upon entering the ureter a large amount of stone debris flushed out.  I passed the scope 5 more cm but could not pass it further.  I thus switched to the flexible ureteroscopy which was placed over the second wire.  Upon passing the scope, the ureteropelvic junction was open. Stone was localized in the upper pole and fragmented with laser lithotripsy.  These were small fragments and were dusted. The upper, mid and lower poles were examined systematically and noted to be free of other stones.  A pullback ureteroscopy was then performed and there were additional distal ureteral stones which were lasered.  A few very small fragments were sent for analysis.   A ureteral stent (as detailed in the Drains section above) was then placed over the sensor guidewire.  This was positioned nicely with a good coil in the  renal pelvis and the bladder using fluoroscopy.   The bladder was then drained and procedure terminated.   The patient was awakened from anesthesia, transferred to the PACU in stable condition.    PLAN:  Discharge to home  Plan for follow-up CT prior to stent remonal  Stent is to be removed in clinic.

## 2025-06-27 NOTE — DISCHARGE SUMMARY
[facility] UroDischarge Summary    Patient: Adi Cardenasoo    MRN: [id number]   : 1955         Date of Admission:  2025   Date of Discharge::  2025  Admitting Physician:  Ethan Waldrop MD  Discharge Physician:  Fern Bermudez NP               Admission Diagnoses:   Kidney stone [N20.0]  No past medical history on file.          Discharge Diagnosis:     Kidney stone [N20.0]  No past medical history on file.           Procedures:     Procedure(s): Cystoscopy.   Left Flexible Ureteroscopy  Holmium laser lithotripsy  Left Retrograde pyelogram  Interpretation of Fluoroscopic Imaging  Placement of double J Left ureteral stent    By Dr Waldrop 25            Medications Prior to Admission:     Medications Prior to Admission   Medication Sig Dispense Refill Last Dose/Taking    albuterol (PROAIR HFA/PROVENTIL HFA/VENTOLIN HFA) 108 (90 Base) MCG/ACT inhaler Inhale 1-2 puffs into the lungs every 4 hours as needed for shortness of breath.   More than a month    ALPRAZolam (XANAX) 0.25 MG tablet Take 0.5 mg by mouth as needed.   Past Week    atorvastatin (LIPITOR) 40 MG tablet Take 40 mg by mouth at bedtime.   2025 Bedtime    hydrOXYzine HCl (ATARAX) 25 MG tablet Take 25 mg by mouth 3 times daily as needed.   Past Week    ketoconazole (NIZORAL) 2 % external shampoo Apply topically twice a week.   Past Week    mupirocin (BACTROBAN) 2 % external ointment Apply topically 2-3 times weekly as needed.   Past Week    oxyCODONE (ROXICODONE) 5 MG tablet Take 5 mg by mouth every 6 hours as needed.   Past Week    PARoxetine (PAXIL) 40 MG tablet Take 40 mg by mouth at bedtime.   2025 Bedtime    tamsulosin (FLOMAX) 0.4 MG capsule Take 0.4 mg by mouth daily.   2025    triamcinolone (KENALOG) 0.025 % cream Apply topically as needed.   Past Week             Discharge Medications:     Current Discharge Medication List        CONTINUE these medications which have NOT CHANGED     Details   albuterol (PROAIR HFA/PROVENTIL HFA/VENTOLIN HFA) 108 (90 Base) MCG/ACT inhaler Inhale 1-2 puffs into the lungs every 4 hours as needed for shortness of breath.      ALPRAZolam (XANAX) 0.25 MG tablet Take 0.5 mg by mouth as needed.      atorvastatin (LIPITOR) 40 MG tablet Take 40 mg by mouth at bedtime.      hydrOXYzine HCl (ATARAX) 25 MG tablet Take 25 mg by mouth 3 times daily as needed.      ketoconazole (NIZORAL) 2 % external shampoo Apply topically twice a week.      mupirocin (BACTROBAN) 2 % external ointment Apply topically 2-3 times weekly as needed.      oxyCODONE (ROXICODONE) 5 MG tablet Take 5 mg by mouth every 6 hours as needed.      PARoxetine (PAXIL) 40 MG tablet Take 40 mg by mouth at bedtime.      tamsulosin (FLOMAX) 0.4 MG capsule Take 0.4 mg by mouth daily.      triamcinolone (KENALOG) 0.025 % cream Apply topically as needed.                   Consultations:   Consultation during this admission received:   UROLOGY IP CONSULT  CONSULT FOR INPATIENT VASCULAR ACCESS CARE          Brief History of Illness:   Reason for admission requiring a surgical or invasive procedure:   Kidney stone [N20.0]   The patient underwent the following procedure(s):   See above   There were no immediate complications during this procedure.    Please refer to the full operative summary for details.           Hospital Course:     Adi Lundberg is a 69 year old male with PMH of urolithiasis s/p ESWL on 6/23/25 who presented with left flank pain found to have obstructing stone fragments in the distal left ureter. Admitted for pain control and observation on 6/25/25.     On 6/26/25, creatinine elevated from 1.37 to 1.98 and pain was not well controlled. Patient had URS w/ laser and double J left stent placement.     The patient's hospital course was otherwise unremarkable.  Adi Lundberg recovered as anticipated and experienced no post-operative complications.      On POD 1 he was ambulating without assitance,  "tolerating the discharge diet, had pain controlled with PO medications to go home with, and was requiring no IV medications or fluids. He was discharged to home with appropriate contact information, follow-up and instructions as seen below in the discharge paperwork.         Discharge Labs:     No results found for: \"PSA\"  Recent Labs   Lab 06/27/25  0549 06/26/25  1039   WBC 9.8 9.5   HGB 11.8* 11.8*   * 140*       Recent Labs   Lab 06/27/25  0549 06/26/25  1039    138   POTASSIUM 5.0 4.0   CHLORIDE 105 102   CO2 25 25   BUN 33.6* 33.1*   CR 1.44* 1.98*   * 81   [place] 8.7* 8.8       Recent Labs   Lab 06/25/25  0725   COLOR Orange*   APPEARANCE Slightly Cloudy*   URINEGLC Negative   URINEBILI Negative   URINEKETONE Negative   SG 1.020   URINEPH 6.0   PROTEIN 10*   NITRITE Negative   LEUKEST Trace*   RBCU >182*   WBCU 9*     No results found for this or any previous visit.         Discharge Instructions and Follow-Up:    Diet:  - Regular  - Drink 2-3L of water per day (to keep urine light pink / yellow)    Discharge Activity:  - Some hematuria (blood in the urine) is normal when you have a stent.  Return to normal activity only as limited by hematuria that gets worse with increased activity.    - Do not drive until you can press the brake pedal quickly and fully without pain.   - Do not operate a motor vehicle while taking narcotic pain medications.     Medications:  1) PAIN: To reduce your Opiate use, multiple non-opiate medications can be taken together.  First take Tylenol (acetaminophen/APAP).  Then add ibuprofen (motrin).  Lastly, add muscle spasm medications, such as Robaxen (methocarbamol).  In addition to these, medications such as Neurontin (gabapentin) and Lidocaine patches can also be added to help with pain.  Some of these have been prescribed for you.  Always follow prescribing guidelines.  Do not take more than 3,200 - 4,000mg of Tylenol (acetaminophen/ APAP) from all sources in any 24 " hour period since this can cause liver damage.  Do not take more than 2000 - 2400mg of ibuprofen in any 24 hour period since this can cause kidney damage.     If you still have pain after using non-opiates, add Oxycodone, an Opiate medication that has been prescribed for pain.  Never drive, operate machinery or drink alcoholic beverages while you are taking Opiate pain medications. Opiates will cause sleepiness and constipation and can become addictive, therefore it is best to stop or reduce them as soon as you can.  Any left over Opiates should be disposed of with an Authorized  for unneeded medications.  Contact your [facility] s or [facility] s household trash and recycling service to learn about medication disposal options and guidelines for your area.  If you decide to store this medication at home it should be kept in a locked cabinet to prevent access to children or visitors.     - Take stool softeners such as Senna while you are using narcotics, but stop if you develop diarrhea.   - We have prescribed Flomax (tamsulosin) to help you with your stent symptoms. Rarely, Flomax can cause dizziness with standing.  Stop the medication and call the office if this occurs.   - Stents can sometimes cause bladder spasms (lower abdominal pain that feels like the need to urinate). Call the office if spasms become bothersome so additional medications can be prescribed to help with symptoms.     Stent Instructions:   - You have a left ureteral stent in place.  Stents are not intended to be permanent and must be removed or replaced, typically within 3-6 months.   - You can expect some flank discomfort on that side, possibly worse with urination or worse first thing in the morning when your bladder is full.  You may also experience the urge to void more frequently or burning in your urethra with urination.  This is normal when you have a stent.    - Continue to take Flomax (tamsulosin) as prescribed to reduce stent  discomfort.    - Drink 2-3L of water a day to keep your urine clear to light pink in color. Some blood in your urine can be expected but should clear with increased water consumption as instructed.   - Call for thickening red urine (like tomato juice), large clots or inability to void.   - Strain your urine and collect & save all stones.  Bring the stone pieces to your Urology appointment for analysis.    Urethral Catheter Instructions:  - You are going home with a Lind catheter which will remain in place until your follow-up appointment.   - Your nurse or  will provide written catheter care instructions for you to take home.  - Protect the catheter and treat it like an extension of your body - keep it secured to your leg and do not let it get caught, snagged, or tugged.  - Apply bacitracin or vaseline ointment 2-3 times daily to the tip of the penis/catheter insertion point to keep the area lubricated and more comfortable.   - You may shower with your catheter in place.  You are encouraged to wash the catheter tubing to keep it clean, and the urine drainage bag also can be gotten wet.     Follow-Up:   - Schedule an appointment to be seen by your primary care provider within 7-10 days of discharge for a postoperative checkup.   - Follow up with Dr. Waldrop as scheduled in 2-4 weeks  - Call or return sooner than your regularly scheduled visit if you develop any of the following:  fever, chills, uncontrolled pain, or uncontrolled nausea or vomiting.  It is normal to see blood in your urine, but seek medical attention if you are passing large clots, are unable to void, or if your urine will not lighten in color with increased fluid intake.      Phone numbers:   - Monday through Friday 8am to 4:30pm: Call [phone] with questions, requests for medication refills, or to schedule or confirm an appointment.  - Nights, weekends, or holidays: call the after hours emergency pager - [phone] and tell the  " \"I would like to page the Urology Resident on call.\" Typically, the on-call provider should return your call within 30 minutes.  Please page the on-call provider again if you haven't been contacted as expected.  Rarely, the on-call provider will be unable to promptly return a call due to a hospital emergency.  If you have paged twice and are still not contacted, ask the hospital  to page the \"urology CHIEF-RESIDENT on call\".   - Please note that due to prescribing laws, resident physicians are unable to prescribe narcotics after-hours. If you feel as though you will need a refill of a narcotic pain medication, you will need to call the clinic during business hours OR seek emergency care.  - For emergencies, call 911         Discharge Disposition:     Discharged to home      Attestation: I have reviewed today's vital signs, notes, medications, labs and imaging.    Fern Bermudez NP  Department of Urology      Please call Job Code:   [phone] to reach the Urology resident or PA on call - Weekdays  [phone] to reach the Urology resident or PA on call - Weeknights and weekends      June 27, 2025         "

## 2025-06-27 NOTE — PLAN OF CARE
"Goal Outcome Evaluation:      Plan of Care Reviewed With: patient    Overall Patient Progress: improvingOverall Patient Progress: improving    Outcome Evaluation: pain with urination has a slight improvement      VS & Pain: Blood pressure (!) 147/76, pulse 73, temperature 99.4  F (37.4  C), temperature source Oral, resp. rate 18, height 1.735 m (5' 8.31\"), weight 55.5 kg (122 lb 5.7 oz), SpO2 100%.  Pain: oxy Prn given round the clock with tylenol scheduled. One dose of IV dilaudid  Neuro: WDL  Respiratory: WDL  Cardiac: WDL  Peripheral neurovascular: WDL  GI/: Dark red urine, clots present. Intense pain present   Nutrition: regular diet  Skin: some bruises present. On L arm scrape biopsy wound present. Mepilex on sacrum  Musculoskeletal: generalized weakness  Lines: PIV replaced, infusing 50 mls/hr  Activity: Assist x 1  Labs/Electrolytes: reviewed  Events this shift: Tremors and weakness present- provider paged.      "

## 2025-06-27 NOTE — PROGRESS NOTES
Pt discharged this shift, see note below. No acute events occurred w this RN this shift from 0700 to time of discharge    Discharge to: home  Transportation: friend  Time: approx 1100  Prescriptions: sent to discharge pharmacy, pt given instructions on how to pick them up, pt confirmed understanding.  Belongings: Packed by pt and floor staff, pt confirmed no belongings were missing   -if applicable return home meds from inpatient pharmacy: N/A  Access: x1 PIV removed  Care plan and education discontinued: yes  Paperwork: AVS provided to pt, and education on contents completed w pt. Pt asked appropriate questions, engaged w teaching, and confirmed understanding of AVS contents upon conclusion of education. Pt took AVS w him upon discharge.

## 2025-06-27 NOTE — PROGRESS NOTES
"Urology  Progress Note    24 hour events/Subjective:     - No acute events overnight   - Required IV dilaudid 0.2 mg x 1 overnight    - Pain well controlled on current regimen   - Tolerating regular diet ; no nausea or vomiting    Exam  BP (!) 147/76 (BP Location: Right arm)   Pulse 73   Temp 99.4  F (37.4  C) (Oral)   Resp 18   Ht 1.735 m (5' 8.31\")   Wt 55.5 kg (122 lb 5.7 oz)   SpO2 100%   BMI 18.44 kg/m    No acute distress  Unlabored breathing on RA  Abdomen non distended     Intake/Output Summary (Last 24 hours) at 6/27/2025 0507  Last data filed at 6/27/2025 0325  Gross per 24 hour   Intake 800 ml   Output 885 ml   Net -85 ml       /NR     Labs    AM labs pending     Assessment/Plan  69 year old male with PMH of urolithiasis s/p ESWL on 6/23/25 who presented with left flank pain found to have obstructing stone fragments in the distal left ureter. He is now POD#1 s/p URS, HLL of residual stone fragments and stent placement.     Medically ready for discharge once pain controlled with oral medications.     Note - plan changes for today are in bold below.    Neuro: tylenol, prn oxy, dilaudid   CV: HD appropriate  Pulm: incentive spirometry while awake  FEN/GI: Regular diet, wean mIVF as tolerate, MAXX, monitor  PTA famotidine   Endo: Relatively euglycemic  : Continue Flomax, Detrol 2 mg daily, stent to remain with outpatient CT prior to removal in clinic  Heme/ID: HD stable, transfuse prn, watch for s/s of infection  Activity: Up as tolerated, Ambulate at least TID   Ppx: SCDs, ambulation   Dispo: Discharge home today      Seen and examined with the chief resident. Will discuss with staff surgeon, Dr. Benjie Perry MD  Urology Resident PGY-2        Contacting the urology team: Please see [employer] and page on-call clinician with any questions or concerns regarding this patient. Note writer may be unavailable.     To access [employer] from intranet: under \"Applications\" --> \"Business " "Applications\" select \"[employer]\" and search \"Urology Adult & Pediatric/[facility].\" Please note that any question about a urology inpatient, [place], should go to job code [id number].     "

## 2025-07-01 ENCOUNTER — TELEPHONE (OUTPATIENT)
Dept: NURSING | Facility: CLINIC | Age: 70
End: 2025-07-01
Payer: COMMERCIAL

## 2025-07-02 NOTE — TELEPHONE ENCOUNTER
"Pt calling with questions about what to expect with recovery from lithotripsy/left ureter stent.  States he is feeling a little better daily and today was his best day so far.  Pt has a screen for urine and has seen a couple dark looking 2 - 3 mm fragments of \"what might look like mucous that might come out of your nose if you had a cold\"    Pt denies any symptoms of pain, bleeding, fever, unable to urinate at time of this call.    Reviewed urology clinic phone information with Pt and advised that he call clinic when open in the morning.    Letty Adam RN, Nurse Advisor 10:05 PM 7/1/2025                    "

## 2025-07-06 ENCOUNTER — HEALTH MAINTENANCE LETTER (OUTPATIENT)
Age: 70
End: 2025-07-06

## 2025-07-07 ENCOUNTER — MYC MEDICAL ADVICE (OUTPATIENT)
Dept: UROLOGY | Facility: CLINIC | Age: 70
End: 2025-07-07
Payer: COMMERCIAL

## 2025-07-07 DIAGNOSIS — N20.0 KIDNEY STONE: ICD-10-CM

## 2025-07-09 RX ORDER — TOLTERODINE TARTRATE 1 MG/1
1 TABLET, EXTENDED RELEASE ORAL 2 TIMES DAILY
Qty: 32 TABLET | Refills: 0 | Status: SHIPPED | OUTPATIENT
Start: 2025-07-09 | End: 2025-07-25

## 2025-07-09 NOTE — PROGRESS NOTES
Discussed with Renetta Sherman PA-C since Dr. Hung is out. Verbal order given to refill Detrol 1mg BID until pt's upcoming appt on 7/25.

## 2025-07-15 DIAGNOSIS — N20.0 KIDNEY STONE: Primary | ICD-10-CM

## 2025-07-15 RX ORDER — CYCLOBENZAPRINE HCL 5 MG
5 TABLET ORAL 3 TIMES DAILY PRN
Qty: 45 TABLET | Refills: 0 | Status: SHIPPED | OUTPATIENT
Start: 2025-07-15

## 2025-07-23 ENCOUNTER — ANCILLARY PROCEDURE (OUTPATIENT)
Dept: GENERAL RADIOLOGY | Facility: CLINIC | Age: 70
End: 2025-07-23
Attending: UROLOGY
Payer: COMMERCIAL

## 2025-07-23 DIAGNOSIS — N20.0 NEPHROLITHIASIS: ICD-10-CM

## 2025-07-23 PROCEDURE — 74018 RADEX ABDOMEN 1 VIEW: CPT | Mod: TC | Performed by: RADIOLOGY

## 2025-07-25 PROCEDURE — 87086 URINE CULTURE/COLONY COUNT: CPT | Performed by: UROLOGY

## 2025-07-25 PROCEDURE — 99000 SPECIMEN HANDLING OFFICE-LAB: CPT | Performed by: PATHOLOGY

## 2025-07-25 NOTE — H&P (VIEW-ONLY)
[facility]OLOGY [facility]IN[facility] [place].  Phone: [phone]   Fax: [phone]  CINDY Hung MD  Visit Date: Jul 25, 2025  Patient:  Marvin Elaine  Date of birth 1955, 69 year old male       ASSESSMENT and PLAN     Kidney stones  1/4/23 CT shows 4mm right uvj stone, left kidney stone 10mm left lower pole stone  4/9/25 renal ultrasound shows 10mm left kidney stone.  6/23/25 Left extracorporeal shockwave lithtripsy (ESWL).    6/26/2025 left ureteroscopy showed significant clot in the collecting system which limited visibility.  A few small stones were lasered but it was difficult to determine if stones were fully treated.    Pelvic pain syndrome/prostatitis  11/4/13 Saw Deion Gates 2024 tried cipro and biofeedback  4/17/24 Saw Dr Lance. Observation planned    Overall, he appears to have persistent kidney stones in his left distal ureter.  We talked about the option of observation versus removing the stent today versus ureteroscopy next week.  He would like to have the stones out and get the stent out as quickly as possible.  Will plan for ureteroscopy next week.  I will plan for the hospital given that they see back scope may be necessary.  I will repeat a urinalysis today, order Cipro for the next few days, and renew his oxycodone prescription.    Plan  Left ureteroscopy next week   ____________________________________________________________________    HPI  He is here today for follow-up after his recent ureteroscopy.    7/23/25 KUB  I reviewed the radiologic images and report from this radiologic exam.  My independent interpretation is:  Persistent left lureteral stones.        Most  Recent Labs   Lab Test 06/27/25  0549 06/26/25  1039 06/25/25  0511 06/17/25  1248   WBC 9.8 9.5 11.6* 5.2   HGB 11.8* 11.8* 12.8* 14.0   * 140* 164 232     Recent Labs   Lab Test 06/27/25  0549 06/26/25  1039 06/25/25  0511 06/17/25  1248 12/30/22  1018 12/21/18  1610 10/13/17  1245   CR 1.44* 1.98*  1.37* 1.04   < > 1.15 1.35*   GFRESTIMATED 52* 35* 55* 77   < > 66 53*   GFRESTBLACK  --   --   --   --   --  77 64   * 81 104* 94   < > 81 103*    < > = values in this interval not displayed.       CC  Patient Care Team:  Wegener, Joel Daniel Irwin, MD as PCP - General (Family Medicine)  ABHAY Soto MD as MD (Dermatology)  Wegener, Joel Daniel Irwin, MD as Assigned PCP  Ana Paula Hernandez AuD as Audiologist (Audiology)  Ladonna Mcqueen MD as MD (Otolaryngology)  Julieta Govea MD as Assigned Dermatology Provider  Bhavin Hung MD as MD (Urology)  Bhavin Hung MD as Assigned Surgical Provider  Wegener, Joel Daniel Irwin, MD (Family Medicine)  WEGENER, JOEL DANIEL IRWIN    Copy to patient  [facility]  [address]  [facility] [address]

## 2025-07-28 ENCOUNTER — ANESTHESIA EVENT (OUTPATIENT)
Dept: SURGERY | Facility: CLINIC | Age: 70
End: 2025-07-28
Payer: COMMERCIAL

## 2025-07-28 NOTE — ANESTHESIA PREPROCEDURE EVALUATION
Anesthesia Pre-Procedure Evaluation    Patient: Marvin Elaine   MRN: [id number] : 1955          Procedure : Procedure(s):  CYSTOURETEROSCOPY, WITH RETROGRADE PYELOGRAM, HOLMIUM LASER LITHOTRIPSY OF URETERAL CALCULUS, AND STENT INSERTION - LEFT         Past Medical History:   Diagnosis Date     Anxiety      Depressive disorder      Prostate infection       Past Surgical History:   Procedure Laterality Date     COLONOSCOPY       COLONOSCOPY N/A 3/7/2023    Procedure: COLONOSCOPY;  Surgeon: Bozena Franco MD;  Location: [facility] OR     COMBINED CYSTOSCOPY, RETROGRADES, URETEROSCOPY, LASER HOLMIUM LITHOTRIPSY URETER(S), INSERT STENT Left 2025    Procedure: Cystoureteroscopy, with Retrograde Pyelogram, Holmium Laser Lithotripsy, Left Ureteral Stent placement;  Surgeon: Bhavin Hung MD;  Location: [facility] OR     EXTRACORPOREAL SHOCK WAVE LITHOTRIPSY (ESWL) Left 2025    Procedure: LITHOTRIPSY, EXTRACORPOREAL SHOCK WAVE (ESWL);  Surgeon: Bhavin Hung MD;  Location: [facility] OR     EYE SURGERY       LAPAROSCOPIC APPENDECTOMY  2012    Procedure:LAPAROSCOPIC APPENDECTOMY; Open Appendectomy; Surgeon:GERDA GASTON; Location:[facility] OR      No Known Allergies   Social History     Tobacco Use     Smoking status: Never     Smokeless tobacco: Never   Substance Use Topics     Alcohol use: No      Wt Readings from Last 1 Encounters:   25 54.4 kg (120 lb)        Anesthesia Evaluation   Pt has had prior anesthetic. Type: General.    No history of anesthetic complications       ROS/MED HX  ENT/Pulmonary:  - neg pulmonary ROS     Neurologic:  - neg neurologic ROS     Cardiovascular:  - neg cardiovascular ROS     METS/Exercise Tolerance: >4 METS    Hematologic:  - neg hematologic  ROS     Musculoskeletal:       GI/Hepatic:  - neg GI/hepatic ROS     Renal/Genitourinary:     (+)       Nephrolithiasis ,       Endo:  - neg endo ROS     Psychiatric/Substance Use:     (+)  "psychiatric history anxiety       Infectious Disease:  - neg infectious disease ROS     Malignancy:   (+) Malignancy, History of Skin.    Other:      (+)  , H/O Chronic Pain,           Physical Exam  Airway  Mallampati: I  TM distance: >3 FB  Neck ROM: full  Upper bite lip test: I  Mouth opening: >= 4 cm    Cardiovascular   Rhythm: regular  Rate: normal rate     Dental   (+) Minor Abnormalities - some fillings, tiny chips        Pulmonary Breath sounds clear to auscultation        Neurological - normal exam  He appears awake, alert and oriented x3.    Other Findings       OUTSIDE LABS:  CBC:   Lab Results   Component Value Date    WBC 9.8 06/27/2025    WBC 9.5 06/26/2025    HGB 11.8 (L) 06/27/2025    HGB 11.8 (L) 06/26/2025    HCT 36.3 (L) 06/27/2025    HCT 35.8 (L) 06/26/2025     (L) 06/27/2025     (L) 06/26/2025     BMP:   Lab Results   Component Value Date     06/27/2025     06/26/2025    POTASSIUM 5.0 06/27/2025    POTASSIUM 4.0 06/26/2025    CHLORIDE 105 06/27/2025    CHLORIDE 102 06/26/2025    CO2 25 06/27/2025    CO2 25 06/26/2025    BUN 33.6 (H) 06/27/2025    BUN 33.1 (H) 06/26/2025    CR 1.44 (H) 06/27/2025    CR 1.98 (H) 06/26/2025     (H) 06/27/2025    GLC 81 06/26/2025     COAGS: No results found for: \"PTT\", \"INR\", \"FIBR\"  POC: No results found for: \"BGM\", \"HCG\", \"HCGS\"  HEPATIC:   Lab Results   Component Value Date    ALBUMIN 3.8 06/25/2025    PROTTOTAL 6.1 (L) 06/25/2025    ALT 37 06/25/2025    AST 39 06/25/2025     (H) 06/17/2025    ALKPHOS 241 (H) 06/25/2025    BILITOTAL 0.6 06/25/2025     OTHER:   Lab Results   Component Value Date    CHUCK 8.7 (L) 06/27/2025    MAG 2.2 03/04/2025    LIPASE 64 (H) 06/25/2025    TSH 1.24 03/04/2025       Anesthesia Plan    ASA Status:  2      NPO Status: NPO Appropriate   Anesthesia Type: General.  Airway: oral.  Induction: intravenous.  Maintenance: Balanced.   Techniques and Equipment:       - Monitoring Plan: standard ASA " monitoring, train of four monitoring, processed EEG monitor     Consents    Anesthesia Plan(s) and associated risks, benefits, and realistic alternatives discussed. Questions answered and patient/representative(s) expressed understanding.     - Discussed: anesthesiologist     - Discussed with:  Patient        - Pt is DNR/DNI Status: no DNR     - Suspend during perioperative period? No   Blood Consent:      - Discussed with: patient.     - Consented: consented to blood products     Postoperative Care    Pain management: non-narcotic analgesics, plan for postoperative opioid use, multimodal analgesia.     Comments:                 Gisel Agudelo MD    I have reviewed the pertinent notes and labs in the chart from the past 30 days and (re)examined the patient.  Any updates or changes from those notes are reflected in this note.    Clinically Significant Risk Factors Present on Admission

## 2025-07-29 ENCOUNTER — ANESTHESIA (OUTPATIENT)
Dept: SURGERY | Facility: CLINIC | Age: 70
End: 2025-07-29
Payer: COMMERCIAL

## 2025-07-29 ENCOUNTER — APPOINTMENT (OUTPATIENT)
Dept: GENERAL RADIOLOGY | Facility: CLINIC | Age: 70
End: 2025-07-29
Attending: UROLOGY
Payer: COMMERCIAL

## 2025-07-29 ENCOUNTER — HOSPITAL ENCOUNTER (OUTPATIENT)
Facility: CLINIC | Age: 70
Discharge: HOME | End: 2025-07-29
Attending: UROLOGY | Admitting: UROLOGY
Payer: COMMERCIAL

## 2025-07-29 ENCOUNTER — TELEPHONE (OUTPATIENT)
Dept: UROLOGY | Facility: CLINIC | Age: 70
End: 2025-07-29

## 2025-07-29 VITALS
HEART RATE: 82 BPM | RESPIRATION RATE: 16 BRPM | WEIGHT: 119.93 LBS | BODY MASS INDEX: 17.76 KG/M2 | TEMPERATURE: 98 F | DIASTOLIC BLOOD PRESSURE: 86 MMHG | SYSTOLIC BLOOD PRESSURE: 158 MMHG | OXYGEN SATURATION: 99 % | HEIGHT: 69 IN

## 2025-07-29 DIAGNOSIS — N20.0 KIDNEY STONE: Primary | ICD-10-CM

## 2025-07-29 DIAGNOSIS — N20.0 NEPHROLITHIASIS: Primary | ICD-10-CM

## 2025-07-29 PROCEDURE — 52352 CYSTOURETERO W/STONE REMOVE: CPT | Mod: 58 | Performed by: UROLOGY

## 2025-07-29 PROCEDURE — 255N000002 HC RX 255 OP 636: Performed by: UROLOGY

## 2025-07-29 PROCEDURE — C1758 CATHETER, URETERAL: HCPCS | Performed by: UROLOGY

## 2025-07-29 PROCEDURE — C1769 GUIDE WIRE: HCPCS | Performed by: UROLOGY

## 2025-07-29 PROCEDURE — 250N000009 HC RX 250: Performed by: STUDENT IN AN ORGANIZED HEALTH CARE EDUCATION/TRAINING PROGRAM

## 2025-07-29 PROCEDURE — 250N000011 HC RX IP 250 OP 636: Performed by: UROLOGY

## 2025-07-29 PROCEDURE — 250N000011 HC RX IP 250 OP 636: Performed by: STUDENT IN AN ORGANIZED HEALTH CARE EDUCATION/TRAINING PROGRAM

## 2025-07-29 PROCEDURE — 74420 UROGRAPHY RTRGR +-KUB: CPT | Mod: 26 | Performed by: UROLOGY

## 2025-07-29 PROCEDURE — 370N000017 HC ANESTHESIA TECHNICAL FEE, PER MIN: Performed by: UROLOGY

## 2025-07-29 PROCEDURE — 250N000011 HC RX IP 250 OP 636: Performed by: NURSE ANESTHETIST, CERTIFIED REGISTERED

## 2025-07-29 PROCEDURE — 258N000003 HC RX IP 258 OP 636: Performed by: NURSE ANESTHETIST, CERTIFIED REGISTERED

## 2025-07-29 PROCEDURE — 250N000009 HC RX 250: Performed by: NURSE ANESTHETIST, CERTIFIED REGISTERED

## 2025-07-29 PROCEDURE — 250N000013 HC RX MED GY IP 250 OP 250 PS 637: Performed by: STUDENT IN AN ORGANIZED HEALTH CARE EDUCATION/TRAINING PROGRAM

## 2025-07-29 PROCEDURE — 710N000010 HC RECOVERY PHASE 1, LEVEL 2, PER MIN: Performed by: UROLOGY

## 2025-07-29 PROCEDURE — 250N000025 HC SEVOFLURANE, PER MIN: Performed by: UROLOGY

## 2025-07-29 PROCEDURE — 710N000012 HC RECOVERY PHASE 2, PER MINUTE: Performed by: UROLOGY

## 2025-07-29 PROCEDURE — 272N000001 HC OR GENERAL SUPPLY STERILE: Performed by: UROLOGY

## 2025-07-29 PROCEDURE — 999N000179 XR SURGERY CARM FLUORO LESS THAN 5 MIN W STILLS

## 2025-07-29 PROCEDURE — C2617 STENT, NON-COR, TEM W/O DEL: HCPCS | Performed by: UROLOGY

## 2025-07-29 PROCEDURE — 999N000141 HC STATISTIC PRE-PROCEDURE NURSING ASSESSMENT: Performed by: UROLOGY

## 2025-07-29 PROCEDURE — 360N000083 HC SURGERY LEVEL 3 W/ FLUORO, PER MIN: Performed by: UROLOGY

## 2025-07-29 DEVICE — URETERAL STENT
Type: IMPLANTABLE DEVICE | Site: URETER | Status: FUNCTIONAL
Brand: PERCUFLEX™ PLUS

## 2025-07-29 RX ORDER — CEFAZOLIN SODIUM/WATER 2 G/20 ML
2 SYRINGE (ML) INTRAVENOUS
Status: COMPLETED | OUTPATIENT
Start: 2025-07-29 | End: 2025-07-29

## 2025-07-29 RX ORDER — OXYCODONE HYDROCHLORIDE 5 MG/1
5 TABLET ORAL
Status: DISCONTINUED | OUTPATIENT
Start: 2025-07-29 | End: 2025-07-29 | Stop reason: HOSPADM

## 2025-07-29 RX ORDER — DEXAMETHASONE SODIUM PHOSPHATE 4 MG/ML
4 INJECTION, SOLUTION INTRA-ARTICULAR; INTRALESIONAL; INTRAMUSCULAR; INTRAVENOUS; SOFT TISSUE
Status: DISCONTINUED | OUTPATIENT
Start: 2025-07-29 | End: 2025-07-29 | Stop reason: HOSPADM

## 2025-07-29 RX ORDER — ONDANSETRON 4 MG/1
4 TABLET, ORALLY DISINTEGRATING ORAL EVERY 30 MIN PRN
Status: DISCONTINUED | OUTPATIENT
Start: 2025-07-29 | End: 2025-07-29 | Stop reason: HOSPADM

## 2025-07-29 RX ORDER — NALOXONE HYDROCHLORIDE 0.4 MG/ML
0.1 INJECTION, SOLUTION INTRAMUSCULAR; INTRAVENOUS; SUBCUTANEOUS
Status: DISCONTINUED | OUTPATIENT
Start: 2025-07-29 | End: 2025-07-29 | Stop reason: HOSPADM

## 2025-07-29 RX ORDER — SODIUM CHLORIDE, SODIUM LACTATE, POTASSIUM CHLORIDE, CALCIUM CHLORIDE 600; 310; 30; 20 MG/100ML; MG/100ML; MG/100ML; MG/100ML
INJECTION, SOLUTION INTRAVENOUS CONTINUOUS
Status: DISCONTINUED | OUTPATIENT
Start: 2025-07-29 | End: 2025-07-29 | Stop reason: HOSPADM

## 2025-07-29 RX ORDER — HYDROMORPHONE HCL IN WATER/PF 6 MG/30 ML
0.2 PATIENT CONTROLLED ANALGESIA SYRINGE INTRAVENOUS EVERY 5 MIN PRN
Status: DISCONTINUED | OUTPATIENT
Start: 2025-07-29 | End: 2025-07-29 | Stop reason: HOSPADM

## 2025-07-29 RX ORDER — ONDANSETRON 2 MG/ML
4 INJECTION INTRAMUSCULAR; INTRAVENOUS EVERY 30 MIN PRN
Status: DISCONTINUED | OUTPATIENT
Start: 2025-07-29 | End: 2025-07-29 | Stop reason: HOSPADM

## 2025-07-29 RX ORDER — FENTANYL CITRATE 50 UG/ML
25 INJECTION, SOLUTION INTRAMUSCULAR; INTRAVENOUS EVERY 5 MIN PRN
Status: DISCONTINUED | OUTPATIENT
Start: 2025-07-29 | End: 2025-07-29 | Stop reason: HOSPADM

## 2025-07-29 RX ORDER — FENTANYL CITRATE 50 UG/ML
50 INJECTION, SOLUTION INTRAMUSCULAR; INTRAVENOUS EVERY 5 MIN PRN
Status: DISCONTINUED | OUTPATIENT
Start: 2025-07-29 | End: 2025-07-29 | Stop reason: HOSPADM

## 2025-07-29 RX ORDER — ACETAMINOPHEN 325 MG/1
975 TABLET ORAL ONCE
Status: COMPLETED | OUTPATIENT
Start: 2025-07-29 | End: 2025-07-29

## 2025-07-29 RX ORDER — LIDOCAINE 40 MG/G
CREAM TOPICAL
Status: DISCONTINUED | OUTPATIENT
Start: 2025-07-29 | End: 2025-07-29 | Stop reason: HOSPADM

## 2025-07-29 RX ORDER — IOPAMIDOL 510 MG/ML
INJECTION, SOLUTION INTRAVASCULAR PRN
Status: DISCONTINUED | OUTPATIENT
Start: 2025-07-29 | End: 2025-07-29 | Stop reason: HOSPADM

## 2025-07-29 RX ORDER — ACETAMINOPHEN 325 MG/1
975 TABLET ORAL ONCE
Status: DISCONTINUED | OUTPATIENT
Start: 2025-07-29 | End: 2025-07-29 | Stop reason: HOSPADM

## 2025-07-29 RX ORDER — HYDROMORPHONE HCL IN WATER/PF 6 MG/30 ML
0.4 PATIENT CONTROLLED ANALGESIA SYRINGE INTRAVENOUS EVERY 5 MIN PRN
Status: DISCONTINUED | OUTPATIENT
Start: 2025-07-29 | End: 2025-07-29 | Stop reason: HOSPADM

## 2025-07-29 RX ORDER — CEFAZOLIN SODIUM/WATER 2 G/20 ML
2 SYRINGE (ML) INTRAVENOUS SEE ADMIN INSTRUCTIONS
Status: DISCONTINUED | OUTPATIENT
Start: 2025-07-29 | End: 2025-07-29 | Stop reason: HOSPADM

## 2025-07-29 RX ORDER — FENTANYL CITRATE 50 UG/ML
INJECTION, SOLUTION INTRAMUSCULAR; INTRAVENOUS PRN
Status: DISCONTINUED | OUTPATIENT
Start: 2025-07-29 | End: 2025-07-29

## 2025-07-29 RX ORDER — OXYCODONE HYDROCHLORIDE 5 MG/1
5-10 TABLET ORAL EVERY 4 HOURS PRN
Qty: 6 TABLET | Refills: 0 | Status: SHIPPED | OUTPATIENT
Start: 2025-07-29

## 2025-07-29 RX ORDER — ACETAMINOPHEN 650 MG/1
650 SUPPOSITORY RECTAL ONCE
Status: DISCONTINUED | OUTPATIENT
Start: 2025-07-29 | End: 2025-07-29 | Stop reason: HOSPADM

## 2025-07-29 RX ORDER — OXYCODONE HYDROCHLORIDE 10 MG/1
10 TABLET ORAL
Status: DISCONTINUED | OUTPATIENT
Start: 2025-07-29 | End: 2025-07-29 | Stop reason: HOSPADM

## 2025-07-29 RX ORDER — LIDOCAINE HYDROCHLORIDE 20 MG/ML
INJECTION, SOLUTION INFILTRATION; PERINEURAL PRN
Status: DISCONTINUED | OUTPATIENT
Start: 2025-07-29 | End: 2025-07-29

## 2025-07-29 RX ORDER — PROPOFOL 10 MG/ML
INJECTION, EMULSION INTRAVENOUS PRN
Status: DISCONTINUED | OUTPATIENT
Start: 2025-07-29 | End: 2025-07-29

## 2025-07-29 RX ORDER — SODIUM CHLORIDE, SODIUM LACTATE, POTASSIUM CHLORIDE, CALCIUM CHLORIDE 600; 310; 30; 20 MG/100ML; MG/100ML; MG/100ML; MG/100ML
INJECTION, SOLUTION INTRAVENOUS CONTINUOUS PRN
Status: DISCONTINUED | OUTPATIENT
Start: 2025-07-29 | End: 2025-07-29

## 2025-07-29 RX ORDER — ONDANSETRON 2 MG/ML
INJECTION INTRAMUSCULAR; INTRAVENOUS PRN
Status: DISCONTINUED | OUTPATIENT
Start: 2025-07-29 | End: 2025-07-29

## 2025-07-29 RX ORDER — DEXAMETHASONE SODIUM PHOSPHATE 4 MG/ML
INJECTION, SOLUTION INTRA-ARTICULAR; INTRALESIONAL; INTRAMUSCULAR; INTRAVENOUS; SOFT TISSUE PRN
Status: DISCONTINUED | OUTPATIENT
Start: 2025-07-29 | End: 2025-07-29

## 2025-07-29 RX ADMIN — ACETAMINOPHEN 975 MG: 325 TABLET ORAL at 09:43

## 2025-07-29 RX ADMIN — PROPOFOL 20 MG: 10 INJECTION, EMULSION INTRAVENOUS at 11:35

## 2025-07-29 RX ADMIN — FENTANYL CITRATE 50 MCG: 50 INJECTION INTRAMUSCULAR; INTRAVENOUS at 11:28

## 2025-07-29 RX ADMIN — DEXAMETHASONE SODIUM PHOSPHATE 4 MG: 4 INJECTION, SOLUTION INTRAMUSCULAR; INTRAVENOUS at 11:32

## 2025-07-29 RX ADMIN — ONDANSETRON 4 MG: 2 INJECTION INTRAMUSCULAR; INTRAVENOUS at 12:24

## 2025-07-29 RX ADMIN — PROPOFOL 90 MG: 10 INJECTION, EMULSION INTRAVENOUS at 11:29

## 2025-07-29 RX ADMIN — LIDOCAINE HYDROCHLORIDE 80 MG: 20 INJECTION, SOLUTION INFILTRATION; PERINEURAL at 11:29

## 2025-07-29 RX ADMIN — Medication 30 MG: at 11:30

## 2025-07-29 RX ADMIN — Medication 100 MG: at 12:38

## 2025-07-29 RX ADMIN — SODIUM CHLORIDE, SODIUM LACTATE, POTASSIUM CHLORIDE, AND CALCIUM CHLORIDE: .6; .31; .03; .02 INJECTION, SOLUTION INTRAVENOUS at 11:20

## 2025-07-29 RX ADMIN — Medication 200 MG: at 12:26

## 2025-07-29 RX ADMIN — Medication 20 MG: at 11:42

## 2025-07-29 RX ADMIN — Medication 20 MG: at 11:48

## 2025-07-29 RX ADMIN — Medication 2 G: at 11:30

## 2025-07-29 RX ADMIN — Medication 20 MG: at 11:54

## 2025-07-29 RX ADMIN — FENTANYL CITRATE 50 MCG: 50 INJECTION INTRAMUSCULAR; INTRAVENOUS at 11:52

## 2025-07-29 ASSESSMENT — ACTIVITIES OF DAILY LIVING (ADL)
ADLS_ACUITY_SCORE: 52
ADLS_ACUITY_SCORE: 45
ADLS_ACUITY_SCORE: 45
ADLS_ACUITY_SCORE: 52

## 2025-07-29 NOTE — ANESTHESIA PROCEDURE NOTES
Airway         Procedure Start/Stop Times: 7/29/2025 11:34 AM  Staff -        Anesthesiologist:  Gisel Agudelo MD       CRNA: Murali Young APRN CRNA       Other Anesthesia Staff: Nydia Alston       Performed By: anesthesiologist, CRNA and SRNA  Consent for Airway        Urgency: elective  Indications and Patient Condition       Indications for airway management: mily-procedural       Induction type:intravenous       Mask difficulty assessment: 1 - vent by mask    Final Airway Details       Final airway type: endotracheal airway       Successful airway: ETT - single and Oral  Endotracheal Airway Details        ETT size (mm): 7.5       Cuffed: yes       Successful intubation technique: direct laryngoscopy       DL Blade Type: Lee 2       Grade View of Cords: 1       Adjucts: stylet       Position: Right       Measured from: lips       Secured at (cm): 23       Bite block used: None    Post intubation assessment        Placement verified by: capnometry, equal breath sounds and chest rise        Number of attempts at approach: 1       Number of other approaches attempted: 0       Secured with: tape       Ease of procedure: easy       Dentition: Intact and Unchanged    Medication(s) Administered   Medication Administration Time: 7/29/2025 11:34 AM

## 2025-07-29 NOTE — TELEPHONE ENCOUNTER
----- Message from Bhavin Hung sent at 7/29/2025  1:04 PM CDT -----  Garfield,  On his August 8 appointment, can you set up a renal ultrasound prior to the appointment.  He does not need a cystoscopy as discussed exam strength.  Thank you, Pino

## 2025-07-29 NOTE — DISCHARGE INSTRUCTIONS
Contacting your Doctor -   To contact a doctor, call Dr Bhavin Hung at [phone]--Urology Clinic  or:  [phone] and ask for the resident on call for Urology (answered 24 hours a day)   Emergency Department:  [facility]: [phone]  [facility]: [phone] 911 if you are in need of immediate or emergent help

## 2025-07-29 NOTE — OP NOTE
PREOPERATIVE DIAGNOSIS:  Left Nephrolithiasis   POSTOPERATIVE DIAGNOSIS:  Same    PROCEDURES:  Cystoscopy.   Left Flexible Ureteroscopy  Left Retrograde pyelogram  Interpretation of Fluoroscopic Imaging  Placement of double J Left ureteral stent    SURGEON: Bhavin Hung MD  RESIDENT: None  ANESTHESIA: General  EBL: 0cc  FLUIDS: See dictated anesthesia record for details  SPECIMEN:  Stones for analysis  Drains and Tubes:  6 x 26cm Double J Stent.  A string was left on the stent  FINDINGS:   Visually and radiographically stone free at the end of the case    INDICATIONS:   Marvin Elaine is a 69 year old male with a history of urolithiasis.  He is here for ureteroscopy with laser lithotripsy to treat this.  This is due to residual fragments seen on his recent abdominal x-ray.  He is aware of the risks and benefits of this procedure and has given informed consent.    DESCRIPTION OF PROCEDURE:   After obtaining informed consent, the patient was brought to the operating room and placed in the supine position on the operating room table. All pressure points were adequately cushioned and pneumatic compression devices were applied to the patient's bilateral lower extremities. Appropriate preoperative antibiotics were administered. General endotracheal anesthesia was induced without difficulty. The patient was repositioned into the dorsal lithotomy position. The patient was then prepped and draped in the usual sterile fashion. A timeout was performed to confirm the correct patient, positioning, procedure, and laterality.   Procedure was initiated with insertion of a 22 F rigid cystoscope into the bladder. The urethra was normal without stricture. At this time a full cystoscopy was performed.  Urethra was open.  Prostate was open.  The bladder was of normal capacity, without tumors or stones.  He did have moderate to severe diverticuli.  Bilateral ureteral orifices were in the normal orthotopic position.  Using  grasper, the distal curl of the double J stent was brought through the urethral meatus. Straight sensor guidewire was passed through the existing stent and advanced to the renal pelvis under fluoscopic guidance.   Ureteroscopy was then started with the flexible ureteroscope placed over the wire.  There were multiple stone fragments in the distal ureter that were available.  I then removed the flexible scope and placed a semirigid ureteroscope.  Stone basket was used to remove all the fragments from this area.  I would this far proximally as possible and then switched back to the flexible scope, placing this over the wire.  Full ureteroscopy including retrograde pyelogram was then undertaken in the kidney.  I used a retrograde pyelogram images to assure that I checked all the calyces.  There were no stones visible radiographically or ureteroscopically.  A pullback ureteroscopy was negative as well.  Guidewire was replaced.  A ureteral stent (as detailed in the Drains section above) was then placed over the sensor guidewire.  This was positioned nicely with a good coil in the  renal pelvis and the bladder using fluoroscopy.   The string was left on the stent.  The stent follows out early this should not be a problem as there was minimal trauma to the ureter.  The bladder was then drained and all bladder stone fragments were irrigated from the bladder.  The procedure was then terminated.   The patient was awakened from anesthesia, transferred to the PACU in stable condition.    PLAN:  Discharge to home  He does not need follow up imaging before stent removal.  Stent is to be removed at home by using the attached parenchyma.

## 2025-07-29 NOTE — ANESTHESIA CARE TRANSFER NOTE
Patient: Marvin Elaine    Procedure: Procedure(s):  CYSTOURETEROSCOPY, WITH RETROGRADE PYELOGRAM, HOLMIUM LASER STANDBY, AND STENT EXCHANGE - LEFT       Diagnosis: Nephrolithiasis [N20.0]  Diagnosis Additional Information: No value filed.    Anesthesia Type:   General     Note:    Oropharynx: oropharynx clear of all foreign objects and spontaneously breathing  Level of Consciousness: awake  Oxygen Supplementation: face mask  Level of Supplemental Oxygen (L/min / FiO2): 6  Independent Airway: airway patency satisfactory and stable  Dentition: dentition unchanged  Vital Signs Stable: post-procedure vital signs reviewed and stable  Report to RN Given: handoff report given  Patient transferred to: PACU    Handoff Report: Identifed the Patient, Identified the Reponsible Provider, Reviewed the pertinent medical history, Discussed the surgical course, Reviewed Intra-OP anesthesia mangement and issues during anesthesia, Set expectations for post-procedure period and Allowed opportunity for questions and acknowledgement of understanding      Vitals:  Vitals Value Taken Time   /82    Temp     Pulse 88 07/29/25 12:47   Resp     SpO2 100 % 07/29/25 12:47   Vitals shown include unfiled device data.    Electronically Signed By: CONCEPCION Lauren CRNA  July 29, 2025  12:48 PM

## 2025-07-30 NOTE — TELEPHONE ENCOUNTER
Imaging contacted to help pt schedule US prior to 8/8 appointment. Appt changed from cystoscopy to post-op follow-up.

## 2025-08-01 ENCOUNTER — ANCILLARY PROCEDURE (OUTPATIENT)
Dept: ULTRASOUND IMAGING | Facility: CLINIC | Age: 70
End: 2025-08-01
Attending: UROLOGY
Payer: COMMERCIAL

## 2025-08-01 DIAGNOSIS — N20.0 NEPHROLITHIASIS: ICD-10-CM

## 2025-08-01 PROCEDURE — 76770 US EXAM ABDO BACK WALL COMP: CPT | Mod: GC | Performed by: RADIOLOGY

## 2025-08-06 ENCOUNTER — PRE VISIT (OUTPATIENT)
Dept: UROLOGY | Facility: CLINIC | Age: 70
End: 2025-08-06
Payer: COMMERCIAL

## 2025-08-08 ENCOUNTER — OFFICE VISIT (OUTPATIENT)
Dept: UROLOGY | Facility: CLINIC | Age: 70
End: 2025-08-08
Payer: COMMERCIAL

## 2025-08-08 ENCOUNTER — MYC MEDICAL ADVICE (OUTPATIENT)
Dept: FAMILY MEDICINE | Facility: CLINIC | Age: 70
End: 2025-08-08
Payer: COMMERCIAL

## 2025-08-08 VITALS
HEIGHT: 69 IN | OXYGEN SATURATION: 100 % | DIASTOLIC BLOOD PRESSURE: 84 MMHG | BODY MASS INDEX: 17.97 KG/M2 | HEART RATE: 83 BPM | SYSTOLIC BLOOD PRESSURE: 132 MMHG

## 2025-08-08 DIAGNOSIS — R74.8 ELEVATED ALKALINE PHOSPHATASE LEVEL: Primary | ICD-10-CM

## 2025-08-08 DIAGNOSIS — N20.0 NEPHROLITHIASIS: Primary | ICD-10-CM

## 2025-08-08 PROCEDURE — 3075F SYST BP GE 130 - 139MM HG: CPT | Performed by: UROLOGY

## 2025-08-08 PROCEDURE — 99207 E-CONSULT TO HEPATOLOGY (ADULT OUTPT PROVIDER TO SPECIALIST WRITTEN QUESTION & RESPONSE): CPT | Performed by: FAMILY MEDICINE

## 2025-08-08 PROCEDURE — 99214 OFFICE O/P EST MOD 30 MIN: CPT | Performed by: UROLOGY

## 2025-08-08 PROCEDURE — 3079F DIAST BP 80-89 MM HG: CPT | Performed by: UROLOGY

## 2025-08-08 PROCEDURE — 1125F AMNT PAIN NOTED PAIN PRSNT: CPT | Performed by: UROLOGY

## 2025-08-08 ASSESSMENT — PAIN SCALES - GENERAL: PAINLEVEL_OUTOF10: MILD PAIN (1)

## 2025-08-11 ENCOUNTER — MYC MEDICAL ADVICE (OUTPATIENT)
Dept: UROLOGY | Facility: CLINIC | Age: 70
End: 2025-08-11
Payer: COMMERCIAL

## 2025-08-11 DIAGNOSIS — N20.0 NEPHROLITHIASIS: ICD-10-CM

## 2025-08-12 RX ORDER — TAMSULOSIN HYDROCHLORIDE 0.4 MG/1
0.4 CAPSULE ORAL DAILY
Qty: 30 CAPSULE | Refills: 0 | Status: SHIPPED | OUTPATIENT
Start: 2025-08-12

## 2025-08-21 ENCOUNTER — E-CONSULT (OUTPATIENT)
Dept: GASTROENTEROLOGY | Facility: CLINIC | Age: 70
End: 2025-08-21
Payer: COMMERCIAL

## 2025-08-26 ENCOUNTER — E-CONSULT (OUTPATIENT)
Dept: GASTROENTEROLOGY | Facility: CLINIC | Age: 70
End: 2025-08-26
Payer: COMMERCIAL

## 2025-08-26 DIAGNOSIS — R74.8 ELEVATED ALKALINE PHOSPHATASE LEVEL: Primary | ICD-10-CM

## 2025-08-26 PROCEDURE — 99451 NTRPROF PH1/NTRNET/EHR 5/>: CPT | Mod: 24 | Performed by: STUDENT IN AN ORGANIZED HEALTH CARE EDUCATION/TRAINING PROGRAM

## 2025-08-27 ENCOUNTER — PATIENT OUTREACH (OUTPATIENT)
Dept: CARE COORDINATION | Facility: CLINIC | Age: 70
End: 2025-08-27
Payer: COMMERCIAL

## 2025-08-28 ENCOUNTER — PRE VISIT (OUTPATIENT)
Dept: UROLOGY | Facility: CLINIC | Age: 70
End: 2025-08-28
Payer: COMMERCIAL

## 2025-09-03 ENCOUNTER — TELEPHONE (OUTPATIENT)
Dept: AUDIOLOGY | Facility: CLINIC | Age: 70
End: 2025-09-03
Payer: COMMERCIAL

## (undated) DEVICE — LINEN TOWEL PACK X5 5464

## (undated) DEVICE — TUBING SUCTION 12"X1/4" N612

## (undated) DEVICE — DRAPE C-ARM W/STRAPS 42X72" 07-CA104

## (undated) DEVICE — GUIDEWIRE SENSOR DUAL FLEX STR 0.035"X150CM M0066703080

## (undated) DEVICE — SUCTION MANIFOLD NEPTUNE 2 SYS 4 PORT 0702-020-000

## (undated) DEVICE — SPECIMEN CONTAINER 3OZ W/FORMALIN 59901

## (undated) DEVICE — SNARE CAPIVATOR ROUND COLD SNR BX10 M00561101

## (undated) DEVICE — ADH LIQUID MASTISOL TOPICAL VIAL 2-3ML 0523-48

## (undated) DEVICE — BASKET STONE RETRIEVAL NTNL ZERO TIP 1.9FRX90CM M0063901050

## (undated) DEVICE — DRAPE STERI TOWEL LG 1010

## (undated) DEVICE — CATH URETERAL OPEN END 05FR 70CM 020015

## (undated) DEVICE — PACK GOWN 3/PK DISP XL SBA32GPFCB

## (undated) DEVICE — Device

## (undated) DEVICE — SOL NACL 0.9% IRRIG 3000ML BAG 2B7127

## (undated) DEVICE — SUCTION MANIFOLD NEPTUNE 2 SYS 1 PORT 702-025-000

## (undated) DEVICE — GOWN IMPERVIOUS 2XL BLUE

## (undated) DEVICE — KIT ENDO TURNOVER/PROCEDURE CARRY-ON 101822

## (undated) DEVICE — TUBING SET THERMEDX UROLOGY SGL USE LL0006

## (undated) DEVICE — SOL WATER IRRIG 500ML BOTTLE 2F7113

## (undated) DEVICE — PAD CHUX UNDERPAD 23X36" 676105

## (undated) DEVICE — ENDO SEAL BX PORT BPS-A

## (undated) DEVICE — SOL WATER IRRIG 1000ML BOTTLE 2F7114

## (undated) DEVICE — PACK CYSTO UMMC CUSTOM

## (undated) RX ORDER — LIDOCAINE HYDROCHLORIDE 10 MG/ML
INJECTION, SOLUTION EPIDURAL; INFILTRATION; INTRACAUDAL; PERINEURAL
Status: DISPENSED
Start: 2025-06-23

## (undated) RX ORDER — PROPOFOL 10 MG/ML
INJECTION, EMULSION INTRAVENOUS
Status: DISPENSED
Start: 2025-07-29

## (undated) RX ORDER — FENTANYL CITRATE 50 UG/ML
INJECTION, SOLUTION INTRAMUSCULAR; INTRAVENOUS
Status: DISPENSED
Start: 2025-06-23

## (undated) RX ORDER — ONDANSETRON 2 MG/ML
INJECTION INTRAMUSCULAR; INTRAVENOUS
Status: DISPENSED
Start: 2025-07-29

## (undated) RX ORDER — ONDANSETRON 2 MG/ML
INJECTION INTRAMUSCULAR; INTRAVENOUS
Status: DISPENSED
Start: 2025-06-23

## (undated) RX ORDER — LABETALOL HYDROCHLORIDE 5 MG/ML
INJECTION, SOLUTION INTRAVENOUS
Status: DISPENSED
Start: 2025-06-23

## (undated) RX ORDER — CEFAZOLIN SODIUM/WATER 2 G/20 ML
SYRINGE (ML) INTRAVENOUS
Status: DISPENSED
Start: 2025-07-29

## (undated) RX ORDER — DEXAMETHASONE SODIUM PHOSPHATE 4 MG/ML
INJECTION, SOLUTION INTRA-ARTICULAR; INTRALESIONAL; INTRAMUSCULAR; INTRAVENOUS; SOFT TISSUE
Status: DISPENSED
Start: 2025-06-23

## (undated) RX ORDER — PROPOFOL 10 MG/ML
INJECTION, EMULSION INTRAVENOUS
Status: DISPENSED
Start: 2025-06-23

## (undated) RX ORDER — ACETAMINOPHEN 325 MG/1
TABLET ORAL
Status: DISPENSED
Start: 2025-06-23

## (undated) RX ORDER — IOPAMIDOL 510 MG/ML
INJECTION, SOLUTION INTRAVASCULAR
Status: DISPENSED
Start: 2025-07-29

## (undated) RX ORDER — FENTANYL CITRATE 50 UG/ML
INJECTION, SOLUTION INTRAMUSCULAR; INTRAVENOUS
Status: DISPENSED
Start: 2025-07-29

## (undated) RX ORDER — ACETAMINOPHEN 325 MG/1
TABLET ORAL
Status: DISPENSED
Start: 2025-07-29